# Patient Record
Sex: MALE | URBAN - METROPOLITAN AREA
[De-identification: names, ages, dates, MRNs, and addresses within clinical notes are randomized per-mention and may not be internally consistent; named-entity substitution may affect disease eponyms.]

---

## 2024-10-14 ENCOUNTER — TELEPHONE (OUTPATIENT)
Dept: CARDIOTHORACIC SURGERY | Facility: CLINIC | Age: 79
End: 2024-10-14
Payer: OTHER GOVERNMENT

## 2024-10-14 NOTE — TELEPHONE ENCOUNTER
Contact pt's cardiologist's office to request office notes and diagnostic testing reports. Will schedule consultation appointment upon receipt of records.       ----- Message from Kasey sent at 10/14/2024  2:27 PM CDT -----  Regarding: referral  Good Evening    Kaylen FAUSTIN would like to refer the following patient  in the  Cardiothoracic Surgery department. The patients diagnosis is  Nonrheumatic mitral (valve) insufficiency. I have scanned the patients referral and records into .       Thank you,   Kasey Kerns

## 2024-10-21 ENCOUNTER — TELEPHONE (OUTPATIENT)
Dept: CARDIOTHORACIC SURGERY | Facility: CLINIC | Age: 79
End: 2024-10-21
Payer: OTHER GOVERNMENT

## 2024-10-21 NOTE — TELEPHONE ENCOUNTER
Spoke with pt and confirmed 10/22 appt with Dr. Gates. Pt verbalized understanding of appt time and location. Pt denies any further questions at this time

## 2024-10-22 ENCOUNTER — OFFICE VISIT (OUTPATIENT)
Dept: CARDIOTHORACIC SURGERY | Facility: CLINIC | Age: 79
End: 2024-10-22
Payer: OTHER GOVERNMENT

## 2024-10-22 VITALS
OXYGEN SATURATION: 99 % | WEIGHT: 170.94 LBS | SYSTOLIC BLOOD PRESSURE: 142 MMHG | DIASTOLIC BLOOD PRESSURE: 77 MMHG | BODY MASS INDEX: 26.83 KG/M2 | HEART RATE: 62 BPM | HEIGHT: 67 IN

## 2024-10-22 DIAGNOSIS — E78.5 HYPERLIPIDEMIA, UNSPECIFIED HYPERLIPIDEMIA TYPE: Primary | ICD-10-CM

## 2024-10-22 DIAGNOSIS — I34.1 MITRAL REGURGITATION DUE TO CUSP PROLAPSE: ICD-10-CM

## 2024-10-22 DIAGNOSIS — I10 HYPERTENSION, UNSPECIFIED TYPE: ICD-10-CM

## 2024-10-22 DIAGNOSIS — Z86.711 HISTORY OF PULMONARY EMBOLISM: ICD-10-CM

## 2024-10-22 DIAGNOSIS — Z86.718 HISTORY OF DVT (DEEP VEIN THROMBOSIS): ICD-10-CM

## 2024-10-22 DIAGNOSIS — Z87.891 FORMER SMOKER: ICD-10-CM

## 2024-10-22 DIAGNOSIS — I34.1 MITRAL REGURGITATION DUE TO CUSP PROLAPSE: Primary | ICD-10-CM

## 2024-10-22 DIAGNOSIS — I34.0 MITRAL REGURGITATION DUE TO CUSP PROLAPSE: Primary | ICD-10-CM

## 2024-10-22 DIAGNOSIS — E03.9 HYPOTHYROIDISM, UNSPECIFIED TYPE: ICD-10-CM

## 2024-10-22 DIAGNOSIS — I34.0 MITRAL REGURGITATION DUE TO CUSP PROLAPSE: ICD-10-CM

## 2024-10-22 PROCEDURE — 99214 OFFICE O/P EST MOD 30 MIN: CPT | Mod: PBBFAC | Performed by: THORACIC SURGERY (CARDIOTHORACIC VASCULAR SURGERY)

## 2024-10-22 PROCEDURE — 99205 OFFICE O/P NEW HI 60 MIN: CPT | Mod: S$PBB,,, | Performed by: THORACIC SURGERY (CARDIOTHORACIC VASCULAR SURGERY)

## 2024-10-22 PROCEDURE — 99999 PR PBB SHADOW E&M-EST. PATIENT-LVL IV: CPT | Mod: PBBFAC,,, | Performed by: THORACIC SURGERY (CARDIOTHORACIC VASCULAR SURGERY)

## 2024-10-22 RX ORDER — NITROGLYCERIN 0.4 MG/1
0.4 TABLET SUBLINGUAL
COMMUNITY
Start: 2024-05-13

## 2024-10-22 RX ORDER — CARVEDILOL 6.25 MG/1
6.25 TABLET ORAL
COMMUNITY
Start: 2024-03-06

## 2024-10-22 RX ORDER — AMLODIPINE BESYLATE 2.5 MG/1
2.5 TABLET ORAL
COMMUNITY
Start: 2024-05-30

## 2024-10-22 RX ORDER — LOSARTAN POTASSIUM 50 MG/1
1 TABLET ORAL EVERY MORNING
COMMUNITY
Start: 2024-04-18

## 2024-10-22 RX ORDER — TRAMADOL HYDROCHLORIDE 25 MG/1
50 TABLET, COATED ORAL
COMMUNITY
Start: 2024-04-18

## 2024-10-22 RX ORDER — PANTOPRAZOLE SODIUM 40 MG/1
40 TABLET, DELAYED RELEASE ORAL
COMMUNITY
Start: 2024-04-18

## 2024-10-22 RX ORDER — FAMOTIDINE 40 MG/1
40 TABLET, FILM COATED ORAL
COMMUNITY
Start: 2024-04-18

## 2024-10-22 RX ORDER — CHOLECALCIFEROL (VITAMIN D3) 50 MCG
50 TABLET ORAL
COMMUNITY
Start: 2024-03-05

## 2024-10-22 RX ORDER — UBIDECARENONE 75 MG
500 CAPSULE ORAL
COMMUNITY
Start: 2024-03-05

## 2024-10-22 RX ORDER — MECLIZINE HCL 25MG 25 MG/1
25 TABLET, CHEWABLE ORAL
COMMUNITY
Start: 2024-09-03

## 2024-10-22 RX ORDER — LEVOTHYROXINE SODIUM 75 UG/1
75 TABLET ORAL
COMMUNITY
Start: 2024-03-05

## 2024-10-22 RX ORDER — ASPIRIN 81 MG/1
81 TABLET ORAL
COMMUNITY
Start: 2024-04-18

## 2024-10-22 NOTE — PROGRESS NOTES
Subjective:      Patient ID: Wiliam Arreola is a 79 y.o. male.    Chief Complaint: New Patient      HPI:  Wiliam Arreola is a 79 y.o. male who presents for surgical evaluation of MR. Medical conditions include HTN, HLD, hx DVT lower extremity & PE, hypothyroidism, systolic heart failure, pulmonary HTN. Reports knowing his mitral valve was leaking for probably the past year. Reports that he can probably walk 'a couple hundred yards' before needing a break. Does not use any assistive walking devices. Denies lower extremity swelling, palpitations. Does have some episodes of chest pain both with rest and exertion that resolve on their own. Underwent LHC which was clean. Had a stent placed to the right coronary several years ago. Regarding his blood clots, reports they occurred a long time ago and were unprovoked. He was in the hospital for several weeks to dissolve them.  No prior strokes, seizures, or sternotomies. Denies history of atrial fibrillation, COPD, liver disease.  Quit smoking many years ago. Reports drinking beer nightly. Was adopted so unsure of family history.     Family and social history reviewed    Current Outpatient Medications   Medication Instructions    amLODIPine (NORVASC) 2.5 mg    aspirin (ECOTRIN) 81 mg    carvediloL (COREG) 6.25 mg    cetirizine 10 mg    cholecalciferol (vitamin D3) (VITAMIN D3) 50 mcg    cyanocobalamin 500 mcg    famotidine (PEPCID) 40 mg    ferrous sulfate, dried (SLOW FE) 160 mg, Daily    levothyroxine (SYNTHROID) 75 mcg    losartan (COZAAR) 50 MG tablet 1 tablet, Every morning    meclizine (ANTIVERT) 25 mg    nitroGLYCERIN (NITROSTAT) 0.4 mg    pantoprazole (PROTONIX) 40 mg    traMADoL (ULTRAM) 50 mg         Review of patient's allergies indicates:   Allergen Reactions    Doxycycline Rash     No past medical history on file.  No past surgical history on file.  Family History    None       Social History     Socioeconomic History    Marital status:    Tobacco  Use    Smoking status: Former     Types: Cigarettes    Smokeless tobacco: Never    Tobacco comments:     Quit in 1978     Social Drivers of Health     Financial Resource Strain: Low Risk  (10/19/2024)    Overall Financial Resource Strain (CARDIA)     Difficulty of Paying Living Expenses: Not hard at all   Food Insecurity: No Food Insecurity (10/19/2024)    Hunger Vital Sign     Worried About Running Out of Food in the Last Year: Never true     Ran Out of Food in the Last Year: Never true   Physical Activity: Insufficiently Active (10/19/2024)    Exercise Vital Sign     Days of Exercise per Week: 1 day     Minutes of Exercise per Session: 10 min   Stress: No Stress Concern Present (10/19/2024)    [employer] - Occupational Stress Questionnaire     Feeling of Stress : Only a little   Housing Stability: Unknown (10/19/2024)    Housing Stability Vital Sign     Unable to Pay for Housing in the Last Year: No       Current medications Reviewed    Review of Systems   Constitutional:  Positive for activity change.   HENT:  Negative for nosebleeds.    Eyes:  Negative for visual disturbance.   Respiratory:  Positive for cough and shortness of breath.    Cardiovascular:  Positive for chest pain. Negative for palpitations and leg swelling.   Gastrointestinal:  Negative for nausea.   Musculoskeletal:  Negative for gait problem.   Skin:  Negative for color change.   Neurological:  Positive for dizziness. Negative for seizures.   Hematological:  Does not bruise/bleed easily.   Psychiatric/Behavioral:  Negative for sleep disturbance.      Objective:   Physical Exam  Vitals reviewed.   Constitutional:       General: He is not in acute distress.     Appearance: He is well-developed. He is not diaphoretic.   HENT:      Head: Normocephalic and atraumatic.   Eyes:      Pupils: Pupils are equal, round, and reactive to light.   Neck:      Vascular: No JVD.   Cardiovascular:      Rate and Rhythm: Normal rate.    Pulmonary:      Effort: Pulmonary effort is normal. No respiratory distress.   Musculoskeletal:         General: Normal range of motion.      Cervical back: Normal range of motion.   Skin:     Coloration: Skin is not pale.   Neurological:      General: No focal deficit present.      Mental Status: He is alert.   Psychiatric:         Speech: Speech normal.         Behavior: Behavior normal.         Thought Content: Thought content normal.         Judgment: Judgment normal.         Diagnostic Results:   OSH records reviewed     Assessment:   MR  Plan:     CTS Attending Note:    I have personally taken the history and examined this patient and agree with the DRE's note as stated above.  79-year-old gentleman with symptomatic mitral regurgitation.  He has lifestyle limiting dyspnea on exertion.  I recommended mitral valve repair, and he agreed with this.  We discussed the PRIMARY trial but he was not interested in enrollment. We discussed the risks and benefits of the proposed procedure, including but not limited to death, stroke, respiratory complications, renal complications, arrythmia, need for permanent pacemaker, and infection. We discussed the STS predicted risk.  His questions have been answered, and he wishes to proceed. After a discussion of the advantages and disadvantages of tissue and mechanical prostheses, he indicated that he desires a tissue valve should repair not be feasible.

## 2024-11-12 DIAGNOSIS — I34.1 MITRAL REGURGITATION DUE TO CUSP PROLAPSE: Primary | ICD-10-CM

## 2024-11-12 DIAGNOSIS — Z01.818 PRE-OP TESTING: ICD-10-CM

## 2024-11-12 DIAGNOSIS — I34.0 MITRAL REGURGITATION DUE TO CUSP PROLAPSE: Primary | ICD-10-CM

## 2024-11-22 ENCOUNTER — TELEPHONE (OUTPATIENT)
Dept: CARDIOTHORACIC SURGERY | Facility: CLINIC | Age: 79
End: 2024-11-22
Payer: OTHER GOVERNMENT

## 2024-11-22 NOTE — TELEPHONE ENCOUNTER
Called pt to review pre-op appointments which have been scheduled for December 12. Also reviewed pt's medications. Pt will need to take last dose of amlodipine and losartan on December 9. Pt verbalized understanding of all information.

## 2024-12-09 ENCOUNTER — PATIENT MESSAGE (OUTPATIENT)
Dept: CARDIOTHORACIC SURGERY | Facility: CLINIC | Age: 79
End: 2024-12-09
Payer: OTHER GOVERNMENT

## 2024-12-09 ENCOUNTER — TELEPHONE (OUTPATIENT)
Dept: CARDIOTHORACIC SURGERY | Facility: CLINIC | Age: 79
End: 2024-12-09
Payer: OTHER GOVERNMENT

## 2024-12-09 NOTE — TELEPHONE ENCOUNTER
Attempted call to pt to remind him to take last dose of amlodipine and losartan today in preparation for surgery. No answer, left detailed VM with request for call back should pt have any questions. Will also send portal message.

## 2024-12-11 ENCOUNTER — TELEPHONE (OUTPATIENT)
Dept: CARDIOTHORACIC SURGERY | Facility: CLINIC | Age: 79
End: 2024-12-11
Payer: OTHER GOVERNMENT

## 2024-12-11 NOTE — TELEPHONE ENCOUNTER
Spoke with pt and confirmed 12/21 pre-op appt with Dr. Gates. Pt verbalized understanding of all appt times and locations. Pt denies any questions at this time.

## 2024-12-12 ENCOUNTER — HOSPITAL ENCOUNTER (OUTPATIENT)
Dept: PULMONOLOGY | Facility: CLINIC | Age: 79
Discharge: HOME | End: 2024-12-12
Payer: OTHER GOVERNMENT

## 2024-12-12 ENCOUNTER — HOSPITAL ENCOUNTER (OUTPATIENT)
Dept: CARDIOLOGY | Facility: HOSPITAL | Age: 79
Discharge: HOME | End: 2024-12-12
Attending: THORACIC SURGERY (CARDIOTHORACIC VASCULAR SURGERY)
Payer: OTHER GOVERNMENT

## 2024-12-12 ENCOUNTER — ANESTHESIA EVENT (OUTPATIENT)
Dept: SURGERY | Facility: HOSPITAL | Age: 79
End: 2024-12-12
Payer: OTHER GOVERNMENT

## 2024-12-12 ENCOUNTER — HOSPITAL ENCOUNTER (OUTPATIENT)
Dept: RADIOLOGY | Facility: HOSPITAL | Age: 79
Discharge: HOME | End: 2024-12-12
Attending: THORACIC SURGERY (CARDIOTHORACIC VASCULAR SURGERY)
Payer: OTHER GOVERNMENT

## 2024-12-12 ENCOUNTER — HOSPITAL ENCOUNTER (OUTPATIENT)
Dept: CARDIOLOGY | Facility: CLINIC | Age: 79
Discharge: HOME | End: 2024-12-12
Payer: OTHER GOVERNMENT

## 2024-12-12 ENCOUNTER — OFFICE VISIT (OUTPATIENT)
Dept: CARDIOTHORACIC SURGERY | Facility: CLINIC | Age: 79
End: 2024-12-12
Payer: OTHER GOVERNMENT

## 2024-12-12 VITALS
DIASTOLIC BLOOD PRESSURE: 89 MMHG | HEIGHT: 67 IN | SYSTOLIC BLOOD PRESSURE: 133 MMHG | WEIGHT: 171.06 LBS | HEART RATE: 81 BPM | BODY MASS INDEX: 26.85 KG/M2

## 2024-12-12 VITALS
OXYGEN SATURATION: 98 % | HEIGHT: 67 IN | BODY MASS INDEX: 27.02 KG/M2 | DIASTOLIC BLOOD PRESSURE: 84 MMHG | SYSTOLIC BLOOD PRESSURE: 152 MMHG | HEART RATE: 80 BPM | WEIGHT: 172.19 LBS

## 2024-12-12 DIAGNOSIS — I34.1 MITRAL REGURGITATION DUE TO CUSP PROLAPSE: ICD-10-CM

## 2024-12-12 DIAGNOSIS — Z01.818 PRE-OP TESTING: ICD-10-CM

## 2024-12-12 DIAGNOSIS — I34.0 MITRAL REGURGITATION DUE TO CUSP PROLAPSE: Primary | ICD-10-CM

## 2024-12-12 DIAGNOSIS — I34.0 MITRAL REGURGITATION DUE TO CUSP PROLAPSE: ICD-10-CM

## 2024-12-12 DIAGNOSIS — I34.1 MITRAL REGURGITATION DUE TO CUSP PROLAPSE: Primary | ICD-10-CM

## 2024-12-12 LAB
ASCENDING AORTA: 3.15 CM
AV AREA BY CONTINUOUS VTI: 1.8 CM2
AV INDEX (PROSTH): 0.47
AV LVOT MEAN GRADIENT: 1 MMHG
AV LVOT PEAK GRADIENT: 3 MMHG
AV MEAN GRADIENT: 8 MMHG
AV PEAK GRADIENT: 14.4 MMHG
AV VALVE AREA BY VELOCITY RATIO: 1.6 CM²
AV VALVE AREA: 1.8 CM2
AV VELOCITY RATIO: 0.42
BSA FOR ECHO PROCEDURE: 1.92 M2
CV ECHO LV RWT: 0.39 CM
DOP CALC AO PEAK VEL: 1.9 M/S
DOP CALC AO VTI: 36.5 CM
DOP CALC LVOT AREA: 3.8 CM2
DOP CALC LVOT DIAMETER: 2.2 CM
DOP CALC LVOT PEAK VEL: 0.8 M/S
DOP CALC LVOT STROKE VOLUME: 65.7 CM3
DOP CALCLVOT PEAK VEL VTI: 17.3 CM
E WAVE DECELERATION TIME: 169.23 MS
E/A RATIO: 1.36
E/E' RATIO: 23.17 M/S
ECHO EF ESTIMATED: 61 %
ECHO LV POSTERIOR WALL: 0.9 CM (ref 0.6–1.1)
FRACTIONAL SHORTENING: 32.6 % (ref 28–44)
INTERVENTRICULAR SEPTUM: 0.9 CM (ref 0.6–1.1)
IVC DIAMETER: 0.97 CM
LA MAJOR: 6.02 CM
LA MINOR: 6.05 CM
LA WIDTH: 4.63 CM
LEFT ATRIUM SIZE: 5.27 CM
LEFT ATRIUM VOLUME INDEX MOD: 40.6 ML/M2
LEFT ATRIUM VOLUME INDEX: 66.2 ML/M2
LEFT ATRIUM VOLUME MOD: 76.77 ML
LEFT ATRIUM VOLUME: 125.17 CM3
LEFT INTERNAL DIMENSION IN SYSTOLE: 3.1 CM (ref 2.1–4)
LEFT VENTRICLE DIASTOLIC VOLUME INDEX: 50.28 ML/M2
LEFT VENTRICLE DIASTOLIC VOLUME: 95.03 ML
LEFT VENTRICLE MASS INDEX: 72.9 G/M2
LEFT VENTRICLE SYSTOLIC VOLUME INDEX: 19.8 ML/M2
LEFT VENTRICLE SYSTOLIC VOLUME: 37.5 ML
LEFT VENTRICULAR INTERNAL DIMENSION IN DIASTOLE: 4.6 CM (ref 3.5–6)
LEFT VENTRICULAR MASS: 137.7 G
LV LATERAL E/E' RATIO: 17.38
LV SEPTAL E/E' RATIO: 34.75
MV PEAK A VEL: 1.02 M/S
MV PEAK E VEL: 1.39 M/S
OHS CV RV/LV RATIO: 0.76 CM
OHS QRS DURATION: 140 MS
OHS QTC CALCULATION: 474 MS
PISA MRMAX VEL: 5.37 M/S
PISA TR MAX VEL: 2.6 M/S
RA MAJOR: 3.98 CM
RA PRESSURE ESTIMATED: 3 MMHG
RA WIDTH: 2.42 CM
RIGHT ATRIAL AREA: 8 CM2
RIGHT VENTRICLE DIASTOLIC BASEL DIMENSION: 3.5 CM
RV TB RVSP: 6 MMHG
RV TISSUE DOPPLER FREE WALL SYSTOLIC VELOCITY 1 (APICAL 4 CHAMBER VIEW): 9.73 CM/S
SINUS: 3.31 CM
STJ: 2.83 CM
TDI LATERAL: 0.08 M/S
TDI SEPTAL: 0.04 M/S
TDI: 0.06 M/S
TR MAX PG: 27 MMHG
TRICUSPID ANNULAR PLANE SYSTOLIC EXCURSION: 2.11 CM
TV PEAK GRADIENT: 22 MMHG
TV REST PULMONARY ARTERY PRESSURE: 30 MMHG
Z-SCORE OF LEFT VENTRICULAR DIMENSION IN END DIASTOLE: -1.4
Z-SCORE OF LEFT VENTRICULAR DIMENSION IN END SYSTOLE: -0.4

## 2024-12-12 PROCEDURE — 93010 ELECTROCARDIOGRAM REPORT: CPT | Mod: S$PBB,,, | Performed by: STUDENT IN AN ORGANIZED HEALTH CARE EDUCATION/TRAINING PROGRAM

## 2024-12-12 PROCEDURE — 99499 UNLISTED E&M SERVICE: CPT | Mod: S$PBB,,, | Performed by: THORACIC SURGERY (CARDIOTHORACIC VASCULAR SURGERY)

## 2024-12-12 PROCEDURE — 93306 TTE W/DOPPLER COMPLETE: CPT | Mod: 26,,, | Performed by: INTERNAL MEDICINE

## 2024-12-12 PROCEDURE — 71046 X-RAY EXAM CHEST 2 VIEWS: CPT | Mod: TC

## 2024-12-12 PROCEDURE — 93306 TTE W/DOPPLER COMPLETE: CPT

## 2024-12-12 PROCEDURE — 71046 X-RAY EXAM CHEST 2 VIEWS: CPT | Mod: 26,,, | Performed by: RADIOLOGY

## 2024-12-12 PROCEDURE — 99999 PR PBB SHADOW E&M-EST. PATIENT-LVL V: CPT | Mod: PBBFAC,,, | Performed by: THORACIC SURGERY (CARDIOTHORACIC VASCULAR SURGERY)

## 2024-12-12 PROCEDURE — 93005 ELECTROCARDIOGRAM TRACING: CPT | Mod: PBBFAC | Performed by: STUDENT IN AN ORGANIZED HEALTH CARE EDUCATION/TRAINING PROGRAM

## 2024-12-12 PROCEDURE — 94729 DIFFUSING CAPACITY: CPT | Mod: PBBFAC | Performed by: INTERNAL MEDICINE

## 2024-12-12 PROCEDURE — 99215 OFFICE O/P EST HI 40 MIN: CPT | Mod: PBBFAC,25 | Performed by: THORACIC SURGERY (CARDIOTHORACIC VASCULAR SURGERY)

## 2024-12-12 PROCEDURE — 94010 BREATHING CAPACITY TEST: CPT | Mod: PBBFAC | Performed by: INTERNAL MEDICINE

## 2024-12-12 RX ORDER — MAGNESIUM SULFATE/D5W 2 G/50 ML
4 INTRAVENOUS SOLUTION, PIGGYBACK (ML) INTRAVENOUS
Status: CANCELLED | OUTPATIENT
Start: 2024-12-12

## 2024-12-12 RX ORDER — DOCUSATE SODIUM 100 MG/1
100 CAPSULE, LIQUID FILLED ORAL 2 TIMES DAILY
Status: CANCELLED | OUTPATIENT
Start: 2024-12-12

## 2024-12-12 RX ORDER — METOPROLOL TARTRATE 25 MG/1
25 TABLET, FILM COATED ORAL
Status: CANCELLED | OUTPATIENT
Start: 2024-12-12

## 2024-12-12 RX ORDER — POTASSIUM CHLORIDE 20 MEQ/1
20 TABLET, EXTENDED RELEASE ORAL EVERY 12 HOURS
OUTPATIENT
Start: 2024-12-12

## 2024-12-12 RX ORDER — SODIUM CHLORIDE 9 MG/ML
INJECTION, SOLUTION INTRAVENOUS CONTINUOUS
Status: CANCELLED | OUTPATIENT
Start: 2024-12-12

## 2024-12-12 RX ORDER — IPRATROPIUM BROMIDE AND ALBUTEROL SULFATE 2.5; .5 MG/3ML; MG/3ML
3 SOLUTION RESPIRATORY (INHALATION) EVERY 4 HOURS PRN
Status: CANCELLED | OUTPATIENT
Start: 2024-12-12 | End: 2024-12-13

## 2024-12-12 RX ORDER — METOCLOPRAMIDE HYDROCHLORIDE 5 MG/ML
5 INJECTION INTRAMUSCULAR; INTRAVENOUS EVERY 6 HOURS PRN
Status: CANCELLED | OUTPATIENT
Start: 2024-12-12

## 2024-12-12 RX ORDER — FENTANYL CITRATE 50 UG/ML
25 INJECTION, SOLUTION INTRAMUSCULAR; INTRAVENOUS
Status: CANCELLED | OUTPATIENT
Start: 2024-12-12

## 2024-12-12 RX ORDER — POLYETHYLENE GLYCOL 3350 17 G/17G
17 POWDER, FOR SOLUTION ORAL DAILY
Status: CANCELLED | OUTPATIENT
Start: 2024-12-13

## 2024-12-12 RX ORDER — ASPIRIN 325 MG
325 TABLET, DELAYED RELEASE (ENTERIC COATED) ORAL DAILY
OUTPATIENT
Start: 2024-12-13

## 2024-12-12 RX ORDER — OXYCODONE HYDROCHLORIDE 5 MG/1
5 TABLET ORAL EVERY 4 HOURS PRN
Status: CANCELLED | OUTPATIENT
Start: 2024-12-12

## 2024-12-12 RX ORDER — NAPROXEN SODIUM 220 MG/1
81 TABLET, FILM COATED ORAL DAILY
Status: CANCELLED | OUTPATIENT
Start: 2024-12-13

## 2024-12-12 RX ORDER — NAPROXEN SODIUM 220 MG/1
81 TABLET, FILM COATED ORAL ONCE
Status: CANCELLED | OUTPATIENT
Start: 2024-12-12 | End: 2024-12-12

## 2024-12-12 RX ORDER — FENTANYL CITRATE 50 UG/ML
50 INJECTION, SOLUTION INTRAMUSCULAR; INTRAVENOUS
Status: CANCELLED | OUTPATIENT
Start: 2024-12-15

## 2024-12-12 RX ORDER — ACETAMINOPHEN 325 MG/1
650 TABLET ORAL EVERY 4 HOURS PRN
Status: CANCELLED | OUTPATIENT
Start: 2024-12-12

## 2024-12-12 RX ORDER — POTASSIUM CHLORIDE 29.8 MG/ML
40 INJECTION INTRAVENOUS
Status: CANCELLED | OUTPATIENT
Start: 2024-12-12

## 2024-12-12 RX ORDER — SODIUM CHLORIDE 0.9 % (FLUSH) 0.9 %
10 SYRINGE (ML) INJECTION
Status: CANCELLED | OUTPATIENT
Start: 2024-12-12

## 2024-12-12 RX ORDER — LIDOCAINE HYDROCHLORIDE 10 MG/ML
1 INJECTION, SOLUTION EPIDURAL; INFILTRATION; INTRACAUDAL; PERINEURAL
Status: CANCELLED | OUTPATIENT
Start: 2024-12-12

## 2024-12-12 RX ORDER — OXYCODONE HYDROCHLORIDE 10 MG/1
10 TABLET ORAL EVERY 4 HOURS PRN
Status: CANCELLED | OUTPATIENT
Start: 2024-12-12

## 2024-12-12 RX ORDER — POTASSIUM CHLORIDE 14.9 MG/ML
60 INJECTION INTRAVENOUS
Status: CANCELLED | OUTPATIENT
Start: 2024-12-12

## 2024-12-12 RX ORDER — ATORVASTATIN CALCIUM 40 MG/1
40 TABLET, FILM COATED ORAL
Status: ON HOLD | COMMUNITY
Start: 2024-03-06

## 2024-12-12 RX ORDER — MUPIROCIN 20 MG/G
1 OINTMENT TOPICAL 2 TIMES DAILY
Status: CANCELLED | OUTPATIENT
Start: 2024-12-12 | End: 2024-12-17

## 2024-12-12 RX ORDER — ALBUMIN HUMAN 50 G/1000ML
25 SOLUTION INTRAVENOUS ONCE AS NEEDED
Status: CANCELLED | OUTPATIENT
Start: 2024-12-12 | End: 2036-05-10

## 2024-12-12 RX ORDER — PROPOFOL 10 MG/ML
0-50 INJECTION, EMULSION INTRAVENOUS CONTINUOUS
Status: CANCELLED | OUTPATIENT
Start: 2024-12-12

## 2024-12-12 RX ORDER — FENTANYL CITRATE 50 UG/ML
25 INJECTION, SOLUTION INTRAMUSCULAR; INTRAVENOUS
Status: CANCELLED | OUTPATIENT
Start: 2024-12-13 | End: 2024-12-14

## 2024-12-12 RX ORDER — ATORVASTATIN CALCIUM 40 MG/1
40 TABLET, FILM COATED ORAL NIGHTLY
OUTPATIENT
Start: 2024-12-12

## 2024-12-12 RX ORDER — IPRATROPIUM BROMIDE AND ALBUTEROL SULFATE 2.5; .5 MG/3ML; MG/3ML
3 SOLUTION RESPIRATORY (INHALATION) EVERY 4 HOURS
Status: CANCELLED | OUTPATIENT
Start: 2024-12-12 | End: 2024-12-13

## 2024-12-12 RX ORDER — FAMOTIDINE 20 MG/1
20 TABLET, FILM COATED ORAL 2 TIMES DAILY
OUTPATIENT
Start: 2024-12-12

## 2024-12-12 RX ORDER — MELATONIN 10 MG
CAPSULE ORAL
Status: ON HOLD | COMMUNITY

## 2024-12-12 RX ORDER — POTASSIUM CHLORIDE 14.9 MG/ML
20 INJECTION INTRAVENOUS
Status: CANCELLED | OUTPATIENT
Start: 2024-12-12

## 2024-12-12 RX ORDER — MUPIROCIN 20 MG/G
1 OINTMENT TOPICAL
Status: CANCELLED | OUTPATIENT
Start: 2024-12-12

## 2024-12-12 RX ORDER — BISACODYL 10 MG/1
10 SUPPOSITORY RECTAL DAILY PRN
Status: CANCELLED | OUTPATIENT
Start: 2024-12-12

## 2024-12-12 RX ORDER — ASPIRIN 300 MG/1
300 SUPPOSITORY RECTAL ONCE AS NEEDED
Status: CANCELLED | OUTPATIENT
Start: 2024-12-12 | End: 2036-05-10

## 2024-12-12 RX ORDER — ONDANSETRON HYDROCHLORIDE 2 MG/ML
4 INJECTION, SOLUTION INTRAVENOUS EVERY 12 HOURS PRN
Status: CANCELLED | OUTPATIENT
Start: 2024-12-12

## 2024-12-12 RX ORDER — FAMOTIDINE 10 MG/ML
20 INJECTION INTRAVENOUS 2 TIMES DAILY
Status: CANCELLED | OUTPATIENT
Start: 2024-12-12

## 2024-12-12 RX ORDER — MAGNESIUM SULFATE HEPTAHYDRATE 40 MG/ML
2 INJECTION, SOLUTION INTRAVENOUS
Status: CANCELLED | OUTPATIENT
Start: 2024-12-12

## 2024-12-12 RX ORDER — DEXTROSE MONOHYDRATE, SODIUM CHLORIDE, AND POTASSIUM CHLORIDE 50; 1.49; 4.5 G/1000ML; G/1000ML; G/1000ML
INJECTION, SOLUTION INTRAVENOUS CONTINUOUS
Status: CANCELLED | OUTPATIENT
Start: 2024-12-12

## 2024-12-12 NOTE — H&P (VIEW-ONLY)
Subjective:      Patient ID: Wiliam Arreola is a 79 y.o. male.    Chief Complaint: No chief complaint on file.      HPI:  Wiliam Arreola is a 79 y.o. male who presents to preop for mitral valve repair surgery. Medical conditions include HTN, HLD, hx DVT lower extremity & PE, hypothyroidism, systolic heart failure, pulmonary HTN. Reports knowing his mitral valve was leaking for probably the past year. Reports that he can probably walk 'a couple hundred yards' before needing a break. Does not use any assistive walking devices. Denies lower extremity swelling, palpitations. Does have some episodes of chest pain both with rest and exertion that resolve on their own. Underwent LHC which was clean. Had a stent placed to the right coronary several years ago. Regarding his blood clots, reports they occurred a long time ago and were unprovoked. He was in the hospital for several weeks to dissolve them.  No prior strokes, seizures, or sternotomies. Denies history of atrial fibrillation, COPD, liver disease.  Quit smoking many years ago. Reports drinking beer nightly. Was adopted so unsure of family history.        Current medications Reviewed  Current Outpatient Medications on File Prior to Visit   Medication Sig Dispense Refill    amLODIPine (NORVASC) 2.5 MG tablet Take 2.5 mg by mouth.      aspirin (ECOTRIN) 81 MG EC tablet 81 mg.      carvediloL (COREG) 6.25 MG tablet Take 6.25 mg by mouth.      cetirizine 10 mg Cap 10 mg.      cholecalciferol, vitamin D3, (VITAMIN D3) 50 mcg (2,000 unit) Tab Take 50 mcg by mouth.      cyanocobalamin 500 MCG tablet Take 500 mcg by mouth.      famotidine (PEPCID) 40 MG tablet Take 40 mg by mouth.      ferrous sulfate, dried (SLOW FE) 160 mg (50 mg iron) TbSR Take 160 mg by mouth once daily.      levothyroxine (SYNTHROID) 75 MCG tablet Take 75 mcg by mouth.      losartan (COZAAR) 50 MG tablet Take 1 tablet by mouth every morning.      meclizine (ANTIVERT) 25 MG tablet Take 25 mg by  mouth.      nitroGLYCERIN (NITROSTAT) 0.4 MG SL tablet Place 0.4 mg under the tongue.      pantoprazole (PROTONIX) 40 MG tablet Take 40 mg by mouth.      traMADoL (ULTRAM) 25 mg tablet 50 mg.       No current facility-administered medications on file prior to visit.       Review of Systems   Constitutional:  Positive for activity change.   HENT:  Negative for nosebleeds.    Eyes:  Negative for visual disturbance.   Respiratory:  Positive for cough and shortness of breath.    Cardiovascular:  Positive for chest pain. Negative for palpitations and leg swelling.   Gastrointestinal:  Negative for nausea.   Musculoskeletal:  Negative for gait problem.   Skin:  Negative for color change.   Neurological:  Positive for dizziness. Negative for seizures.   Hematological:  Does not bruise/bleed easily.   Psychiatric/Behavioral:  Negative for sleep disturbance.      Objective:   Physical Exam  Vitals reviewed.   Constitutional:       General: He is not in acute distress.     Appearance: He is well-developed. He is not diaphoretic.   HENT:      Head: Normocephalic and atraumatic.   Eyes:      Pupils: Pupils are equal, round, and reactive to light.   Neck:      Vascular: No JVD.   Cardiovascular:      Rate and Rhythm: Normal rate.   Pulmonary:      Effort: Pulmonary effort is normal. No respiratory distress.   Musculoskeletal:         General: Normal range of motion.      Cervical back: Normal range of motion.   Skin:     Coloration: Skin is not pale.   Neurological:      General: No focal deficit present.      Mental Status: He is alert.   Psychiatric:         Speech: Speech normal.         Behavior: Behavior normal.         Thought Content: Thought content normal.         Judgment: Judgment normal.         Diagnotic Results: reviewed   ECHO 12/12/24    OSH records reviewed      Assessment:   MR  Plan:     CTS Attending Note:    I have personally taken the history and examined this patient and agree with the DRE's note as stated  above.  79-year-old gentleman with severe symptomatic mitral regurgitation.  We plan mitral valve repair.  He desires a tissue valve if repair is not feasible.  His questions have been answered, and he wishes to proceed.

## 2024-12-12 NOTE — ANESTHESIA PREPROCEDURE EVALUATION
[facility]  Anesthesia Pre-Operative Evaluation         Patient Name: Wiliam Arreola  YOB: 1945  MRN: [id number]    SUBJECTIVE:     Pre-operative evaluation for Procedure(s) (LRB):  REPAIR, MITRAL VALVE, OPEN (N/A)  REPLACEMENT, MITRAL VALVE (N/A)     12/12/2024    Wiliam Arreola is a 79 y.o. male w/ a significant PMHx of HTN, CHF, former smoker, pHTN, hx DVT & PE, CAD (s/p stent), now with shortness of breath with exertion and mitral regurgitation.    Patient now presents for the above procedure(s).    TTE 11/12/24    Left Ventricle: The left ventricle is normal in size. Normal wall thickness. There is normal systolic function with a visually estimated ejection fraction of 55 - 60%. Diastolic function cannot be reliably determined in the presence of mitral valve disease.    Right Ventricle: Normal right ventricular cavity size. Wall thickness is normal. Systolic function is normal.    The left atrium is severely dilated.    Aortic Valve: The aortic valve is a trileaflet valve. There is mild aortic valve sclerosis. There is annular calcification present.    Mitral Valve: There is at least moderate to severe regurgitation with a highly eccentric jet directed anteriorly.  Visualization of the full extent of the regurgitant jet was challenging. There is prolapse of the posterior mitral leaflet.    Pulmonary Artery: The estimated pulmonary artery systolic pressure is 30 mmHg.    IVC/SVC: Normal venous pressure at 3 mmHg.         LDA: None documented.    Prev airway: None documented.    Drips: None documented.    Patient Active Problem List   Diagnosis    HLD (hyperlipidemia)    HTN (hypertension)    Mitral regurgitation due to cusp prolapse    Hypothyroidism    History of DVT (deep vein thrombosis)    History of pulmonary embolism    Former smoker       Review of patient's allergies indicates:   Allergen Reactions    Doxycycline Rash    Lotion base no.188 Rash       Current  Outpatient Medications:  No current facility-administered medications for this encounter.    Current Outpatient Medications:     amLODIPine (NORVASC) 2.5 MG tablet, Take 2.5 mg by mouth., Disp: , Rfl:     aspirin (ECOTRIN) 81 MG EC tablet, 81 mg., Disp: , Rfl:     atorvastatin (LIPITOR) 40 MG tablet, Take 40 mg by mouth., Disp: , Rfl:     carvediloL (COREG) 6.25 MG tablet, Take 6.25 mg by mouth., Disp: , Rfl:     cetirizine 10 mg Cap, 10 mg., Disp: , Rfl:     cholecalciferol, vitamin D3, (VITAMIN D3) 50 mcg (2,000 unit) Tab, Take 50 mcg by mouth., Disp: , Rfl:     cyanocobalamin 500 MCG tablet, Take 500 mcg by mouth., Disp: , Rfl:     famotidine (PEPCID) 40 MG tablet, Take 40 mg by mouth., Disp: , Rfl:     ferrous sulfate, dried (SLOW FE) 160 mg (50 mg iron) TbSR, Take 160 mg by mouth once daily., Disp: , Rfl:     levothyroxine (SYNTHROID) 75 MCG tablet, Take 75 mcg by mouth., Disp: , Rfl:     losartan (COZAAR) 50 MG tablet, Take 1 tablet by mouth every morning., Disp: , Rfl:     meclizine (ANTIVERT) 25 MG tablet, Take 25 mg by mouth., Disp: , Rfl:     melatonin 10 mg Cap, Take 1 capsule as needed by oral route at bedtime., Disp: , Rfl:     nitroGLYCERIN (NITROSTAT) 0.4 MG SL tablet, Place 0.4 mg under the tongue., Disp: , Rfl:     pantoprazole (PROTONIX) 40 MG tablet, Take 40 mg by mouth., Disp: , Rfl:     traMADoL (ULTRAM) 25 mg tablet, 50 mg., Disp: , Rfl:     No past surgical history on file.    Social History     Socioeconomic History    Marital status:    Tobacco Use    Smoking status: Former     Types: Cigarettes    Smokeless tobacco: Never    Tobacco comments:     Quit in 1978     Social Drivers of Health     Financial Resource Strain: Low Risk  (10/19/2024)    Overall Financial Resource Strain (CARDIA)     Difficulty of Paying Living Expenses: Not hard at all   Food Insecurity: No Food Insecurity (10/19/2024)    Hunger Vital Sign     Worried About Running Out of Food in the Last Year: Never true      Ran Out of Food in the Last Year: Never true   Physical Activity: Insufficiently Active (10/19/2024)    Exercise Vital Sign     Days of Exercise per Week: 1 day     Minutes of Exercise per Session: 10 min   Stress: No Stress Concern Present (10/19/2024)    [employer] - Occupational Stress Questionnaire     Feeling of Stress : Only a little   Housing Stability: Unknown (10/19/2024)    Housing Stability Vital Sign     Unable to Pay for Housing in the Last Year: No       OBJECTIVE:     Vital Signs Range (Last 24H):  Pulse:  [80-81]   BP: (133-152)/(84-89)   SpO2:  [98 %]       Significant Labs:  Lab Results   Component Value Date    WBC 7.92 12/12/2024    HGB 16.7 12/12/2024    HCT 51.4 12/12/2024     12/12/2024    ALT 13 12/12/2024    AST 17 12/12/2024     12/12/2024    K 4.3 12/12/2024     12/12/2024    CREATININE 1.5 (H) 12/12/2024    BUN 20 12/12/2024    CO2 26 12/12/2024    INR 1.0 12/12/2024    HGBA1C 6.1 (H) 12/12/2024       Diagnostic Studies: No relevant studies.    EKG:   Results for orders placed or performed during the hospital encounter of 12/12/24   EKG 12-lead    Collection Time: 12/12/24 12:39 PM   Result Value Ref Range    QRS Duration 140 ms    [facility] QTC Calculation 474 ms    Narrative    Test Reason : I34.0,I34.1,Z01.818,    Vent. Rate :  78 BPM     Atrial Rate :  78 BPM     P-R Int : 196 ms          QRS Dur : 140 ms      QT Int : 416 ms       P-R-T Axes :  30 -32  47 degrees    QTcB Int : 474 ms    Normal sinus rhythm  Left axis deviation  Left bundle branch block  Abnormal ECG  No previous ECGs available  Confirmed by Leobardo Fontaine ([id number]) on 12/12/2024 1:02:00 PM    Referred By: EDITH BLOCK           Confirmed By: Leobardo Fontaine       2D ECHO:  TTE:  No results found for this or any previous visit.    RAZA:  No results found for this or any previous visit.    ASSESSMENT/PLAN:           Pre-op Assessment    I have reviewed the Patient  Summary Reports.     I have reviewed the Nursing Notes. I have reviewed the NPO Status.   I have reviewed the Medications.     Review of Systems  Anesthesia Hx:  No problems with previous Anesthesia   History of prior surgery of interest to airway management or planning:          Denies Family Hx of Anesthesia complications.    Denies Personal Hx of Anesthesia complications.                    Social:  Non-Smoker       Hematology/Oncology:  Hematology Normal   Oncology Normal                                   Cardiovascular:  Cardiovascular Normal                                              Pulmonary:  Pulmonary Normal                       Renal/:  Renal/ Normal                 Hepatic/GI:  Hepatic/GI Normal                    Musculoskeletal:  Musculoskeletal Normal                Neurological:  Neurology Normal                                      Endocrine:  Endocrine Normal            Psych:  Psychiatric Normal                    Physical Exam  General: Well nourished, Cooperative, Alert and Oriented    Airway:  Mallampati: II   Mouth Opening: Normal  TM Distance: Normal  Neck ROM: Normal ROM    Dental:  Intact    Chest/Lungs:  Normal Respiratory Rate, Clear to auscultation    Heart:  Rate: Normal  Rhythm: Regular Rhythm        Anesthesia Plan  Type of Anesthesia, risks & benefits discussed:    Anesthesia Type: Gen ETT  Intra-op Monitoring Plan: Standard ASA Monitors, Art Line, Central Line, RAZA and PA  Post Op Pain Control Plan: multimodal analgesia and IV/PO Opioids PRN  Induction:  IV  Airway Plan: Direct, Post-Induction  Informed Consent: Informed consent signed with the Patient and all parties understand the risks and agree with anesthesia plan.  All questions answered.   ASA Score: 3  Day of Surgery Review of History & Physical: H&P Update referred to the surgeon/provider.    Ready For Surgery From Anesthesia Perspective.     .

## 2024-12-13 ENCOUNTER — ANESTHESIA (OUTPATIENT)
Dept: SURGERY | Facility: HOSPITAL | Age: 79
End: 2024-12-13
Payer: OTHER GOVERNMENT

## 2024-12-13 ENCOUNTER — HOSPITAL ENCOUNTER (INPATIENT)
Facility: HOSPITAL | Age: 79
LOS: 8 days | Discharge: HOME HEALTH | DRG: 219 | End: 2024-12-21
Attending: THORACIC SURGERY (CARDIOTHORACIC VASCULAR SURGERY) | Admitting: THORACIC SURGERY (CARDIOTHORACIC VASCULAR SURGERY)
Payer: OTHER GOVERNMENT

## 2024-12-13 DIAGNOSIS — Z98.890 S/P MITRAL VALVE REPAIR: Primary | ICD-10-CM

## 2024-12-13 DIAGNOSIS — R73.03 PREDIABETES: ICD-10-CM

## 2024-12-13 DIAGNOSIS — I49.9 ARRHYTHMIA: ICD-10-CM

## 2024-12-13 DIAGNOSIS — I51.1 RUPTURE OF CHORDAE TENDINEAE: ICD-10-CM

## 2024-12-13 DIAGNOSIS — I34.1 MITRAL REGURGITATION DUE TO CUSP PROLAPSE: ICD-10-CM

## 2024-12-13 DIAGNOSIS — Z98.890 S/P MVR (MITRAL VALVE REPAIR): Primary | ICD-10-CM

## 2024-12-13 DIAGNOSIS — I49.9 ABNORMAL HEART RHYTHM: ICD-10-CM

## 2024-12-13 DIAGNOSIS — I34.0 MITRAL REGURGITATION DUE TO CUSP PROLAPSE: ICD-10-CM

## 2024-12-13 DIAGNOSIS — Z98.890 HISTORY OF HEART SURGERY: ICD-10-CM

## 2024-12-13 LAB
ALLENS TEST: ABNORMAL
APTT PPP: 29.3 SEC (ref 21–32)
BASOPHILS # BLD AUTO: 0.06 K/UL (ref 0–0.2)
BASOPHILS NFR BLD: 0.4 % (ref 0–1.9)
DELSYS: ABNORMAL
DIFFERENTIAL METHOD BLD: ABNORMAL
DLCO ADJ PRE: 12.08 ML/(MIN*MMHG) (ref 14.91–28.77)
DLCO SINGLE BREATH LLN: 14.91
DLCO SINGLE BREATH PRE REF: 58.3 %
DLCO SINGLE BREATH REF: 21.84
DLCOC SBVA LLN: 2.19
DLCOC SBVA PRE REF: 106.4 %
DLCOC SBVA REF: 3.46
DLCOC SINGLE BREATH LLN: 14.91
DLCOC SINGLE BREATH PRE REF: 55.3 %
DLCOC SINGLE BREATH REF: 21.84
DLCOCSBVAULN: 4.73
DLCOCSINGLEBREATHULN: 28.77
DLCOCSINGLEBREATHZSCORE: -2.32
DLCOSINGLEBREATHULN: 28.77
DLCOSINGLEBREATHZSCORE: -2.16
DLCOVA LLN: 2.19
DLCOVA PRE REF: 112.2 %
DLCOVA PRE: 3.88 ML/(MIN*MMHG*L) (ref 2.19–4.73)
DLCOVA REF: 3.46
DLCOVAULN: 4.73
DLVAADJ PRE: 3.68 ML/(MIN*MMHG*L) (ref 2.19–4.73)
EOSINOPHIL # BLD AUTO: 0.1 K/UL (ref 0–0.5)
EOSINOPHIL NFR BLD: 0.5 % (ref 0–8)
ERYTHROCYTE [DISTWIDTH] IN BLOOD BY AUTOMATED COUNT: 12.5 % (ref 11.5–14.5)
ERYTHROCYTE [SEDIMENTATION RATE] IN BLOOD BY WESTERGREN METHOD: 20 MM/H
ERYTHROCYTE [SEDIMENTATION RATE] IN BLOOD BY WESTERGREN METHOD: 20 MM/H
FEF 25 75 LLN: 0.8
FEF 25 75 PRE REF: 46 %
FEF 25 75 REF: 2.51
FEV05 LLN: 0.96
FEV05 REF: 2.1
FEV1 FVC LLN: 60
FEV1 FVC PRE REF: 98.4 %
FEV1 FVC REF: 75
FEV1 LLN: 1.73
FEV1 PRE REF: 63.6 %
FEV1 REF: 2.51
FEV1FVCZSCORE: -0.14
FEV1ZSCORE: -1.92
FIO2: 100
FIO2: 80
FVC LLN: 2.42
FVC PRE REF: 64.1 %
FVC REF: 3.36
FVCZSCORE: -2.11
GLUCOSE SERPL-MCNC: 149 MG/DL (ref 70–110)
GLUCOSE SERPL-MCNC: 150 MG/DL (ref 70–110)
GLUCOSE SERPL-MCNC: 151 MG/DL (ref 70–110)
GLUCOSE SERPL-MCNC: 151 MG/DL (ref 70–110)
GLUCOSE SERPL-MCNC: 152 MG/DL (ref 70–110)
GLUCOSE SERPL-MCNC: 156 MG/DL (ref 70–110)
GLUCOSE SERPL-MCNC: 216 MG/DL (ref 70–110)
HCO3 UR-SCNC: 20.9 MMOL/L (ref 24–28)
HCO3 UR-SCNC: 20.9 MMOL/L (ref 24–28)
HCO3 UR-SCNC: 21.2 MMOL/L (ref 24–28)
HCO3 UR-SCNC: 21.3 MMOL/L (ref 24–28)
HCO3 UR-SCNC: 22.7 MMOL/L (ref 24–28)
HCO3 UR-SCNC: 23.2 MMOL/L (ref 24–28)
HCO3 UR-SCNC: 23.6 MMOL/L (ref 24–28)
HCO3 UR-SCNC: 23.9 MMOL/L (ref 24–28)
HCO3 UR-SCNC: 24.2 MMOL/L (ref 24–28)
HCO3 UR-SCNC: 24.3 MMOL/L (ref 24–28)
HCO3 UR-SCNC: 25.6 MMOL/L (ref 24–28)
HCT VFR BLD AUTO: 34.8 % (ref 40–54)
HCT VFR BLD CALC: 23 %PCV (ref 36–54)
HCT VFR BLD CALC: 24 %PCV (ref 36–54)
HCT VFR BLD CALC: 25 %PCV (ref 36–54)
HCT VFR BLD CALC: 28 %PCV (ref 36–54)
HCT VFR BLD CALC: 33 %PCV (ref 36–54)
HCT VFR BLD CALC: 35 %PCV (ref 36–54)
HCT VFR BLD CALC: 38 %PCV (ref 36–54)
HCT VFR BLD CALC: 38 %PCV (ref 36–54)
HCT VFR BLD CALC: 42 %PCV (ref 36–54)
HGB BLD-MCNC: 12.2 G/DL (ref 14–18)
IMM GRANULOCYTES # BLD AUTO: 0.25 K/UL (ref 0–0.04)
IMM GRANULOCYTES NFR BLD AUTO: 1.5 % (ref 0–0.5)
IVC PRE: 2.07 L (ref 2.42–4.31)
IVC SINGLE BREATH LLN: 2.42
IVC SINGLE BREATH PRE REF: 61.7 %
IVC SINGLE BREATH REF: 3.36
IVCSINGLEBREATHULN: 4.31
LDH SERPL L TO P-CCNC: 0.72 MMOL/L (ref 0.36–1.25)
LDH SERPL L TO P-CCNC: 0.8 MMOL/L (ref 0.36–1.25)
LDH SERPL L TO P-CCNC: 1.76 MMOL/L (ref 0.36–1.25)
LDH SERPL L TO P-CCNC: 1.85 MMOL/L (ref 0.36–1.25)
LDH SERPL L TO P-CCNC: 2.02 MMOL/L (ref 0.36–1.25)
LDH SERPL L TO P-CCNC: 2.24 MMOL/L (ref 0.36–1.25)
LYMPHOCYTES # BLD AUTO: 1.2 K/UL (ref 1–4.8)
LYMPHOCYTES NFR BLD: 7.2 % (ref 18–48)
MCH RBC QN AUTO: 33.4 PG (ref 27–31)
MCHC RBC AUTO-ENTMCNC: 35.1 G/DL (ref 32–36)
MCV RBC AUTO: 95 FL (ref 82–98)
MODE: ABNORMAL
MODE: ABNORMAL
MONOCYTES # BLD AUTO: 1.2 K/UL (ref 0.3–1)
MONOCYTES NFR BLD: 7 % (ref 4–15)
MVV LLN: 82
MVV PRE REF: 50.4 %
MVV REF: 96
NEUTROPHILS # BLD AUTO: 14.2 K/UL (ref 1.8–7.7)
NEUTROPHILS NFR BLD: 83.4 % (ref 38–73)
NRBC BLD-RTO: 0 /100 WBC
OHS QRS DURATION: 150 MS
OHS QTC CALCULATION: 513 MS
PCO2 BLDA: 30.8 MMHG (ref 35–45)
PCO2 BLDA: 32 MMHG (ref 35–45)
PCO2 BLDA: 33 MMHG (ref 35–45)
PCO2 BLDA: 34.5 MMHG (ref 35–45)
PCO2 BLDA: 35.3 MMHG (ref 35–45)
PCO2 BLDA: 36.3 MMHG (ref 35–45)
PCO2 BLDA: 38.1 MMHG (ref 35–45)
PCO2 BLDA: 38.3 MMHG (ref 35–45)
PCO2 BLDA: 39.1 MMHG (ref 35–45)
PCO2 BLDA: 39.4 MMHG (ref 35–45)
PCO2 BLDA: 43.3 MMHG (ref 35–45)
PEEP: 5
PEEP: 5
PEF LLN: 4.3
PEF PRE REF: 69.7 %
PEF REF: 6.36
PH SMN: 7.35 [PH] (ref 7.35–7.45)
PH SMN: 7.38 [PH] (ref 7.35–7.45)
PH SMN: 7.38 [PH] (ref 7.35–7.45)
PH SMN: 7.4 [PH] (ref 7.35–7.45)
PH SMN: 7.4 [PH] (ref 7.35–7.45)
PH SMN: 7.41 [PH] (ref 7.35–7.45)
PH SMN: 7.42 [PH] (ref 7.35–7.45)
PH SMN: 7.43 [PH] (ref 7.35–7.45)
PH SMN: 7.43 [PH] (ref 7.35–7.45)
PH SMN: 7.44 [PH] (ref 7.35–7.45)
PH SMN: 7.45 [PH] (ref 7.35–7.45)
PHYSICIAN COMMENT: ABNORMAL
PLATELET # BLD AUTO: 145 K/UL (ref 150–450)
PMV BLD AUTO: 10.3 FL (ref 9.2–12.9)
PO2 BLDA: 139 MMHG (ref 80–100)
PO2 BLDA: 139 MMHG (ref 80–100)
PO2 BLDA: 226 MMHG (ref 80–100)
PO2 BLDA: 253 MMHG (ref 80–100)
PO2 BLDA: 282 MMHG (ref 80–100)
PO2 BLDA: 409 MMHG (ref 80–100)
PO2 BLDA: 418 MMHG (ref 80–100)
PO2 BLDA: 436 MMHG (ref 80–100)
PO2 BLDA: 48 MMHG (ref 40–60)
PO2 BLDA: 55 MMHG (ref 80–100)
PO2 BLDA: 66 MMHG (ref 80–100)
POC BE: -1 MMOL/L
POC BE: -2 MMOL/L
POC BE: -3 MMOL/L
POC BE: -3 MMOL/L
POC BE: -4 MMOL/L
POC BE: -4 MMOL/L
POC BE: 0 MMOL/L
POC BE: 1 MMOL/L
POC IONIZED CALCIUM: 0.83 MMOL/L (ref 1.06–1.42)
POC IONIZED CALCIUM: 0.86 MMOL/L (ref 1.06–1.42)
POC IONIZED CALCIUM: 0.87 MMOL/L (ref 1.06–1.42)
POC IONIZED CALCIUM: 0.87 MMOL/L (ref 1.06–1.42)
POC IONIZED CALCIUM: 0.88 MMOL/L (ref 1.06–1.42)
POC IONIZED CALCIUM: 1.02 MMOL/L (ref 1.06–1.42)
POC IONIZED CALCIUM: 1.26 MMOL/L (ref 1.06–1.42)
POC IONIZED CALCIUM: 1.27 MMOL/L (ref 1.06–1.42)
POC IONIZED CALCIUM: 1.29 MMOL/L (ref 1.06–1.42)
POC IONIZED CALCIUM: 1.3 MMOL/L (ref 1.06–1.42)
POC IONIZED CALCIUM: 1.31 MMOL/L (ref 1.06–1.42)
POC SATURATED O2: 100 % (ref 95–100)
POC SATURATED O2: 84 % (ref 95–100)
POC SATURATED O2: 87 % (ref 95–100)
POC SATURATED O2: 92 % (ref 95–100)
POC SATURATED O2: 99 % (ref 95–100)
POC SATURATED O2: 99 % (ref 95–100)
POC TCO2: 22 MMOL/L (ref 23–27)
POC TCO2: 24 MMOL/L (ref 23–27)
POC TCO2: 24 MMOL/L (ref 23–27)
POC TCO2: 25 MMOL/L (ref 23–27)
POC TCO2: 27 MMOL/L (ref 24–29)
POCT GLUCOSE: 102 MG/DL (ref 70–110)
POCT GLUCOSE: 106 MG/DL (ref 70–110)
POCT GLUCOSE: 114 MG/DL (ref 70–110)
POCT GLUCOSE: 119 MG/DL (ref 70–110)
POCT GLUCOSE: 135 MG/DL (ref 70–110)
POCT GLUCOSE: 156 MG/DL (ref 70–110)
POCT GLUCOSE: 176 MG/DL (ref 70–110)
POCT GLUCOSE: 187 MG/DL (ref 70–110)
POTASSIUM BLD-SCNC: 3.5 MMOL/L (ref 3.5–5.1)
POTASSIUM BLD-SCNC: 3.6 MMOL/L (ref 3.5–5.1)
POTASSIUM BLD-SCNC: 4 MMOL/L (ref 3.5–5.1)
POTASSIUM BLD-SCNC: 4.1 MMOL/L (ref 3.5–5.1)
POTASSIUM BLD-SCNC: 4.9 MMOL/L (ref 3.5–5.1)
POTASSIUM BLD-SCNC: 5.1 MMOL/L (ref 3.5–5.1)
POTASSIUM BLD-SCNC: 5.1 MMOL/L (ref 3.5–5.1)
POTASSIUM BLD-SCNC: 5.3 MMOL/L (ref 3.5–5.1)
POTASSIUM BLD-SCNC: 5.3 MMOL/L (ref 3.5–5.1)
POTASSIUM SERPL-SCNC: 3.7 MMOL/L (ref 3.5–5.1)
PRE DLCO: 12.73 ML/(MIN*MMHG) (ref 14.91–28.77)
PRE FEF 25 75: 1.15 L/S (ref 0.8–4.22)
PRE FET 100: 7.61 SEC
PRE FEV05 REF: 62.1 %
PRE FEV1 FVC: 74.08 % (ref 60.26–88.87)
PRE FEV1: 1.59 L (ref 1.73–3.22)
PRE FEV5: 1.3 L (ref 0.96–3.23)
PRE FVC: 2.15 L (ref 2.42–4.31)
PRE MVV: 48.39 L/MIN (ref 81.55–110.33)
PRE PEF: 4.43 L/S (ref 4.3–8.42)
RBC # BLD AUTO: 3.65 M/UL (ref 4.6–6.2)
SAMPLE: ABNORMAL
SAMPLE: NORMAL
SAMPLE: NORMAL
SITE: ABNORMAL
SODIUM BLD-SCNC: 136 MMOL/L (ref 136–145)
SODIUM BLD-SCNC: 136 MMOL/L (ref 136–145)
SODIUM BLD-SCNC: 137 MMOL/L (ref 136–145)
SODIUM BLD-SCNC: 138 MMOL/L (ref 136–145)
SODIUM BLD-SCNC: 139 MMOL/L (ref 136–145)
SODIUM BLD-SCNC: 139 MMOL/L (ref 136–145)
SODIUM BLD-SCNC: 141 MMOL/L (ref 136–145)
VA PRE: 3.28 L (ref 6.16–6.16)
VA SINGLE BREATH LLN: 6.16
VA SINGLE BREATH PRE REF: 53.2 %
VA SINGLE BREATH REF: 6.16
VASINGLEBREATHULN: 6.16
VT: 430
VT: 430
WBC # BLD AUTO: 16.98 K/UL (ref 3.9–12.7)

## 2024-12-13 PROCEDURE — 82803 BLOOD GASES ANY COMBINATION: CPT

## 2024-12-13 PROCEDURE — 02BG0ZZ EXCISION OF MITRAL VALVE, OPEN APPROACH: ICD-10-PCS | Performed by: THORACIC SURGERY (CARDIOTHORACIC VASCULAR SURGERY)

## 2024-12-13 PROCEDURE — 27100088 HC CELL SAVER

## 2024-12-13 PROCEDURE — 93005 ELECTROCARDIOGRAM TRACING: CPT

## 2024-12-13 PROCEDURE — 27100026 HC SHUNT SENSOR, TERUMO

## 2024-12-13 PROCEDURE — 37000008 HC ANESTHESIA 1ST 15 MINUTES: Performed by: THORACIC SURGERY (CARDIOTHORACIC VASCULAR SURGERY)

## 2024-12-13 PROCEDURE — 20000000 HC ICU ROOM

## 2024-12-13 PROCEDURE — 85014 HEMATOCRIT: CPT

## 2024-12-13 PROCEDURE — 63600175 PHARM REV CODE 636 W HCPCS: Performed by: STUDENT IN AN ORGANIZED HEALTH CARE EDUCATION/TRAINING PROGRAM

## 2024-12-13 PROCEDURE — 25000003 PHARM REV CODE 250: Performed by: STUDENT IN AN ORGANIZED HEALTH CARE EDUCATION/TRAINING PROGRAM

## 2024-12-13 PROCEDURE — 86920 COMPATIBILITY TEST SPIN: CPT | Performed by: THORACIC SURGERY (CARDIOTHORACIC VASCULAR SURGERY)

## 2024-12-13 PROCEDURE — 63600175 PHARM REV CODE 636 W HCPCS: Mod: JZ,JG | Performed by: ANESTHESIOLOGY

## 2024-12-13 PROCEDURE — 37000009 HC ANESTHESIA EA ADD 15 MINS: Performed by: THORACIC SURGERY (CARDIOTHORACIC VASCULAR SURGERY)

## 2024-12-13 PROCEDURE — 99291 CRITICAL CARE FIRST HOUR: CPT | Mod: ,,, | Performed by: STUDENT IN AN ORGANIZED HEALTH CARE EDUCATION/TRAINING PROGRAM

## 2024-12-13 PROCEDURE — 27000191 HC C-V MONITORING

## 2024-12-13 PROCEDURE — 36000712 HC OR TIME LEV V 1ST 15 MIN: Performed by: THORACIC SURGERY (CARDIOTHORACIC VASCULAR SURGERY)

## 2024-12-13 PROCEDURE — 82330 ASSAY OF CALCIUM: CPT

## 2024-12-13 PROCEDURE — 85025 COMPLETE CBC W/AUTO DIFF WBC: CPT | Performed by: STUDENT IN AN ORGANIZED HEALTH CARE EDUCATION/TRAINING PROGRAM

## 2024-12-13 PROCEDURE — 5A1221Z PERFORMANCE OF CARDIAC OUTPUT, CONTINUOUS: ICD-10-PCS | Performed by: THORACIC SURGERY (CARDIOTHORACIC VASCULAR SURGERY)

## 2024-12-13 PROCEDURE — 27201015 HC HEMO-CONCENTRATOR

## 2024-12-13 PROCEDURE — 84132 ASSAY OF SERUM POTASSIUM: CPT

## 2024-12-13 PROCEDURE — 93320 DOPPLER ECHO COMPLETE: CPT | Mod: 26,,, | Performed by: ANESTHESIOLOGY

## 2024-12-13 PROCEDURE — 88305 TISSUE EXAM BY PATHOLOGIST: CPT | Mod: 26,,, | Performed by: STUDENT IN AN ORGANIZED HEALTH CARE EDUCATION/TRAINING PROGRAM

## 2024-12-13 PROCEDURE — 36000713 HC OR TIME LEV V EA ADD 15 MIN: Performed by: THORACIC SURGERY (CARDIOTHORACIC VASCULAR SURGERY)

## 2024-12-13 PROCEDURE — 27200953 HC CARDIOPLEGIA SYSTEM

## 2024-12-13 PROCEDURE — 88305 TISSUE EXAM BY PATHOLOGIST: CPT | Performed by: STUDENT IN AN ORGANIZED HEALTH CARE EDUCATION/TRAINING PROGRAM

## 2024-12-13 PROCEDURE — 85730 THROMBOPLASTIN TIME PARTIAL: CPT | Performed by: STUDENT IN AN ORGANIZED HEALTH CARE EDUCATION/TRAINING PROGRAM

## 2024-12-13 PROCEDURE — 93010 ELECTROCARDIOGRAM REPORT: CPT | Mod: ,,, | Performed by: INTERNAL MEDICINE

## 2024-12-13 PROCEDURE — 82800 BLOOD PH: CPT

## 2024-12-13 PROCEDURE — 63600175 PHARM REV CODE 636 W HCPCS

## 2024-12-13 PROCEDURE — 94761 N-INVAS EAR/PLS OXIMETRY MLT: CPT | Mod: XB

## 2024-12-13 PROCEDURE — 99900035 HC TECH TIME PER 15 MIN (STAT)

## 2024-12-13 PROCEDURE — 36592 COLLECT BLOOD FROM PICC: CPT

## 2024-12-13 PROCEDURE — 25000003 PHARM REV CODE 250

## 2024-12-13 PROCEDURE — 94010 BREATHING CAPACITY TEST: CPT

## 2024-12-13 PROCEDURE — 37799 UNLISTED PX VASCULAR SURGERY: CPT

## 2024-12-13 PROCEDURE — 83605 ASSAY OF LACTIC ACID: CPT

## 2024-12-13 PROCEDURE — 94150 VITAL CAPACITY TEST: CPT

## 2024-12-13 PROCEDURE — 36556 INSERT NON-TUNNEL CV CATH: CPT | Mod: 59,,, | Performed by: ANESTHESIOLOGY

## 2024-12-13 PROCEDURE — 02UG0JZ SUPPLEMENT MITRAL VALVE WITH SYNTHETIC SUBSTITUTE, OPEN APPROACH: ICD-10-PCS | Performed by: THORACIC SURGERY (CARDIOTHORACIC VASCULAR SURGERY)

## 2024-12-13 PROCEDURE — 76937 US GUIDE VASCULAR ACCESS: CPT | Mod: 26,,, | Performed by: ANESTHESIOLOGY

## 2024-12-13 PROCEDURE — 99223 1ST HOSP IP/OBS HIGH 75: CPT | Mod: ,,, | Performed by: NURSE PRACTITIONER

## 2024-12-13 PROCEDURE — C1729 CATH, DRAINAGE: HCPCS | Performed by: THORACIC SURGERY (CARDIOTHORACIC VASCULAR SURGERY)

## 2024-12-13 PROCEDURE — 84295 ASSAY OF SERUM SODIUM: CPT

## 2024-12-13 PROCEDURE — 94799 UNLISTED PULMONARY SVC/PX: CPT

## 2024-12-13 PROCEDURE — D9220A PRA ANESTHESIA: Mod: ,,, | Performed by: ANESTHESIOLOGY

## 2024-12-13 PROCEDURE — C9248 INJ, CLEVIDIPINE BUTYRATE: HCPCS | Performed by: STUDENT IN AN ORGANIZED HEALTH CARE EDUCATION/TRAINING PROGRAM

## 2024-12-13 PROCEDURE — 27100171 HC OXYGEN HIGH FLOW UP TO 24 HOURS

## 2024-12-13 PROCEDURE — 27201423 OPTIME MED/SURG SUP & DEVICES STERILE SUPPLY: Performed by: THORACIC SURGERY (CARDIOTHORACIC VASCULAR SURGERY)

## 2024-12-13 PROCEDURE — 36620 INSERTION CATHETER ARTERY: CPT | Mod: 59,,, | Performed by: ANESTHESIOLOGY

## 2024-12-13 PROCEDURE — 63600175 PHARM REV CODE 636 W HCPCS: Mod: JG

## 2024-12-13 PROCEDURE — 93312 ECHO TRANSESOPHAGEAL: CPT | Mod: 26,59,, | Performed by: ANESTHESIOLOGY

## 2024-12-13 PROCEDURE — 94002 VENT MGMT INPAT INIT DAY: CPT

## 2024-12-13 PROCEDURE — 94640 AIRWAY INHALATION TREATMENT: CPT

## 2024-12-13 PROCEDURE — 99900026 HC AIRWAY MAINTENANCE (STAT)

## 2024-12-13 PROCEDURE — 27800595 HC HEART VALVES

## 2024-12-13 PROCEDURE — 27000175 HC ADULT BYPASS PUMP

## 2024-12-13 PROCEDURE — 99900017 HC EXTUBATION W/PARAMETERS (STAT)

## 2024-12-13 PROCEDURE — 25000242 PHARM REV CODE 250 ALT 637 W/ HCPCS

## 2024-12-13 PROCEDURE — 93325 DOPPLER ECHO COLOR FLOW MAPG: CPT | Mod: 26,,, | Performed by: ANESTHESIOLOGY

## 2024-12-13 PROCEDURE — 27202608 HC CANNULA, MISC

## 2024-12-13 PROCEDURE — 27201037 HC PRESSURE MONITORING SET UP

## 2024-12-13 PROCEDURE — 27000445 HC TEMPORARY PACEMAKER LEADS

## 2024-12-13 PROCEDURE — 85520 HEPARIN ASSAY: CPT

## 2024-12-13 DEVICE — BAND SIMUPLUS FLEX 32MM: Type: IMPLANTABLE DEVICE | Site: HEART | Status: FUNCTIONAL

## 2024-12-13 RX ORDER — DEXTROSE 40 %
24 GEL (GRAM) ORAL
Status: DISCONTINUED | OUTPATIENT
Start: 2024-12-13 | End: 2024-12-13

## 2024-12-13 RX ORDER — DEXTROSE 40 %
16 GEL (GRAM) ORAL
Status: DISCONTINUED | OUTPATIENT
Start: 2024-12-13 | End: 2024-12-21 | Stop reason: HOSPADM

## 2024-12-13 RX ORDER — LEVOTHYROXINE SODIUM 75 UG/1
75 TABLET ORAL
Status: DISCONTINUED | OUTPATIENT
Start: 2024-12-14 | End: 2024-12-21 | Stop reason: HOSPADM

## 2024-12-13 RX ORDER — ALBUMIN HUMAN 50 G/1000ML
25 SOLUTION INTRAVENOUS ONCE AS NEEDED
Status: DISCONTINUED | OUTPATIENT
Start: 2024-12-13 | End: 2024-12-21 | Stop reason: HOSPADM

## 2024-12-13 RX ORDER — ASPIRIN 300 MG/1
300 SUPPOSITORY RECTAL ONCE
Status: DISCONTINUED | OUTPATIENT
Start: 2024-12-13 | End: 2024-12-13

## 2024-12-13 RX ORDER — PROPOFOL 10 MG/ML
0-50 INJECTION, EMULSION INTRAVENOUS CONTINUOUS
Status: DISCONTINUED | OUTPATIENT
Start: 2024-12-13 | End: 2024-12-14

## 2024-12-13 RX ORDER — MUPIROCIN 20 MG/G
1 OINTMENT TOPICAL 2 TIMES DAILY
Status: DISPENSED | OUTPATIENT
Start: 2024-12-13 | End: 2024-12-18

## 2024-12-13 RX ORDER — ACETAMINOPHEN 500 MG
1000 TABLET ORAL ONCE
Status: DISCONTINUED | OUTPATIENT
Start: 2024-12-13 | End: 2024-12-13

## 2024-12-13 RX ORDER — FENTANYL CITRATE 50 UG/ML
50 INJECTION, SOLUTION INTRAMUSCULAR; INTRAVENOUS
Status: DISCONTINUED | OUTPATIENT
Start: 2024-12-15 | End: 2024-12-13

## 2024-12-13 RX ORDER — CEFAZOLIN 2 G/1
2 INJECTION, POWDER, FOR SOLUTION INTRAMUSCULAR; INTRAVENOUS
Status: COMPLETED | OUTPATIENT
Start: 2024-12-13 | End: 2024-12-13

## 2024-12-13 RX ORDER — LIDOCAINE HYDROCHLORIDE 40 MG/ML
SOLUTION TOPICAL
Status: DISCONTINUED | OUTPATIENT
Start: 2024-12-13 | End: 2024-12-13

## 2024-12-13 RX ORDER — POTASSIUM CHLORIDE 14.9 MG/ML
60 INJECTION INTRAVENOUS
Status: DISCONTINUED | OUTPATIENT
Start: 2024-12-13 | End: 2024-12-15

## 2024-12-13 RX ORDER — NAPROXEN SODIUM 220 MG/1
81 TABLET, FILM COATED ORAL ONCE
Status: DISCONTINUED | OUTPATIENT
Start: 2024-12-13 | End: 2024-12-13

## 2024-12-13 RX ORDER — LIDOCAINE HYDROCHLORIDE 10 MG/ML
1 INJECTION, SOLUTION EPIDURAL; INFILTRATION; INTRACAUDAL; PERINEURAL
Status: DISCONTINUED | OUTPATIENT
Start: 2024-12-13 | End: 2024-12-13

## 2024-12-13 RX ORDER — PROTAMINE SULFATE 10 MG/ML
INJECTION, SOLUTION INTRAVENOUS
Status: DISCONTINUED | OUTPATIENT
Start: 2024-12-13 | End: 2024-12-13

## 2024-12-13 RX ORDER — BUPIVACAINE HYDROCHLORIDE 5 MG/ML
INJECTION, SOLUTION EPIDURAL; INTRACAUDAL
Status: COMPLETED | OUTPATIENT
Start: 2024-12-13 | End: 2024-12-13

## 2024-12-13 RX ORDER — NITROGLYCERIN 5 MG/ML
INJECTION, SOLUTION INTRAVENOUS
Status: DISCONTINUED | OUTPATIENT
Start: 2024-12-13 | End: 2024-12-13

## 2024-12-13 RX ORDER — KETAMINE HCL IN 0.9 % NACL 50 MG/5 ML
SYRINGE (ML) INTRAVENOUS
Status: DISCONTINUED | OUTPATIENT
Start: 2024-12-13 | End: 2024-12-13

## 2024-12-13 RX ORDER — TRANEXAMIC ACID 100 MG/ML
INJECTION, SOLUTION INTRAVENOUS
Status: DISCONTINUED | OUTPATIENT
Start: 2024-12-13 | End: 2024-12-13

## 2024-12-13 RX ORDER — PROPOFOL 10 MG/ML
VIAL (ML) INTRAVENOUS
Status: DISCONTINUED | OUTPATIENT
Start: 2024-12-13 | End: 2024-12-13

## 2024-12-13 RX ORDER — METOPROLOL TARTRATE 25 MG/1
25 TABLET, FILM COATED ORAL
Status: COMPLETED | OUTPATIENT
Start: 2024-12-13 | End: 2024-12-13

## 2024-12-13 RX ORDER — POLYETHYLENE GLYCOL 3350 17 G/17G
17 POWDER, FOR SOLUTION ORAL DAILY
Status: DISCONTINUED | OUTPATIENT
Start: 2024-12-13 | End: 2024-12-15

## 2024-12-13 RX ORDER — BISACODYL 10 MG/1
10 SUPPOSITORY RECTAL DAILY PRN
Status: DISCONTINUED | OUTPATIENT
Start: 2024-12-13 | End: 2024-12-21 | Stop reason: HOSPADM

## 2024-12-13 RX ORDER — HYDROCODONE BITARTRATE AND ACETAMINOPHEN 500; 5 MG/1; MG/1
TABLET ORAL
Status: DISCONTINUED | OUTPATIENT
Start: 2024-12-13 | End: 2024-12-13

## 2024-12-13 RX ORDER — OXYCODONE HYDROCHLORIDE 10 MG/1
10 TABLET ORAL EVERY 4 HOURS PRN
Status: DISCONTINUED | OUTPATIENT
Start: 2024-12-13 | End: 2024-12-14

## 2024-12-13 RX ORDER — FENTANYL CITRATE 50 UG/ML
25 INJECTION, SOLUTION INTRAMUSCULAR; INTRAVENOUS
Status: DISCONTINUED | OUTPATIENT
Start: 2024-12-13 | End: 2024-12-13

## 2024-12-13 RX ORDER — ACETAMINOPHEN 325 MG/1
650 TABLET ORAL EVERY 4 HOURS PRN
Status: DISCONTINUED | OUTPATIENT
Start: 2024-12-13 | End: 2024-12-13

## 2024-12-13 RX ORDER — POTASSIUM CHLORIDE 14.9 MG/ML
20 INJECTION INTRAVENOUS
Status: DISCONTINUED | OUTPATIENT
Start: 2024-12-13 | End: 2024-12-15

## 2024-12-13 RX ORDER — DEXTROSE MONOHYDRATE, SODIUM CHLORIDE, AND POTASSIUM CHLORIDE 50; 1.49; 4.5 G/1000ML; G/1000ML; G/1000ML
INJECTION, SOLUTION INTRAVENOUS CONTINUOUS
Status: DISCONTINUED | OUTPATIENT
Start: 2024-12-13 | End: 2024-12-14

## 2024-12-13 RX ORDER — POTASSIUM CHLORIDE 29.8 MG/ML
40 INJECTION INTRAVENOUS
Status: DISCONTINUED | OUTPATIENT
Start: 2024-12-13 | End: 2024-12-15

## 2024-12-13 RX ORDER — FENTANYL CITRATE 50 UG/ML
INJECTION, SOLUTION INTRAMUSCULAR; INTRAVENOUS
Status: DISCONTINUED | OUTPATIENT
Start: 2024-12-13 | End: 2024-12-13

## 2024-12-13 RX ORDER — NAPROXEN SODIUM 220 MG/1
81 TABLET, FILM COATED ORAL DAILY
Status: DISCONTINUED | OUTPATIENT
Start: 2024-12-13 | End: 2024-12-13

## 2024-12-13 RX ORDER — ALBUMIN HUMAN 50 G/1000ML
25 SOLUTION INTRAVENOUS ONCE
Status: DISCONTINUED | OUTPATIENT
Start: 2024-12-13 | End: 2024-12-17

## 2024-12-13 RX ORDER — CEFAZOLIN 2 G/1
2 INJECTION, POWDER, FOR SOLUTION INTRAMUSCULAR; INTRAVENOUS
Status: DISPENSED | OUTPATIENT
Start: 2024-12-13 | End: 2024-12-15

## 2024-12-13 RX ORDER — IPRATROPIUM BROMIDE AND ALBUTEROL SULFATE 2.5; .5 MG/3ML; MG/3ML
3 SOLUTION RESPIRATORY (INHALATION) EVERY 4 HOURS PRN
Status: DISPENSED | OUTPATIENT
Start: 2024-12-13 | End: 2024-12-14

## 2024-12-13 RX ORDER — MAGNESIUM SULFATE HEPTAHYDRATE 40 MG/ML
2 INJECTION, SOLUTION INTRAVENOUS
Status: DISCONTINUED | OUTPATIENT
Start: 2024-12-13 | End: 2024-12-15

## 2024-12-13 RX ORDER — ASPIRIN 325 MG
325 TABLET ORAL DAILY
Status: DISCONTINUED | OUTPATIENT
Start: 2024-12-13 | End: 2024-12-19

## 2024-12-13 RX ORDER — FAMOTIDINE 10 MG/ML
20 INJECTION INTRAVENOUS 2 TIMES DAILY
Status: DISCONTINUED | OUTPATIENT
Start: 2024-12-13 | End: 2024-12-13

## 2024-12-13 RX ORDER — MUPIROCIN 20 MG/G
1 OINTMENT TOPICAL
Status: COMPLETED | OUTPATIENT
Start: 2024-12-13 | End: 2024-12-13

## 2024-12-13 RX ORDER — ROCURONIUM BROMIDE 10 MG/ML
INJECTION, SOLUTION INTRAVENOUS
Status: DISCONTINUED | OUTPATIENT
Start: 2024-12-13 | End: 2024-12-13

## 2024-12-13 RX ORDER — LIDOCAINE HYDROCHLORIDE 20 MG/ML
INJECTION, SOLUTION EPIDURAL; INFILTRATION; INTRACAUDAL; PERINEURAL
Status: DISCONTINUED | OUTPATIENT
Start: 2024-12-13 | End: 2024-12-13

## 2024-12-13 RX ORDER — METOCLOPRAMIDE HYDROCHLORIDE 5 MG/ML
5 INJECTION INTRAMUSCULAR; INTRAVENOUS EVERY 6 HOURS PRN
Status: DISCONTINUED | OUTPATIENT
Start: 2024-12-13 | End: 2024-12-17

## 2024-12-13 RX ORDER — FAMOTIDINE 10 MG/ML
20 INJECTION INTRAVENOUS DAILY
Status: DISCONTINUED | OUTPATIENT
Start: 2024-12-13 | End: 2024-12-17

## 2024-12-13 RX ORDER — HYDROMORPHONE HYDROCHLORIDE 1 MG/ML
0.5 INJECTION, SOLUTION INTRAMUSCULAR; INTRAVENOUS; SUBCUTANEOUS EVERY 6 HOURS PRN
Status: DISCONTINUED | OUTPATIENT
Start: 2024-12-13 | End: 2024-12-14

## 2024-12-13 RX ORDER — MAGNESIUM SULFATE HEPTAHYDRATE 40 MG/ML
4 INJECTION, SOLUTION INTRAVENOUS
Status: DISCONTINUED | OUTPATIENT
Start: 2024-12-13 | End: 2024-12-15

## 2024-12-13 RX ORDER — DEXMEDETOMIDINE HYDROCHLORIDE 4 UG/ML
0-1.4 INJECTION, SOLUTION INTRAVENOUS CONTINUOUS
Status: DISCONTINUED | OUTPATIENT
Start: 2024-12-13 | End: 2024-12-14

## 2024-12-13 RX ORDER — CALCIUM CHLORIDE 100 MG/ML
INJECTION, SOLUTION INTRAVENOUS
Status: DISCONTINUED | OUTPATIENT
Start: 2024-12-13 | End: 2024-12-13

## 2024-12-13 RX ORDER — IPRATROPIUM BROMIDE AND ALBUTEROL SULFATE 2.5; .5 MG/3ML; MG/3ML
3 SOLUTION RESPIRATORY (INHALATION) EVERY 4 HOURS
Status: DISPENSED | OUTPATIENT
Start: 2024-12-13 | End: 2024-12-14

## 2024-12-13 RX ORDER — DEXTROSE 40 %
24 GEL (GRAM) ORAL
Status: DISCONTINUED | OUTPATIENT
Start: 2024-12-13 | End: 2024-12-21 | Stop reason: HOSPADM

## 2024-12-13 RX ORDER — SODIUM CHLORIDE 9 MG/ML
INJECTION, SOLUTION INTRAVENOUS CONTINUOUS
Status: DISCONTINUED | OUTPATIENT
Start: 2024-12-13 | End: 2024-12-13

## 2024-12-13 RX ORDER — SODIUM CHLORIDE 0.9 % (FLUSH) 0.9 %
10 SYRINGE (ML) INJECTION
Status: DISCONTINUED | OUTPATIENT
Start: 2024-12-13 | End: 2024-12-21 | Stop reason: HOSPADM

## 2024-12-13 RX ORDER — ATORVASTATIN CALCIUM 40 MG/1
40 TABLET, FILM COATED ORAL DAILY
Status: DISCONTINUED | OUTPATIENT
Start: 2024-12-13 | End: 2024-12-21 | Stop reason: HOSPADM

## 2024-12-13 RX ORDER — HEPARIN SODIUM 1000 [USP'U]/ML
INJECTION, SOLUTION INTRAVENOUS; SUBCUTANEOUS
Status: DISCONTINUED | OUTPATIENT
Start: 2024-12-13 | End: 2024-12-13

## 2024-12-13 RX ORDER — DOCUSATE SODIUM 100 MG/1
100 CAPSULE, LIQUID FILLED ORAL 2 TIMES DAILY
Status: DISCONTINUED | OUTPATIENT
Start: 2024-12-13 | End: 2024-12-21 | Stop reason: HOSPADM

## 2024-12-13 RX ORDER — OXYCODONE HYDROCHLORIDE 5 MG/1
5 TABLET ORAL EVERY 4 HOURS PRN
Status: DISCONTINUED | OUTPATIENT
Start: 2024-12-13 | End: 2024-12-16

## 2024-12-13 RX ORDER — ONDANSETRON HYDROCHLORIDE 2 MG/ML
4 INJECTION, SOLUTION INTRAVENOUS EVERY 12 HOURS PRN
Status: DISCONTINUED | OUTPATIENT
Start: 2024-12-13 | End: 2024-12-17

## 2024-12-13 RX ORDER — ASPIRIN 300 MG/1
300 SUPPOSITORY RECTAL ONCE AS NEEDED
Status: DISCONTINUED | OUTPATIENT
Start: 2024-12-13 | End: 2024-12-21 | Stop reason: HOSPADM

## 2024-12-13 RX ADMIN — CEFAZOLIN 2 G: 2 INJECTION, POWDER, FOR SOLUTION INTRAMUSCULAR; INTRAVENOUS at 07:12

## 2024-12-13 RX ADMIN — MUPIROCIN 1 G: 20 OINTMENT TOPICAL at 08:12

## 2024-12-13 RX ADMIN — Medication 50 MG: at 07:12

## 2024-12-13 RX ADMIN — METOPROLOL TARTRATE 25 MG: 25 TABLET, FILM COATED ORAL at 06:12

## 2024-12-13 RX ADMIN — MUPIROCIN 1 G: 20 OINTMENT TOPICAL at 06:12

## 2024-12-13 RX ADMIN — DEXMEDETOMIDINE HYDROCHLORIDE 0.2 MCG/KG/HR: 4 INJECTION INTRAVENOUS at 01:12

## 2024-12-13 RX ADMIN — BUPIVACAINE HYDROCHLORIDE 40 ML: 5 INJECTION, SOLUTION EPIDURAL; INTRACAUDAL; PERINEURAL at 07:12

## 2024-12-13 RX ADMIN — SODIUM CHLORIDE 3 UNITS/HR: 9 INJECTION, SOLUTION INTRAVENOUS at 12:12

## 2024-12-13 RX ADMIN — VASOPRESSIN 0.04 UNITS/KG/HR: 20 INJECTION INTRAVENOUS at 08:12

## 2024-12-13 RX ADMIN — FENTANYL CITRATE 100 MCG: 50 INJECTION, SOLUTION INTRAMUSCULAR; INTRAVENOUS at 12:12

## 2024-12-13 RX ADMIN — LIDOCAINE HYDROCHLORIDE 100 MG: 20 INJECTION, SOLUTION EPIDURAL; INFILTRATION; INTRACAUDAL at 07:12

## 2024-12-13 RX ADMIN — CEFAZOLIN 2 G: 330 INJECTION, POWDER, FOR SOLUTION INTRAMUSCULAR; INTRAVENOUS at 08:12

## 2024-12-13 RX ADMIN — OXYCODONE HYDROCHLORIDE 10 MG: 10 TABLET ORAL at 07:12

## 2024-12-13 RX ADMIN — HEPARIN SODIUM 23000 UNITS: 1000 INJECTION, SOLUTION INTRAVENOUS; SUBCUTANEOUS at 08:12

## 2024-12-13 RX ADMIN — LIDOCAINE HYDROCHLORIDE 100 MG: 20 INJECTION, SOLUTION EPIDURAL; INFILTRATION; INTRACAUDAL at 11:12

## 2024-12-13 RX ADMIN — HEPARIN SODIUM 5000 UNITS: 1000 INJECTION, SOLUTION INTRAVENOUS; SUBCUTANEOUS at 09:12

## 2024-12-13 RX ADMIN — IPRATROPIUM BROMIDE AND ALBUTEROL SULFATE 3 ML: 2.5; .5 SOLUTION RESPIRATORY (INHALATION) at 03:12

## 2024-12-13 RX ADMIN — PROPOFOL 60 MCG/KG/MIN: 10 INJECTION, EMULSION INTRAVENOUS at 12:12

## 2024-12-13 RX ADMIN — CLEVIPIDINE 10 MG/HR: 0.5 EMULSION INTRAVENOUS at 07:12

## 2024-12-13 RX ADMIN — ONDANSETRON 1 G: 2 INJECTION INTRAMUSCULAR; INTRAVENOUS at 11:12

## 2024-12-13 RX ADMIN — POTASSIUM CHLORIDE 20 MEQ: 200 INJECTION, SOLUTION INTRAVENOUS at 10:12

## 2024-12-13 RX ADMIN — ROCURONIUM BROMIDE 100 MG: 10 INJECTION, SOLUTION INTRAVENOUS at 07:12

## 2024-12-13 RX ADMIN — FENTANYL CITRATE 100 MCG: 50 INJECTION, SOLUTION INTRAMUSCULAR; INTRAVENOUS at 07:12

## 2024-12-13 RX ADMIN — SODIUM CHLORIDE, SODIUM GLUCONATE, SODIUM ACETATE, POTASSIUM CHLORIDE, MAGNESIUM CHLORIDE, SODIUM PHOSPHATE, DIBASIC, AND POTASSIUM PHOSPHATE: .53; .5; .37; .037; .03; .012; .00082 INJECTION, SOLUTION INTRAVENOUS at 07:12

## 2024-12-13 RX ADMIN — NITROGLYCERIN 25 MCG: 5 INJECTION, SOLUTION INTRAVENOUS at 11:12

## 2024-12-13 RX ADMIN — ROCURONIUM BROMIDE 50 MG: 10 INJECTION, SOLUTION INTRAVENOUS at 08:12

## 2024-12-13 RX ADMIN — PROPOFOL 200 MG: 10 INJECTION, EMULSION INTRAVENOUS at 07:12

## 2024-12-13 RX ADMIN — CLEVIPIDINE 2 MG/HR: 0.5 EMULSION INTRAVENOUS at 12:12

## 2024-12-13 RX ADMIN — EPINEPHRINE 0.1 MCG/KG/MIN: 1 INJECTION INTRAMUSCULAR; INTRAVENOUS; SUBCUTANEOUS at 11:12

## 2024-12-13 RX ADMIN — NOREPINEPHRINE BITARTRATE 0.04 MCG/KG/MIN: 1 INJECTION, SOLUTION, CONCENTRATE INTRAVENOUS at 08:12

## 2024-12-13 RX ADMIN — ASPIRIN 325 MG ORAL TABLET 325 MG: 325 PILL ORAL at 07:12

## 2024-12-13 RX ADMIN — FENTANYL CITRATE 50 MCG: 50 INJECTION, SOLUTION INTRAMUSCULAR; INTRAVENOUS at 08:12

## 2024-12-13 RX ADMIN — HYDROMORPHONE HYDROCHLORIDE 0.5 MG: 1 INJECTION, SOLUTION INTRAMUSCULAR; INTRAVENOUS; SUBCUTANEOUS at 07:12

## 2024-12-13 RX ADMIN — OXYCODONE HYDROCHLORIDE 10 MG: 10 TABLET ORAL at 11:12

## 2024-12-13 RX ADMIN — INSULIN HUMAN 3.5 UNITS/HR: 1 INJECTION, SOLUTION INTRAVENOUS at 03:12

## 2024-12-13 RX ADMIN — LIDOCAINE HYDROCHLORIDE 160 MG: 40 SOLUTION TOPICAL at 07:12

## 2024-12-13 RX ADMIN — FENTANYL CITRATE 50 MCG: 50 INJECTION, SOLUTION INTRAMUSCULAR; INTRAVENOUS at 11:12

## 2024-12-13 RX ADMIN — ONDANSETRON 2 G: 2 INJECTION INTRAMUSCULAR; INTRAVENOUS at 12:12

## 2024-12-13 RX ADMIN — FENTANYL CITRATE 100 MCG: 50 INJECTION, SOLUTION INTRAMUSCULAR; INTRAVENOUS at 08:12

## 2024-12-13 RX ADMIN — IPRATROPIUM BROMIDE AND ALBUTEROL SULFATE 3 ML: 2.5; .5 SOLUTION RESPIRATORY (INHALATION) at 08:12

## 2024-12-13 RX ADMIN — DOCUSATE SODIUM 100 MG: 100 CAPSULE, LIQUID FILLED ORAL at 08:12

## 2024-12-13 RX ADMIN — PROTAMINE SULFATE 230 MG: 10 INJECTION, SOLUTION INTRAVENOUS at 11:12

## 2024-12-13 RX ADMIN — TRANEXAMIC ACID 1000 MG: 100 INJECTION, SOLUTION INTRAVENOUS at 08:12

## 2024-12-13 NOTE — SUBJECTIVE & OBJECTIVE
Follow-up For: Procedure(s) (LRB):  REPAIR, MITRAL VALVE, OPEN (N/A)    Post-Operative Day: Day of Surgery     History reviewed. No pertinent past medical history.    History reviewed. No pertinent surgical history.    Review of patient's allergies indicates:   Allergen Reactions    Doxycycline Rash    Lotion base no.188 Rash       Family History    None       Tobacco Use    Smoking status: Former     Types: Cigarettes    Smokeless tobacco: Never    Tobacco comments:     Quit in 1978   Substance and Sexual Activity    Alcohol use: Not on file    Drug use: Not on file    Sexual activity: Not on file      Review of Systems   Unable to perform ROS: Intubated     Objective:     Vital Signs (Most Recent):  Temp: 98.1 °F (36.7 °C) (12/13/24 0613)  Pulse: 69 (12/13/24 1310)  Resp: 20 (12/13/24 1310)  BP: (!) 194/94 (12/13/24 0613)  SpO2: 100 % (12/13/24 1310) Vital Signs (24h Range):  Temp:  [98.1 °F (36.7 °C)] 98.1 °F (36.7 °C)  Pulse:  [69-81] 69  Resp:  [18-20] 20  SpO2:  [97 %-100 %] 100 %  BP: (133-194)/(84-94) 194/94     Weight: 78.1 kg (172 lb 2.9 oz)  Body mass index is 26.97 kg/m².      Intake/Output Summary (Last 24 hours) at 12/13/2024 1314  Last data filed at 12/13/2024 1156  Gross per 24 hour   Intake 1500 ml   Output 675 ml   Net 825 ml          Physical Exam  Vitals and nursing note reviewed.   Constitutional:       Comments: sedated   HENT:      Head: Normocephalic.      Nose: Nose normal.      Mouth/Throat:      Mouth: Mucous membranes are dry.   Eyes:      Conjunctiva/sclera: Conjunctivae normal.      Pupils: Pupils are equal, round, and reactive to light.   Cardiovascular:      Rate and Rhythm: Normal rate and regular rhythm.      Pulses: Normal pulses.   Pulmonary:      Effort: No respiratory distress.      Comments: Intubated. Bilateral mechanical breath sounds  Abdominal:      Palpations: Abdomen is soft.   Musculoskeletal:      Comments: Left radial arterial line, R IJ central line, mediastinal chest  tubes x2   Skin:     General: Skin is warm and dry.      Comments: Chest incision dressing clean and intact            Vents:  Vent Mode: A/C (12/13/24 1310)  Ventilator Initiated: Yes (12/13/24 1310)  Set Rate: 20 BPM (12/13/24 1310)  Vt Set: 430 mL (12/13/24 1310)  PEEP/CPAP: 5 cmH20 (12/13/24 1310)  Oxygen Concentration (%): 100 (12/13/24 1310)  Peak Airway Pressure: 21 cmH20 (12/13/24 1310)  Plateau Pressure: 0 cmH20 (12/13/24 1310)  Total Ve: 9.03 L/m (12/13/24 1310)  Negative Inspiratory Force (cm H2O): 0 (12/13/24 1310)  F/VT Ratio<105 (RSBI): (!) 44.25 (12/13/24 1310)    Lines/Drains/Airways       Central Venous Catheter Line  Duration             Percutaneous Central Line - Triple Lumen  12/13/24 0954 Internal Jugular Right <1 day              Drain  Duration                  Y Chest Tube 1 and 2 12/13/24 1214 1 Anterior Mediastinal 19 Fr. 2 Mediastinal 19 Fr. <1 day              Airway  Duration                  Airway - Non-Surgical 12/13/24 0726 <1 day              Arterial Line  Duration             Arterial Line 12/13/24 0953 Left Radial <1 day              Line  Duration                  Pacer Wires 12/13/24 1214 <1 day              Peripheral Intravenous Line  Duration                  Peripheral IV - Single Lumen 12/13/24 0615 22 G Distal;Posterior;Right Forearm <1 day         Peripheral IV - Single Lumen 12/13/24 0735 14 G  Left Forearm <1 day                    Significant Labs:    CBC/Anemia Profile:  Recent Labs   Lab 12/12/24  1352 12/13/24  0954 12/13/24  1054 12/13/24  1107 12/13/24  1130   WBC 7.92  --   --   --   --    HGB 16.7  --   --   --   --    HCT 51.4   < > 24* 24* 24*     --   --   --   --    MCV 99*  --   --   --   --    RDW 12.6  --   --   --   --     < > = values in this interval not displayed.        Chemistries:  Recent Labs   Lab 12/12/24  1352      K 4.3      CO2 26   BUN 20   CREATININE 1.5*   CALCIUM 9.5   ALBUMIN 3.9   PROT 7.4   BILITOT 0.5   ALKPHOS 71    ALT 13   AST 17       All pertinent labs within the past 24 hours have been reviewed.    Significant Imaging: I have reviewed all pertinent imaging results/findings within the past 24 hours.

## 2024-12-13 NOTE — PROGRESS NOTES
Pharmacist Renal Dose Adjustment Note    Wiliam Arreola is a 79 y.o. male being treated with the medication famotidine    Patient Data:    Vital Signs (Most Recent):  Temp: 97.6 °F (36.4 °C) (12/13/24 1345)  Pulse: 62 (12/13/24 1400)  Resp: 20 (12/13/24 1400)  BP: (!) 194/94 (12/13/24 0613)  SpO2: 100 % (12/13/24 1400) Vital Signs (72h Range):  Temp:  [97.6 °F (36.4 °C)-98.1 °F (36.7 °C)]   Pulse:  [59-81]   Resp:  [18-20]   BP: (133-194)/(84-94)   SpO2:  [97 %-100 %]   Arterial Line BP: (116-141)/(59-61)      Recent Labs   Lab 12/12/24  1352   CREATININE 1.5*     Serum creatinine: 1.5 mg/dL (H) 12/12/24 1352  Estimated creatinine clearance: 36.7 mL/min (A)    Famotidine 20 mg IV every 12 hrs will be changed to famotidine e 20 mg IV daily. Per Pharmacy Renal Adjustment Protocol.       Pharmacist's Name: Radha Mansfield  Pharmacist's Extension: [phone]

## 2024-12-13 NOTE — CONSULTS
[facility]  Endocrinology  Diabetes Consult Note    Consult Requested by: Babatunde Gates MD   Reason for admit: Rupture of chordae tendineae    HISTORY OF PRESENT ILLNESS:  Reason for Consult: Management of prediabetic, Hyperglycemia     Surgical Procedure and Date: s/p MVR on 12/13/2024    Patient is not diabetic and does not currently take any oral/injectable antidiabetic/hypoglycemic medications.         HPI: Wiliam Arreola is a 79 y.o. male with a PMHx of PE, DVT, HTN, severe MR, HLD, who presents to the SICU s/p mitral valve repair with Dr. Gates. On admission, he is intubated, sedated with propofol, and in stable condition. Endocrine consulted to manage hyperglycemia and pre-diabetes in the context of CTS.     Lab Results   Component Value Date    HGBA1C 6.1 (H) 12/12/2024           Interval HPI:   Overnight events: Patient in room 61457/69170 A. Blood glucose stable. BG at goal on current insulin regimen (IIP). Steroid use- None. Day of Surgery  Renal function- Abnormal - Creatinine 1.5   Vasopressors-  Epinephrine       Endocrine will continue to follow and manage insulin orders inpatient.         Diet NPO Except for: Sips with Medication      Eating:   NPO  Nausea: No  Hypoglycemia and intervention: No  Fever: No  TPN and/or TF: No    PMH, PSH, FH, SH updated and reviewed     ROS:  Review of Systems  ISAURA due to intubation and sedation.     Current Medications and/or Treatments Impacting Glycemic Control  Immunotherapy:    Immunosuppressants       None          Steroids:   Hormones (From admission, onward)      None          Pressors:    Autonomic Drugs (From admission, onward)      Start     Stop Route Frequency Ordered    12/13/24 1245  EPINEPHrine 5 mg in dextrose 5% 250 mL infusion (premix)        Question Answer Comment   Begin at (in mcg/kg/min): 0.02    Titrate by: (in mcg/kg/min) 0.02    Titrate interval: (in minutes) 5    Titrate to maintain: (SBP or MAP or Cardiac  "Index) MAP    Greater than: (in mmHg) 65    Cardiac index greater than: (in L/min) 2.2    Maximum dose: (in mcg/kg/min) 2        -- IV Continuous 12/13/24 1244          Hyperglycemia/Diabetes Medications:   Antihyperglycemics (From admission, onward)      Start     Stop Route Frequency Ordered    12/13/24 1245  insulin regular in 0.9 % NaCl 100 unit/100 mL (1 unit/mL) infusion        Question Answer Comment   Insulin rate changes (DO NOT MODIFY ANSWER) \\[email or web address]\epic\Images\Pharmacy\InsulinInfusions\CTS INSULIN [id number].pdf    Enter initial dose (Units/hr): 1        -- IV Continuous 12/13/24 1244             PHYSICAL EXAMINATION:  Vitals:    12/13/24 1400   BP:    Pulse: 62   Resp: 20   Temp:      Body mass index is 26.97 kg/m².     Physical Exam  Constitutional:       Appearance: He is well-developed.      Interventions: He is sedated and intubated.   HENT:      Head: Normocephalic.   Eyes:      Conjunctiva/sclera: Conjunctivae normal.   Pulmonary:      Effort: Pulmonary effort is normal. He is intubated.   Skin:     General: Skin is warm.      Findings: No rash.            Labs Reviewed and Include   No results for input(s): "GLU", "CALCIUM", "ALBUMIN", "PROT", "NA", "K", "CO2", "CL", "BUN", "CREATININE", "ALKPHOS", "ALT", "AST", "BILITOT" in the last 24 hours.  Lab Results   Component Value Date    WBC 16.98 (H) 12/13/2024    HGB 12.2 (L) 12/13/2024    HCT 34.8 (L) 12/13/2024    MCV 95 12/13/2024     (L) 12/13/2024     No results for input(s): "TSH", "FREET4" in the last 168 hours.  Lab Results   Component Value Date    HGBA1C 6.1 (H) 12/12/2024       Nutritional status:   Body mass index is 26.97 kg/m².  Lab Results   Component Value Date    ALBUMIN 3.9 12/12/2024     No results found for: "PREALBUMIN"    Estimated Creatinine Clearance: 36.7 mL/min (A) (based on SCr of 1.5 mg/dL (H)).    Accu-Checks  No results for input(s): "POCTGLUCOSE" in the last 72 hours.     ASSESSMENT and " PLAN    Cardiac/Vascular  * Rupture of chordae tendineae  S/p MVR  Optimize BG control to improve wound healing        Mitral regurgitation due to cusp prolapse  Managed per primary team  Avoid hypoglycemia        Endocrine  Prediabetes  Endocrinology consulted for BG management.   BG goal 110-140 CTS      - IIP  - Requires intensive BG monitoring.   - BG checks q1hr  - Hypoglycemia protocol in place    - Notify endocrine if patient becomes hypokalemic (K < 3.3) and/or is not responding to replacement.   - Notify endocrine if patient requiring > 20 units/hr.      ** Please notify Endocrine for any change and/or advance in diet**  ** Please call Endocrine for any BG related issues **    Discharge Planning:   TBD. Please notify endocrinology prior to discharge.            Plan discussed with patient, family, and RN at bedside.        Arie Villalobos, DNP, FNP  Endocrinology  [facility]

## 2024-12-13 NOTE — HPI
Wiliam Arreola is a 79 y.o. male with a PMHx of PE, DVT, HTN, severe MR, HLD,  who presents to the SICU s/p mitral valve repair with Dr. Gates. On admission, he is intubated, sedated with propofol, and in stable condition. Inotropic and vasopressor requirements upon admission are 0.04 mcg/kg/min of epinephrine and cleviprex 2mg/hr to maintain blood pressure at a MAP 60-80. Central access includes a right IJ MAC and arterial access includes a left radial arterial line. Patient also has 2 mediastinal chest tubes with serosanguinous output.    Intraoperatively, patient received 1500cc of crystalloid, 2 units of autologous, and 300cc of cell saver. Urine output intraoperatively was recorded as 700cc. Patient's pre-operative echocardiogram was notable for moderately reduced LV function (EF 55%), severe MR, and normal RV function. Post-operative echo significant moderate MR.    Immediate post-operative plans include hemodynamic stabilization and weaning of cardiac and respiratory support. Plan to wean vasopressors and inotropes as tolerated, wean ventilator support with goal of early extubation, and closely monitor fluid status with strict Is/Os and continued fluid resuscitation as needed.

## 2024-12-13 NOTE — SUBJECTIVE & OBJECTIVE
Interval HPI:   Overnight events: Patient in room 18661/47023 A. Blood glucose stable. BG at goal on current insulin regimen (IIP). Steroid use- None. Day of Surgery  Renal function- Abnormal - Creatinine 1.5   Vasopressors-  Epinephrine       Endocrine will continue to follow and manage insulin orders inpatient.         Diet NPO Except for: Sips with Medication      Eating:   NPO  Nausea: No  Hypoglycemia and intervention: No  Fever: No  TPN and/or TF: No    PMH, PSH, FH, SH updated and reviewed     ROS:  Review of Systems  ISAURA due to intubation and sedation.     Current Medications and/or Treatments Impacting Glycemic Control  Immunotherapy:    Immunosuppressants       None          Steroids:   Hormones (From admission, onward)      None          Pressors:    Autonomic Drugs (From admission, onward)      Start     Stop Route Frequency Ordered    12/13/24 1245  EPINEPHrine 5 mg in dextrose 5% 250 mL infusion (premix)        Question Answer Comment   Begin at (in mcg/kg/min): 0.02    Titrate by: (in mcg/kg/min) 0.02    Titrate interval: (in minutes) 5    Titrate to maintain: (SBP or MAP or Cardiac Index) MAP    Greater than: (in mmHg) 65    Cardiac index greater than: (in L/min) 2.2    Maximum dose: (in mcg/kg/min) 2        -- IV Continuous 12/13/24 1244          Hyperglycemia/Diabetes Medications:   Antihyperglycemics (From admission, onward)      Start     Stop Route Frequency Ordered    12/13/24 1245  insulin regular in 0.9 % NaCl 100 unit/100 mL (1 unit/mL) infusion        Question Answer Comment   Insulin rate changes (DO NOT MODIFY ANSWER) \\[email or web address]\epic\Images\Pharmacy\InsulinInfusions\CTS INSULIN [id number].pdf    Enter initial dose (Units/hr): 1        -- IV Continuous 12/13/24 1244             PHYSICAL EXAMINATION:  Vitals:    12/13/24 1400   BP:    Pulse: 62   Resp: 20   Temp:      Body mass index is 26.97 kg/m².     Physical Exam  Constitutional:       Appearance: He is well-developed.       Interventions: He is sedated and intubated.   HENT:      Head: Normocephalic.   Eyes:      Conjunctiva/sclera: Conjunctivae normal.   Pulmonary:      Effort: Pulmonary effort is normal. He is intubated.   Skin:     General: Skin is warm.      Findings: No rash.

## 2024-12-13 NOTE — ANESTHESIA PROCEDURE NOTES
Arterial    Diagnosis: mitral regurgitation    Patient location during procedure: done in OR    Staffing  Authorizing Provider: CHIRAG Guerrero MD  Performing Provider: Kiera Bright MD    Staffing  Performed by: Kiera Bright MD  Authorized by: CHIRAG Guerrero MD    Anesthesiologist was present at the time of the procedure.    Preanesthetic Checklist  Completed: patient identified, IV checked, site marked, risks and benefits discussed, surgical consent, monitors and equipment checked, pre-op evaluation, timeout performed and anesthesia consent givenArterial  Skin Prep: chlorhexidine gluconate  Local Infiltration: none  Orientation: left  Location: radial    Catheter Size: 20 G  Catheter placement by Ultrasound guidance. Heme positive aspiration all ports.   Vessel Caliber: medium, patent, compressibility normal  Needle advanced into vessel with real time Ultrasound guidance.Insertion Attempts: 1  Assessment  Dressing: secured with tape and tegaderm  Patient: Tolerated well

## 2024-12-13 NOTE — ANESTHESIA PROCEDURE NOTES
Pectointercostal BL SS    Patient location during procedure: OR   Block not for primary anesthetic.  Reason for block: at surgeon's request and post-op pain management   Post-op Pain Location: Sternal Pain      Staffing  Authorizing Provider: CHIRAG Guerrero MD  Performing Provider: Kiera Bright MD    Staffing  Performed by: Kiera Bright MD  Authorized by: CHIRAG Guerrero MD    Preanesthetic Checklist  Completed: patient identified, IV checked, site marked, risks and benefits discussed, surgical consent, monitors and equipment checked, pre-op evaluation and timeout performed  Peripheral Block  Patient position: supine  Prep: ChloraPrep  Patient monitoring: heart rate, cardiac monitor, continuous pulse ox, continuous capnometry and frequent blood pressure checks  Block type: Other (petointercostal block)  Laterality: bilateral  Injection technique: single shot  Needle  Needle type: Echogenic   Needle gauge: 22 G  Needle length: 1.5 in  Needle localization: ultrasound guidance     Assessment  Injection assessment: negative aspiration  Heart rate change: no  Slow fractionated injection: yes  Pain Tolerance: comfortable throughout block and no complaints  Medications:    Medications: bupivacaine (pf) (MARCAINE) injection 0.5% - Perineural   40 mL - 12/13/2024 7:48:00 AM

## 2024-12-13 NOTE — ANESTHESIA PROCEDURE NOTES
RAZA    Diagnosis: MR  Patient location during procedure: OR  Exam type: Baseline    Staffing  Performed: anesthesiologist and fellow     Anesthesiologist: CHIRAG Guerrero MD        Anesthesiologist Present  Yes      Setup & Induction  Patient preparation: bite block inserted  Probe Insertion: easy  Exam: completeDoppler Echo: 2D, 3D, color flow mapping, pulse wave Doppler and continuous wave Doppler.  Exam     Left Heart        Modified Simpsons: 50 %    LVAD  Estimated Ejection Fraction: 45-54% mild  Regional Wall Abnormalities: no RWMA            Right Heart  Right Ventricle: normal  Right Ventricle Function: normal    Intra Atrial Septum  PFO: no shunt by color flow doppler  no IAS aneurysm  no lipomatous hypertrophy  no Atrial Septal Defect (Asd)    Right Ventricle  Size: normal    Aortic Valve:  Morphology: trileaflet    Regurgitation: mild (2+) aortic regurgitation      Mitral Valve:   Morphology:normal  Prolapse: P2  Jet Description: severe    Tricuspid Valve:  Morphology: normal  Regurgitation: mild    Pulmonic Valve:  Morphology:normal  Regurgitation(color flow): none          Effusions    SummaryFindings discussed with surgeon.    Other Findings   Baseline  -LV EF 50%, no regional wall motion abnormalities  -Normal RV function  -Mild TR  -Mild PI  -Severe MR with an anterirly directed jet. P2 severe prolapse with ruptured cord.   -Mild central AI. No AS  -No dissection  -No pfo  -No pericardial or pleural effusion    CPB separation- upon first attempt to wean from bypass, air embolus down RCA with ST elevations, RV dysfunction and dilation, followed by Vfib. Resumed CPB, treated with inotropy, and as rate and rhythm normalized, able to separate from CPB without issue      Post: Triangular resection and posterior band annuloplasty  -LV function unchanged  -Mild RV dysfunction, when coming off pump air embolus to RCA, RV dilated and with decreased function although improving.  -Mild TR  -No  PI/PS  -Mitral valve no longer with regurgitation. Posterior band in place. No PIERRE. MG 2  -Aortic valve: AI unchanged, mild central  -No PFO/ASD  -No effusion  -No dissection    Pt stable s/p chest closure  Probe removed atraumatically

## 2024-12-13 NOTE — HPI
Reason for Consult: Management of prediabetic, Hyperglycemia     Surgical Procedure and Date: s/p MVR on 12/13/2024    Patient is not diabetic and does not currently take any oral/injectable antidiabetic/hypoglycemic medications.         HPI: Wiliam Arreola is a 79 y.o. male with a PMHx of PE, DVT, HTN, severe MR, HLD, who presents to the SICU s/p mitral valve repair with Dr. Gates. On admission, he is intubated, sedated with propofol, and in stable condition. Endocrine consulted to manage hyperglycemia and pre-diabetes in the context of CTS.     Lab Results   Component Value Date    HGBA1C 6.1 (H) 12/12/2024

## 2024-12-13 NOTE — ASSESSMENT & PLAN NOTE
Neuro/Psych:     - Sedation: Propofol. Wean to precedex when ready for SBT    - Pain:     - Scheduled Tylenol 1000mg Q8H   - PRN Oxycodone 5mg/10mg, Fentanyl             Cardiac:     - S/p mitral valve repair with Dr. Gates    - BP Goal: MAP 60-80    - Inotropes/Pressors: Epinephrine 0.04. Wean as tolerated    - Anti-HTNs: Clevidipine 2 mg/hr. Wean as tolerated    - Rhythm: NSR    - Beta Blocker: will start when appropriate    - Statin: high intensity statin, atorvastatin 40mg      Pulmonary:     - Intubated: FiO2 80%, PEEP 5    - Goal SpO2 >92%    - Will wean ventilator support as tolerated to extubate    - Chest Tubes x2 (Meds)    - ABGs Q1H x 6H, Q3H x 3    - ABGS PRN      Renal:    - Trend BUN/Cr     - Maintain Bautista, record strict Is/Os    Recent Labs   Lab 12/12/24  1352   BUN 20   CREATININE 1.5*         FEN / GI:     - Daily CMP, PRN K/Mag/Phos per protocol     - Replace electrolytes as needed    - Nutrition: NPO pending extubation    - Bowel Regimen: Miralax, docusate      ID:     - Aebrile    - Abx: Complete perioperative cefazolin 2g Q8H x 5 doses    Recent Labs   Lab 12/12/24  1352   WBC 7.92         Heme/Onc:     - Hgb 16.7 pre-operatively    - CBC daily    - ASA 325mg daily, will start pending chest tube output    Recent Labs   Lab 12/12/24  1352   HGB 16.7      APTT 27.8   INR 1.0         Endocrine:     - CTS Goal -140    - HgbA1c: 6.1    - Endocrinology consulted for insulin management      PPx:   Feeding: NPO, will advance as tolerated following extubation ...  Analgesia/Sedation: Propofol, prn oxy/fentanyl  Thromboembolic Prevention: None  HOB >30: Yes  Stress Ulcer: Yes - famotidine 20mg IV BID  Glucose Control: Yes, insulin management per Endocrinology     Lines/Drains/Airway:   ETT    Art Line:L radial   Central Line: R IJ   Bautista    Chest Tubes: x2 (Mediastinal)    Pacing Wires: Temporary V pacing wires      Dispo/Code Status/Palliative:     - Continue SICU Care    - Full  Code

## 2024-12-13 NOTE — OP NOTE
DATE OF PROCEDURE:  12/13/2024     ATTENDING SURGEON:  Babatunde Gates M.D.    ASSISTANT:  Vamsi Levy DO (Cardiothoracic Resident)     PREOPERATIVE DIAGNOSES:  1.  Severe mitral regurgitation.  2.  Hypertension  3.  Hyperlipidemia  4.  History of DVT  5.  Hypothyroidism  6.  Chronic diastolic heart failure  7.  Pulmonary hypertension  8.  Coronary artery disease  9.  Previous percutaneous coronary intervention    POSTOPERATIVE DIAGNOSES:  1.  Severe mitral regurgitation.  2.  Hypertension  3.  Hyperlipidemia  4.  History of DVT  5.  Hypothyroidism  6.  Chronic diastolic heart failure  7.  Pulmonary hypertension  8.  Coronary artery disease  9.  Previous percutaneous coronary intervention     OPERATION PERFORMED:  Mitral valve repair with triangular resection of the P2   scallop of the posterior leaflet of the mitral valve and 32 mm Medtronic Simuplus band annuloplasty.     ANESTHESIA:  General endotracheal.     ESTIMATED BLOOD LOSS:  100 mL.     BRIEF HISTORY:  Mr. Arreola is an 79-year-old gentleman who has known of his mitral regurgitation for roughly 1 year.  He is symptomatic, with dyspnea on exertion.  He has severe mitral regurgitation.  He now presents for mitral valve repair.     PROCEDURE IN DETAIL:  After obtaining informed and written consent, the patient   was brought to the Operating Room and placed on the operating table in supine   position.  After induction of adequate general endotracheal anesthesia, the   patient's chest, abdomen, pelvis and bilateral lower extremities were prepped   and draped in the usual sterile fashion.  An upper midline skin incision was   made, and a median sternotomy was performed.  A pericardial well was created.    The patient was systemically heparinized.  Cannulation sutures were placed in   the aorta and in the superior vena cava.  The aortic cannula was inserted,   followed by the venous.  An IVC cannula was also placed.  Antegrade and   retrograde  cardioplegia catheters were placed.  The patient was then put on   cardiopulmonary bypass.  Once on bypass, circumferential control was obtained   over the superior vena cava.  The aortic crossclamp was applied, and the heart   was arrested using cold blood enhanced antegrade cardioplegia.  A prompt   electromechanical arrest was achieved.  Once the cardioplegia was all in, the   cannulas were repositioned.  A left atriotomy was made near the right-sided   pulmonary veins.  The Lau retractor was placed for exposure.  The mitral   valve was evaluated.  The P2 scallop of the posterior leaflet demonstrated   myxomatous change, and was quite redundant.  There were multiple broken chords.  Multiple nonpledgeted 2-0 Ethibond sutures were placed along   the posterior annulus from the medial trigone to the lateral trigone.  Silk   sutures were placed around the nearest normal cords on either side of the flail   segment.  The triangular resection was then performed.  The leaflet was then   reconstructed using a running 5-0 Ethibond in two layers.  The valve was then   tested, and no significant mitral regurgitation was seen.  The annulus was   sized, and a 32 mm band was selected.  The band was brought up, the annuloplasty   sutures were passed through it, and it was slid into position.  It seated   nicely.  The sutures were tied and then cut.  The valve was again tested, and   again no mitral regurgitation was seen.  The left atriotomy was then closed in a   single layer using running 4-0 Prolene suture.  Prior to closing the left   atrium, de-airing maneuvers were performed.  Once the left atrium was closed,   the hot shot was given retrograde.  Additional de-airing maneuvers were   performed, and the aortic cross-clamp was removed.  The patient was subsequently   weaned from cardiopulmonary bypass.  The patient did separate easily from   bypass.  Once off bypass, all surgical sites were inspected.  There was good    hemostasis.  The valve was evaluated using RAZA.  There was no residual mitral   regurgitation seen. The test   dose of protamine was administered, and this was well tolerated.  The total dose   was then given.  [place] through the total dose, all cannulas were removed.    All surgical sites were again inspected, again there was good hemostasis.    Ventricular pacing wires were placed.  Drains were placed.  After again   confirming adequate hemostasis, the patient's chest was closed using eight #6   stainless steel wires to reapproximate the sternum.  The overlying soft tissues   were reapproximated using absorbable suture material.  The patient's chest was   washed and dried, and a dry dressing was applied.  The patient tolerated the   procedure well, there were no complications.  At the conclusion of the case,   sponge and instrument counts were correct.

## 2024-12-13 NOTE — ANESTHESIA PROCEDURE NOTES
Intubation    Date/Time: 12/13/2024 7:26 AM    Performed by: Arley Marquez DO  Authorized by: CHIRAG Guerrero MD    Intubation:     Induction:  Intravenous    Intubated:  Postinduction    Mask Ventilation:  Easy mask    Attempts:  1    Attempted By:  Resident anesthesiologist    Method of Intubation:  Video laryngoscopy    Blade:  Iraheta 3    Laryngeal View Grade: Grade I - full view of cords      Difficult Airway Encountered?: No      Complications:  None    Airway Device:  Oral endotracheal tube    Airway Device Size:  7.5    Style/Cuff Inflation:  Cuffed    Inflation Amount (mL):  8    Tube secured:  25    Secured at:  The lips    Placement Verified By:  Capnometry    Complicating Factors:  None    Findings Post-Intubation:  BS equal bilateral and atraumatic/condition of teeth unchanged

## 2024-12-13 NOTE — PROGRESS NOTES
Autotransfusion/Rapid Infusion Record:      12/13/2024  Autotransfusionist:  Arie Regalado    Surgeons and Role:     * Babatunde Gates MD - Primary     * Vamsi Levy DO - Fellow  Anesthesiologist:  CHIRAG Guerrero MD    History reviewed. No pertinent past medical history.    Procedure(s) (LRB):  REPAIR, MITRAL VALVE, OPEN (N/A)     1:04 PM    Equipment:    Cell Saver     R.I.S.  : Quietly Model: CATSmart or CATSplus : [place]   Model: [id number]     Serial number: [id number]   Serial number: N/A   Disposable lot #:    Disposable lot #: N/A     Were extra cardiotomies used for cell saver?  no   if yes, #:  n/a    Solutions:  Anticoagulant: ACD-A   Expiration date: n/a Volume used: n/a   Wash solution: 0.9% NaCl   Expiration date: 08/26 Volume used: 2940     Cell saver checklist  Time completed:           [x]   Circuit assembled correctly     [x]   Cell saver powered and operational     [x]   Vacuum connected, functional, adjust to max -150mmHg     [x]   Anticoagulant drip rate adjusted     [x]   Transfer bag properly labeled with patient name, expiration time, volume,       anticoagulant, OR number, and initials     [x]   Cell saver disinfected after use (completed at end of case)       Cell Saver volumes:    Total volume processed:     4237 mL     Total volume pRBCs recovered     608 mL     Volume pRBCs infused     608 mL         RIS checklist   Time completed:  []   RIS circuit assembled correctly     []   RIS power and operational     []   RIS disinfected after use (completed at end of case)       RIS volumes:    Total volume infused:    (see anesthesia record for blood       product information)   N/a mL       Additional comments:    HEP-SALINE ANTICOAGULANT: EXP 12/14/24, 700 mL used

## 2024-12-13 NOTE — BRIEF OP NOTE
[facility] - Surgery ([facility])  Cardiothoracic Surgery  Operative Note    SUMMARY     Date of Procedure: 12/13/2024     Procedure: Procedure(s) (LRB):  REPAIR, MITRAL VALVE, OPEN with triangular resection of P2 and 32 mm Simuplus band annuloplasty  17725    Surgeons and Role:     * Babatunde Gates MD - Primary     * Vamsi Levy DO - Fellow    Assisting Surgeon: None    Pre-Operative Diagnosis: Mitral regurgitation due to cusp prolapse [I34.0, I34.1]    Post-Operative Diagnosis: Post-Op Diagnosis Codes:     * Mitral regurgitation due to cusp prolapse [I34.0, I34.1]    Anesthesia: General    Operative Findings (including complications, if any): Flail P2 with multiple broken chords.    Estimated Blood Loss (EBL): 100 mL           Implants:   Implant Name Type Inv. Item Serial No.  Lot No. LRB No. Used Action   BAND SIMUPLUS FLEX 32MM - [id number]  BAND SIMUPLUS FLEX 32MM [id number] [employer] [facility] [id number] N/A 1 Implanted       Specimens:   Specimen (24h ago, onward)      None           Attestation:  I was present and scrubbed for the procedure.

## 2024-12-13 NOTE — H&P
[facility] - Surgical Intensive Care  Critical Care - Surgery  History & Physical    Patient Name: Wiliam Arreola  MRN: [id number]  Admission Date: 12/13/2024  Code Status: Prior  Attending Physician: Babatunde Gates MD   Primary Care Provider: Silvio Aranda, NP-C   Principal Problem: Rupture of chordae tendineae    Subjective:     HPI:  Wiliam Arreola is a 79 y.o. male with a PMHx of PE, DVT, HTN, severe MR, HLD,  who presents to the SICU s/p mitral valve repair with Dr. Gates. On admission, he is intubated, sedated with propofol, and in stable condition. Inotropic and vasopressor requirements upon admission are 0.04 mcg/kg/min of epinephrine and cleviprex 2mg/hr to maintain blood pressure at a MAP 60-80. Central access includes a right IJ MAC and arterial access includes a left radial arterial line. Patient also has 2 mediastinal chest tubes with serosanguinous output.    Intraoperatively, patient received 1500cc of crystalloid, 2 units of autologous, and 300cc of cell saver. Urine output intraoperatively was recorded as 700cc. He also received bilateral TTP blocks for pain. Patient's pre-operative echocardiogram was notable for moderately reduced LV function (EF 55%), severe MR, and normal RV function. Post-operative echo significant moderate MR.    Immediate post-operative plans include hemodynamic stabilization and weaning of cardiac and respiratory support. Plan to wean vasopressors and inotropes as tolerated, wean ventilator support with goal of early extubation, and closely monitor fluid status with strict Is/Os and continued fluid resuscitation as needed.      Hospital/ICU Course:  No notes on file    Follow-up For: Procedure(s) (LRB):  REPAIR, MITRAL VALVE, OPEN (N/A)    Post-Operative Day: Day of Surgery     History reviewed. No pertinent past medical history.    History reviewed. No pertinent surgical history.    Review of patient's allergies indicates:   Allergen Reactions    Doxycycline  Rash    Lotion base no.188 Rash       Family History    None       Tobacco Use    Smoking status: Former     Types: Cigarettes    Smokeless tobacco: Never    Tobacco comments:     Quit in 1978   Substance and Sexual Activity    Alcohol use: Not on file    Drug use: Not on file    Sexual activity: Not on file      Review of Systems   Unable to perform ROS: Intubated     Objective:     Vital Signs (Most Recent):  Temp: 98.1 °F (36.7 °C) (12/13/24 0613)  Pulse: 69 (12/13/24 1310)  Resp: 20 (12/13/24 1310)  BP: (!) 194/94 (12/13/24 0613)  SpO2: 100 % (12/13/24 1310) Vital Signs (24h Range):  Temp:  [98.1 °F (36.7 °C)] 98.1 °F (36.7 °C)  Pulse:  [69-81] 69  Resp:  [18-20] 20  SpO2:  [97 %-100 %] 100 %  BP: (133-194)/(84-94) 194/94     Weight: 78.1 kg (172 lb 2.9 oz)  Body mass index is 26.97 kg/m².      Intake/Output Summary (Last 24 hours) at 12/13/2024 1314  Last data filed at 12/13/2024 1156  Gross per 24 hour   Intake 1500 ml   Output 675 ml   Net 825 ml          Physical Exam  Vitals and nursing note reviewed.   Constitutional:       Comments: sedated   HENT:      Head: Normocephalic.      Nose: Nose normal.      Mouth/Throat:      Mouth: Mucous membranes are dry.   Eyes:      Conjunctiva/sclera: Conjunctivae normal.      Pupils: Pupils are equal, round, and reactive to light.   Cardiovascular:      Rate and Rhythm: Normal rate and regular rhythm.      Pulses: Normal pulses.   Pulmonary:      Effort: No respiratory distress.      Comments: Intubated. Bilateral mechanical breath sounds  Abdominal:      Palpations: Abdomen is soft.   Musculoskeletal:      Comments: Left radial arterial line, R IJ central line, mediastinal chest tubes x2   Skin:     General: Skin is warm and dry.      Comments: Chest incision dressing clean and intact            Vents:  Vent Mode: A/C (12/13/24 1310)  Ventilator Initiated: Yes (12/13/24 1310)  Set Rate: 20 BPM (12/13/24 1310)  Vt Set: 430 mL (12/13/24 1310)  PEEP/CPAP: 5 cmH20  (12/13/24 1310)  Oxygen Concentration (%): 100 (12/13/24 1310)  Peak Airway Pressure: 21 cmH20 (12/13/24 1310)  Plateau Pressure: 0 cmH20 (12/13/24 1310)  Total Ve: 9.03 L/m (12/13/24 1310)  Negative Inspiratory Force (cm H2O): 0 (12/13/24 1310)  F/VT Ratio<105 (RSBI): (!) 44.25 (12/13/24 1310)    Lines/Drains/Airways       Central Venous Catheter Line  Duration             Percutaneous Central Line - Triple Lumen  12/13/24 0954 Internal Jugular Right <1 day              Drain  Duration                  Y Chest Tube 1 and 2 12/13/24 1214 1 Anterior Mediastinal 19 Fr. 2 Mediastinal 19 Fr. <1 day              Airway  Duration                  Airway - Non-Surgical 12/13/24 0726 <1 day              Arterial Line  Duration             Arterial Line 12/13/24 0953 Left Radial <1 day              Line  Duration                  Pacer Wires 12/13/24 1214 <1 day              Peripheral Intravenous Line  Duration                  Peripheral IV - Single Lumen 12/13/24 0615 22 G Distal;Posterior;Right Forearm <1 day         Peripheral IV - Single Lumen 12/13/24 0735 14 G  Left Forearm <1 day                    Significant Labs:    CBC/Anemia Profile:  Recent Labs   Lab 12/12/24  1352 12/13/24  0954 12/13/24  1054 12/13/24  1107 12/13/24  1130   WBC 7.92  --   --   --   --    HGB 16.7  --   --   --   --    HCT 51.4   < > 24* 24* 24*     --   --   --   --    MCV 99*  --   --   --   --    RDW 12.6  --   --   --   --     < > = values in this interval not displayed.        Chemistries:  Recent Labs   Lab 12/12/24  1352      K 4.3      CO2 26   BUN 20   CREATININE 1.5*   CALCIUM 9.5   ALBUMIN 3.9   PROT 7.4   BILITOT 0.5   ALKPHOS 71   ALT 13   AST 17       All pertinent labs within the past 24 hours have been reviewed.    Significant Imaging: I have reviewed all pertinent imaging results/findings within the past 24 hours.  Assessment/Plan:     Cardiac/Vascular  Mitral regurgitation due to cusp prolapse     Neuro/Psych:     - Sedation: Propofol. Wean to precedex when ready for SBT    - Pain:     - Scheduled Tylenol 1000mg Q8H   - PRN Oxycodone 5mg/10mg, Fentanyl             Cardiac:     - S/p mitral valve repair with Dr. Gates    - BP Goal: MAP 60-80    - Inotropes/Pressors: Epinephrine 0.04. Wean as tolerated    - Anti-HTNs: Clevidipine 2 mg/hr. Wean as tolerated    - Rhythm: NSR    - Beta Blocker: will start when appropriate    - Statin: high intensity statin, atorvastatin 40mg      Pulmonary:     - Intubated: FiO2 80%, PEEP 5    - Goal SpO2 >92%    - Will wean ventilator support as tolerated to extubate    - Chest Tubes x2 (Meds)    - ABGs Q1H x 6H, Q3H x 3    - ABGS PRN      Renal:    - Trend BUN/Cr     - Maintain Bautista, record strict Is/Os    Recent Labs   Lab 12/12/24  1352   BUN 20   CREATININE 1.5*         FEN / GI:     - Daily CMP, PRN K/Mag/Phos per protocol     - Replace electrolytes as needed    - Nutrition: NPO pending extubation    - Bowel Regimen: Miralax, docusate      ID:     - Aebrile    - Abx: Complete perioperative cefazolin 2g Q8H x 5 doses    Recent Labs   Lab 12/12/24  1352   WBC 7.92         Heme/Onc:     - Hgb 16.7 pre-operatively    - CBC daily    - ASA 325mg daily, will start pending chest tube output    Recent Labs   Lab 12/12/24  1352   HGB 16.7      APTT 27.8   INR 1.0         Endocrine:     - CTS Goal -140    - HgbA1c: 6.1    - Endocrinology consulted for insulin management      PPx:   Feeding: NPO, will advance as tolerated following extubation ...  Analgesia/Sedation: Propofol, prn oxy/fentanyl  Thromboembolic Prevention: None  HOB >30: Yes  Stress Ulcer: Yes - famotidine 20mg IV BID  Glucose Control: Yes, insulin management per Endocrinology     Lines/Drains/Airway:   ETT    Art Line:L radial   Central Line: R IJ   Bautista    Chest Tubes: x2 (Mediastinal)    Pacing Wires: Temporary V pacing wires      Dispo/Code Status/Palliative:     - Continue SICU Care    - Full  Code         Critical care was time spent personally by me on the following activities: development of treatment plan with patient or surrogate and bedside caregivers, discussions with consultants, evaluation of patient's response to treatment, examination of patient, ordering and performing treatments and interventions, ordering and review of laboratory studies, ordering and review of radiographic studies, pulse oximetry, re-evaluation of patient's condition.  This critical care time did not overlap with that of any other provider or involve time for any procedures.     Zoraida Nicole DO  Critical Care - Surgery  [facility] - [facility]

## 2024-12-13 NOTE — ANESTHESIA PROCEDURE NOTES
Central Line    Diagnosis: mitral insufficiency  Patient location during procedure: done in OR  Procedure Urgency: Routine      Staffing  Authorizing Provider: CHIRAG Guerrero MD  Performing Provider: Kiera Bright MD    Staffing  Performed by: Kiera Bright MD  Authorized by: CHIRAG Guerrero MD    Anesthesiologist was present at the time of the procedure.  Preanesthetic Checklist  Completed: patient identified, IV checked, site marked, risks and benefits discussed, surgical consent, monitors and equipment checked, pre-op evaluation, timeout performed and anesthesia consent given  Indication   Indication: vascular access, hemodynamic monitoring, med administration     Anesthesia   general anesthesia    Central Line   Skin Prep: skin prepped with ChloraPrep, skin prep agent completely dried prior to procedure  Sterile Barriers Followed: Yes    All five maximal barriers used- gloves, gown, cap, mask, and large sterile sheet    hand hygiene performed prior to central venous catheter insertion  Location: right internal jugular.   Catheter type: triple lumen  Catheter Size: 14 Fr  Ultrasound: vascular probe with ultrasound   Vessel Caliber: medium, patent, compressibility normal  Needle advanced into vessel with real time Ultrasound guidance.  Image recorded and saved.  sterile gel and probe cover used in ultrasound-guided central venous catheter insertion   Manometry: Venous cannualation confirmed by visual estimation of blood vessel pressure using manometry.  Insertion Attempts: 1   Securement:line sutured, chlorhexidine patch, sterile dressing applied and blood return through all ports    Post-Procedure    Adverse Events:none      Guidewire Guidewire removed intact.

## 2024-12-13 NOTE — TRANSFER OF CARE
"Anesthesia Transfer of Care Note    Patient: Wiliam Arreola    Procedure(s) Performed: Procedure(s) (LRB):  REPAIR, MITRAL VALVE, OPEN (N/A)    Patient location: ICU    Anesthesia Type: general    Transport from OR: Transported from OR intubated on 100% O2  with adequate ventilation controlled by transport ventilator. Continuous ECG monitoring in transport. Continuous SpO2 monitoring in transport. Continuos invasive BP monitoring in transport    Post pain: adequate analgesia    Post assessment: no apparent anesthetic complications    Post vital signs: stable    Level of consciousness: sedated    Nausea/Vomiting: no nausea/vomiting    Complications: none    Transfer of care protocol was followed      Last vitals: Visit Vitals  BP (!) 194/94 (BP Location: Right arm, Patient Position: Lying)   Pulse 69   Temp 36.7 °C (98.1 °F)   Resp 20   Ht 5' 7" (1.702 m)   Wt 78.1 kg (172 lb 2.9 oz)   SpO2 100%   BMI 26.97 kg/m²     "

## 2024-12-14 LAB
ALBUMIN SERPL BCP-MCNC: 3.4 G/DL (ref 3.5–5.2)
ALLENS TEST: ABNORMAL
ALLENS TEST: ABNORMAL
ALP SERPL-CCNC: 38 U/L (ref 40–150)
ALT SERPL W/O P-5'-P-CCNC: 11 U/L (ref 10–44)
ANION GAP SERPL CALC-SCNC: 9 MMOL/L (ref 8–16)
APTT PPP: 29 SEC (ref 21–32)
AST SERPL-CCNC: 52 U/L (ref 10–40)
BASOPHILS # BLD AUTO: 0.04 K/UL (ref 0–0.2)
BASOPHILS NFR BLD: 0.2 % (ref 0–1.9)
BILIRUB SERPL-MCNC: 0.6 MG/DL (ref 0.1–1)
BUN SERPL-MCNC: 16 MG/DL (ref 8–23)
CALCIUM SERPL-MCNC: 8.5 MG/DL (ref 8.7–10.5)
CHLORIDE SERPL-SCNC: 109 MMOL/L (ref 95–110)
CO2 SERPL-SCNC: 20 MMOL/L (ref 23–29)
CREAT SERPL-MCNC: 1.1 MG/DL (ref 0.5–1.4)
DELSYS: ABNORMAL
DIFFERENTIAL METHOD BLD: ABNORMAL
EOSINOPHIL # BLD AUTO: 0.6 K/UL (ref 0–0.5)
EOSINOPHIL NFR BLD: 2.9 % (ref 0–8)
ERYTHROCYTE [DISTWIDTH] IN BLOOD BY AUTOMATED COUNT: 12.9 % (ref 11.5–14.5)
EST. GFR  (NO RACE VARIABLE): >60 ML/MIN/1.73 M^2
GLUCOSE SERPL-MCNC: 151 MG/DL (ref 70–110)
HCO3 UR-SCNC: 21.6 MMOL/L (ref 24–28)
HCT VFR BLD AUTO: 36.2 % (ref 40–54)
HCT VFR BLD CALC: 37 %PCV (ref 36–54)
HGB BLD-MCNC: 13 G/DL (ref 14–18)
IMM GRANULOCYTES # BLD AUTO: 0.16 K/UL (ref 0–0.04)
IMM GRANULOCYTES NFR BLD AUTO: 0.8 % (ref 0–0.5)
INR PPP: 1 (ref 0.8–1.2)
LDH SERPL L TO P-CCNC: 1.43 MMOL/L (ref 0.36–1.25)
LYMPHOCYTES # BLD AUTO: 0.7 K/UL (ref 1–4.8)
LYMPHOCYTES NFR BLD: 3.8 % (ref 18–48)
MAGNESIUM SERPL-MCNC: 2.1 MG/DL (ref 1.6–2.6)
MCH RBC QN AUTO: 34.2 PG (ref 27–31)
MCHC RBC AUTO-ENTMCNC: 35.9 G/DL (ref 32–36)
MCV RBC AUTO: 95 FL (ref 82–98)
MONOCYTES # BLD AUTO: 2.1 K/UL (ref 0.3–1)
MONOCYTES NFR BLD: 10.8 % (ref 4–15)
NEUTROPHILS # BLD AUTO: 16 K/UL (ref 1.8–7.7)
NEUTROPHILS NFR BLD: 81.5 % (ref 38–73)
NRBC BLD-RTO: 0 /100 WBC
PCO2 BLDA: 34.8 MMHG (ref 35–45)
PH SMN: 7.4 [PH] (ref 7.35–7.45)
PHOSPHATE SERPL-MCNC: 3.1 MG/DL (ref 2.7–4.5)
PLATELET # BLD AUTO: 142 K/UL (ref 150–450)
PLATELET BLD QL SMEAR: ABNORMAL
PMV BLD AUTO: 10.7 FL (ref 9.2–12.9)
PO2 BLDA: 55 MMHG (ref 80–100)
POC BE: -3 MMOL/L
POC IONIZED CALCIUM: 1.23 MMOL/L (ref 1.06–1.42)
POC SATURATED O2: 88 % (ref 95–100)
POC TCO2: 23 MMOL/L (ref 23–27)
POCT GLUCOSE: 104 MG/DL (ref 70–110)
POCT GLUCOSE: 111 MG/DL (ref 70–110)
POCT GLUCOSE: 114 MG/DL (ref 70–110)
POCT GLUCOSE: 120 MG/DL (ref 70–110)
POCT GLUCOSE: 127 MG/DL (ref 70–110)
POCT GLUCOSE: 128 MG/DL (ref 70–110)
POCT GLUCOSE: 131 MG/DL (ref 70–110)
POCT GLUCOSE: 133 MG/DL (ref 70–110)
POCT GLUCOSE: 137 MG/DL (ref 70–110)
POCT GLUCOSE: 143 MG/DL (ref 70–110)
POCT GLUCOSE: 144 MG/DL (ref 70–110)
POCT GLUCOSE: 144 MG/DL (ref 70–110)
POCT GLUCOSE: 148 MG/DL (ref 70–110)
POCT GLUCOSE: 151 MG/DL (ref 70–110)
POCT GLUCOSE: 155 MG/DL (ref 70–110)
POCT GLUCOSE: 155 MG/DL (ref 70–110)
POCT GLUCOSE: 158 MG/DL (ref 70–110)
POCT GLUCOSE: 161 MG/DL (ref 70–110)
POCT GLUCOSE: 166 MG/DL (ref 70–110)
POCT GLUCOSE: 167 MG/DL (ref 70–110)
POTASSIUM BLD-SCNC: 4 MMOL/L (ref 3.5–5.1)
POTASSIUM SERPL-SCNC: 3.8 MMOL/L (ref 3.5–5.1)
POTASSIUM SERPL-SCNC: 4 MMOL/L (ref 3.5–5.1)
POTASSIUM SERPL-SCNC: 4.6 MMOL/L (ref 3.5–5.1)
PROT SERPL-MCNC: 5.4 G/DL (ref 6–8.4)
PROTHROMBIN TIME: 11.3 SEC (ref 9–12.5)
RBC # BLD AUTO: 3.8 M/UL (ref 4.6–6.2)
SAMPLE: ABNORMAL
SAMPLE: ABNORMAL
SITE: ABNORMAL
SITE: ABNORMAL
SODIUM BLD-SCNC: 139 MMOL/L (ref 136–145)
SODIUM SERPL-SCNC: 138 MMOL/L (ref 136–145)
WBC # BLD AUTO: 19.6 K/UL (ref 3.9–12.7)

## 2024-12-14 PROCEDURE — 99232 SBSQ HOSP IP/OBS MODERATE 35: CPT | Mod: ,,, | Performed by: NURSE PRACTITIONER

## 2024-12-14 PROCEDURE — 63600175 PHARM REV CODE 636 W HCPCS

## 2024-12-14 PROCEDURE — 84132 ASSAY OF SERUM POTASSIUM: CPT

## 2024-12-14 PROCEDURE — 27000221 HC OXYGEN, UP TO 24 HOURS

## 2024-12-14 PROCEDURE — 25000003 PHARM REV CODE 250: Performed by: THORACIC SURGERY (CARDIOTHORACIC VASCULAR SURGERY)

## 2024-12-14 PROCEDURE — 25000003 PHARM REV CODE 250: Performed by: NURSE PRACTITIONER

## 2024-12-14 PROCEDURE — 80053 COMPREHEN METABOLIC PANEL: CPT | Performed by: STUDENT IN AN ORGANIZED HEALTH CARE EDUCATION/TRAINING PROGRAM

## 2024-12-14 PROCEDURE — 85014 HEMATOCRIT: CPT

## 2024-12-14 PROCEDURE — 97165 OT EVAL LOW COMPLEX 30 MIN: CPT

## 2024-12-14 PROCEDURE — 99900035 HC TECH TIME PER 15 MIN (STAT)

## 2024-12-14 PROCEDURE — 37799 UNLISTED PX VASCULAR SURGERY: CPT

## 2024-12-14 PROCEDURE — 85730 THROMBOPLASTIN TIME PARTIAL: CPT

## 2024-12-14 PROCEDURE — C9248 INJ, CLEVIDIPINE BUTYRATE: HCPCS | Performed by: STUDENT IN AN ORGANIZED HEALTH CARE EDUCATION/TRAINING PROGRAM

## 2024-12-14 PROCEDURE — C9248 INJ, CLEVIDIPINE BUTYRATE: HCPCS

## 2024-12-14 PROCEDURE — 25000003 PHARM REV CODE 250

## 2024-12-14 PROCEDURE — 94640 AIRWAY INHALATION TREATMENT: CPT

## 2024-12-14 PROCEDURE — 94761 N-INVAS EAR/PLS OXIMETRY MLT: CPT | Mod: XB

## 2024-12-14 PROCEDURE — 20000000 HC ICU ROOM

## 2024-12-14 PROCEDURE — 97116 GAIT TRAINING THERAPY: CPT

## 2024-12-14 PROCEDURE — 63600175 PHARM REV CODE 636 W HCPCS: Performed by: NURSE PRACTITIONER

## 2024-12-14 PROCEDURE — 51798 US URINE CAPACITY MEASURE: CPT

## 2024-12-14 PROCEDURE — 25000242 PHARM REV CODE 250 ALT 637 W/ HCPCS

## 2024-12-14 PROCEDURE — 63600175 PHARM REV CODE 636 W HCPCS: Performed by: STUDENT IN AN ORGANIZED HEALTH CARE EDUCATION/TRAINING PROGRAM

## 2024-12-14 PROCEDURE — 82803 BLOOD GASES ANY COMBINATION: CPT

## 2024-12-14 PROCEDURE — 85025 COMPLETE CBC W/AUTO DIFF WBC: CPT

## 2024-12-14 PROCEDURE — 97163 PT EVAL HIGH COMPLEX 45 MIN: CPT

## 2024-12-14 PROCEDURE — 83605 ASSAY OF LACTIC ACID: CPT

## 2024-12-14 PROCEDURE — 84295 ASSAY OF SERUM SODIUM: CPT

## 2024-12-14 PROCEDURE — 82330 ASSAY OF CALCIUM: CPT

## 2024-12-14 PROCEDURE — 99291 CRITICAL CARE FIRST HOUR: CPT | Mod: ,,, | Performed by: STUDENT IN AN ORGANIZED HEALTH CARE EDUCATION/TRAINING PROGRAM

## 2024-12-14 PROCEDURE — 84100 ASSAY OF PHOSPHORUS: CPT

## 2024-12-14 PROCEDURE — 97530 THERAPEUTIC ACTIVITIES: CPT

## 2024-12-14 PROCEDURE — 85610 PROTHROMBIN TIME: CPT

## 2024-12-14 PROCEDURE — 25000003 PHARM REV CODE 250: Performed by: STUDENT IN AN ORGANIZED HEALTH CARE EDUCATION/TRAINING PROGRAM

## 2024-12-14 PROCEDURE — 83735 ASSAY OF MAGNESIUM: CPT

## 2024-12-14 RX ORDER — IBUPROFEN 200 MG
16 TABLET ORAL
Status: DISCONTINUED | OUTPATIENT
Start: 2024-12-14 | End: 2024-12-17

## 2024-12-14 RX ORDER — NIFEDIPINE 30 MG/1
30 TABLET, EXTENDED RELEASE ORAL DAILY
Status: DISCONTINUED | OUTPATIENT
Start: 2024-12-14 | End: 2024-12-14

## 2024-12-14 RX ORDER — GLUCAGON 1 MG
1 KIT INJECTION
Status: DISCONTINUED | OUTPATIENT
Start: 2024-12-14 | End: 2024-12-17

## 2024-12-14 RX ORDER — HEPARIN SODIUM 5000 [USP'U]/ML
7500 INJECTION, SOLUTION INTRAVENOUS; SUBCUTANEOUS EVERY 8 HOURS
Status: DISCONTINUED | OUTPATIENT
Start: 2024-12-14 | End: 2024-12-19

## 2024-12-14 RX ORDER — IBUPROFEN 200 MG
24 TABLET ORAL
Status: DISCONTINUED | OUTPATIENT
Start: 2024-12-14 | End: 2024-12-17

## 2024-12-14 RX ORDER — NIFEDIPINE 30 MG/1
30 TABLET, EXTENDED RELEASE ORAL DAILY
Status: DISCONTINUED | OUTPATIENT
Start: 2024-12-14 | End: 2024-12-15

## 2024-12-14 RX ORDER — ACETAMINOPHEN 325 MG/1
650 TABLET ORAL EVERY 8 HOURS
Status: DISCONTINUED | OUTPATIENT
Start: 2024-12-14 | End: 2024-12-21 | Stop reason: HOSPADM

## 2024-12-14 RX ORDER — MAGNESIUM SULFATE HEPTAHYDRATE 40 MG/ML
2 INJECTION, SOLUTION INTRAVENOUS ONCE
Status: COMPLETED | OUTPATIENT
Start: 2024-12-14 | End: 2024-12-14

## 2024-12-14 RX ORDER — INSULIN ASPART 100 [IU]/ML
0-5 INJECTION, SOLUTION INTRAVENOUS; SUBCUTANEOUS EVERY 4 HOURS PRN
Status: DISCONTINUED | OUTPATIENT
Start: 2024-12-14 | End: 2024-12-17

## 2024-12-14 RX ADMIN — INSULIN ASPART 1 UNITS: 100 INJECTION, SOLUTION INTRAVENOUS; SUBCUTANEOUS at 05:12

## 2024-12-14 RX ADMIN — NIFEDIPINE 30 MG: 30 TABLET, FILM COATED, EXTENDED RELEASE ORAL at 10:12

## 2024-12-14 RX ADMIN — FAMOTIDINE 20 MG: 10 INJECTION, SOLUTION INTRAVENOUS at 09:12

## 2024-12-14 RX ADMIN — HEPARIN SODIUM 7500 UNITS: 5000 INJECTION INTRAVENOUS; SUBCUTANEOUS at 09:12

## 2024-12-14 RX ADMIN — CLEVIPIDINE 1 MG/HR: 0.5 EMULSION INTRAVENOUS at 08:12

## 2024-12-14 RX ADMIN — CEFAZOLIN 2 G: 2 INJECTION, POWDER, FOR SOLUTION INTRAMUSCULAR; INTRAVENOUS at 01:12

## 2024-12-14 RX ADMIN — MAGNESIUM SULFATE HEPTAHYDRATE 2 G: 40 INJECTION, SOLUTION INTRAVENOUS at 12:12

## 2024-12-14 RX ADMIN — IPRATROPIUM BROMIDE AND ALBUTEROL SULFATE 3 ML: 2.5; .5 SOLUTION RESPIRATORY (INHALATION) at 03:12

## 2024-12-14 RX ADMIN — CEFAZOLIN 2 G: 2 INJECTION, POWDER, FOR SOLUTION INTRAMUSCULAR; INTRAVENOUS at 04:12

## 2024-12-14 RX ADMIN — INSULIN HUMAN 1.2 UNITS/HR: 1 INJECTION, SOLUTION INTRAVENOUS at 12:12

## 2024-12-14 RX ADMIN — INSULIN ASPART 1 UNITS: 100 INJECTION, SOLUTION INTRAVENOUS; SUBCUTANEOUS at 08:12

## 2024-12-14 RX ADMIN — INSULIN ASPART 1 UNITS: 100 INJECTION, SOLUTION INTRAVENOUS; SUBCUTANEOUS at 01:12

## 2024-12-14 RX ADMIN — INSULIN ASPART 1 UNITS: 100 INJECTION, SOLUTION INTRAVENOUS; SUBCUTANEOUS at 02:12

## 2024-12-14 RX ADMIN — OXYCODONE HYDROCHLORIDE 10 MG: 10 TABLET ORAL at 03:12

## 2024-12-14 RX ADMIN — ASPIRIN 325 MG ORAL TABLET 325 MG: 325 PILL ORAL at 09:12

## 2024-12-14 RX ADMIN — DEXTROSE, SODIUM CHLORIDE, AND POTASSIUM CHLORIDE: 5; .45; .15 INJECTION INTRAVENOUS at 12:12

## 2024-12-14 RX ADMIN — IPRATROPIUM BROMIDE AND ALBUTEROL SULFATE 3 ML: 2.5; .5 SOLUTION RESPIRATORY (INHALATION) at 12:12

## 2024-12-14 RX ADMIN — ATORVASTATIN CALCIUM 40 MG: 40 TABLET, FILM COATED ORAL at 09:12

## 2024-12-14 RX ADMIN — OXYCODONE 5 MG: 5 TABLET ORAL at 02:12

## 2024-12-14 RX ADMIN — INSULIN ASPART 1 UNITS: 100 INJECTION, SOLUTION INTRAVENOUS; SUBCUTANEOUS at 12:12

## 2024-12-14 RX ADMIN — INSULIN ASPART 1 UNITS: 100 INJECTION, SOLUTION INTRAVENOUS; SUBCUTANEOUS at 03:12

## 2024-12-14 RX ADMIN — OXYCODONE 5 MG: 5 TABLET ORAL at 08:12

## 2024-12-14 RX ADMIN — IPRATROPIUM BROMIDE AND ALBUTEROL SULFATE 3 ML: 2.5; .5 SOLUTION RESPIRATORY (INHALATION) at 08:12

## 2024-12-14 RX ADMIN — IPRATROPIUM BROMIDE AND ALBUTEROL SULFATE 3 ML: 2.5; .5 SOLUTION RESPIRATORY (INHALATION) at 09:12

## 2024-12-14 RX ADMIN — DOCUSATE SODIUM 100 MG: 100 CAPSULE, LIQUID FILLED ORAL at 08:12

## 2024-12-14 RX ADMIN — LEVOTHYROXINE SODIUM 75 MCG: 25 TABLET ORAL at 05:12

## 2024-12-14 RX ADMIN — CLEVIPIDINE 8 MG/HR: 0.5 EMULSION INTRAVENOUS at 04:12

## 2024-12-14 RX ADMIN — MUPIROCIN 1 G: 20 OINTMENT TOPICAL at 08:12

## 2024-12-14 RX ADMIN — ACETAMINOPHEN 650 MG: 325 TABLET ORAL at 09:12

## 2024-12-14 RX ADMIN — CEFAZOLIN 2 G: 2 INJECTION, POWDER, FOR SOLUTION INTRAMUSCULAR; INTRAVENOUS at 09:12

## 2024-12-14 RX ADMIN — POTASSIUM CHLORIDE 20 MEQ: 200 INJECTION, SOLUTION INTRAVENOUS at 10:12

## 2024-12-14 RX ADMIN — HYDROMORPHONE HYDROCHLORIDE 0.5 MG: 1 INJECTION, SOLUTION INTRAMUSCULAR; INTRAVENOUS; SUBCUTANEOUS at 05:12

## 2024-12-14 NOTE — NURSING
"Pt and RN talking about transition from one titratable insulin drip to step-down drip. Sugars checked, explained correction dose was going to be administered. Gives insulin and pt states "how did you know what my sugars were?". Reminds pt of checking sugars. Then asks where girlfriend went, despite no visitors on shift. Few minutes later pt starts talking with English sounds but not full words. Returns to asking and replying to appropriate questions afterwards. Stated "now that it's midnight, when do I get to sleep until?". Reoriented.  Pt appropriately answered questions regarding person, place, situation; disoriented to time. No focal deficits noted. CTS resident at bedside to evaluate.   "

## 2024-12-14 NOTE — PROGRESS NOTES
[facility] - Surgical Intensive Care  Critical Care - Surgery  Progress Note    Patient Name: Wiliam Arreola  MRN: [id number]  Admission Date: 12/13/2024  Hospital Length of Stay: 1 days  Code Status: Prior  Attending Provider: Babatunde Gates MD  Primary Care Provider: Silvio Aranda NP-C   Principal Problem: Rupture of chordae tendineae    Subjective:     Hospital/ICU Course:  No notes on file    Interval History/Significant Events: Extubated yesterday with no complications. Oxygenating well on 2L O2 NC. Started him on aspirin today. Patient progressing well.     Follow-up For: Procedure(s) (LRB):  REPAIR, MITRAL VALVE, OPEN (N/A)    Post-Operative Day: 1 Day Post-Op    Objective:     Vital Signs (Most Recent):  Temp: 97.9 °F (36.6 °C) (12/14/24 1101)  Pulse: 80 (12/14/24 1448)  Resp: (!) 28 (12/14/24 1445)  BP: (!) 153/82 (12/14/24 1445)  SpO2: 95 % (12/14/24 1445) Vital Signs (24h Range):  Temp:  [97.9 °F (36.6 °C)-98.3 °F (36.8 °C)] 97.9 °F (36.6 °C)  Pulse:  [55-84] 80  Resp:  [11-43] 28  SpO2:  [86 %-100 %] 95 %  BP: ()/(57-82) 153/82  Arterial Line BP: ()/(36-73) 136/66     Weight: 85.6 kg (188 lb 11.4 oz)  Body mass index is 29.56 kg/m².      Intake/Output Summary (Last 24 hours) at 12/14/2024 1451  Last data filed at 12/14/2024 1400  Gross per 24 hour   Intake 399.54 ml   Output 2880 ml   Net -2480.46 ml          Physical Exam  Vitals and nursing note reviewed.   Constitutional:       General: He is not in acute distress.     Appearance: He is normal weight.   HENT:      Head: Normocephalic.      Nose: Nose normal.      Mouth/Throat:      Mouth: Mucous membranes are dry.   Eyes:      Conjunctiva/sclera: Conjunctivae normal.      Pupils: Pupils are equal, round, and reactive to light.   Cardiovascular:      Rate and Rhythm: Normal rate and regular rhythm.      Pulses: Normal pulses.   Pulmonary:      Effort: Pulmonary effort is normal. No respiratory distress.      Breath sounds:  Normal breath sounds.   Abdominal:      Palpations: Abdomen is soft.   Musculoskeletal:      Right lower leg: No edema.      Left lower leg: No edema.      Comments: R IJ central line, mediastinal chest tubes x2   Skin:     General: Skin is warm and dry.      Comments: Chest incision dressing clean and intact   Neurological:      General: No focal deficit present.      Mental Status: He is alert and oriented to person, place, and time. Mental status is at baseline.            Vents:  Vent Mode: A/C (12/13/24 1528)  Ventilator Initiated: Yes (12/13/24 1310)  Set Rate: 20 BPM (12/13/24 1528)  Vt Set: 430 mL (12/13/24 1528)  PEEP/CPAP: 5 cmH20 (12/13/24 1528)  Oxygen Concentration (%): 30 (12/13/24 2011)  Peak Airway Pressure: 43 cmH20 (12/13/24 1528)  Plateau Pressure: 0 cmH20 (12/13/24 1528)  Total Ve: 8.48 L/m (12/13/24 1528)  Negative Inspiratory Force (cm H2O): -22 (12/13/24 1740)  F/VT Ratio<105 (RSBI): (!) 42.17 (12/13/24 1528)    Lines/Drains/Airways       Central Venous Catheter Line  Duration             Percutaneous Central Line - Triple Lumen  12/13/24 0954 Internal Jugular Right 1 day              Drain  Duration                  Urethral Catheter 12/13/24 0800 1 day         Y Chest Tube 1 and 2 12/13/24 1214 1 Anterior Mediastinal 19 Fr. 2 Mediastinal 19 Fr. 1 day              Line  Duration                  Pacer Wires 12/13/24 1214 1 day              Peripheral Intravenous Line  Duration                  Peripheral IV - Single Lumen 12/13/24 0615 22 G Distal;Posterior;Right Forearm 1 day         Peripheral IV - Single Lumen 12/13/24 0735 14 G  Left Forearm 1 day                    Significant Labs:    CBC/Anemia Profile:  Recent Labs   Lab 12/13/24  1319 12/13/24  1534 12/13/24 2012 12/14/24  0009 12/14/24  0403   WBC 16.98*  --   --   --  19.60*   HGB 12.2*  --   --   --  13.0*   HCT 34.8*   < > 38 37 36.2*   *  --   --   --  142*   MCV 95  --   --   --  95   RDW 12.5  --   --   --  12.9    < >  = values in this interval not displayed.        Chemistries:  Recent Labs   Lab 12/13/24 2000 12/14/24  0403 12/14/24  0805   NA  --  138  --    K 3.7 4.0 3.8   CL  --  109  --    CO2  --  20*  --    BUN  --  16  --    CREATININE  --  1.1  --    CALCIUM  --  8.5*  --    ALBUMIN  --  3.4*  --    PROT  --  5.4*  --    BILITOT  --  0.6  --    ALKPHOS  --  38*  --    ALT  --  11  --    AST  --  52*  --    MG  --  2.1  --    PHOS  --  3.1  --        All pertinent labs within the past 24 hours have been reviewed.    Significant Imaging:  I have reviewed all pertinent imaging results/findings within the past 24 hours.  Assessment/Plan:     Cardiac/Vascular  Mitral regurgitation due to cusp prolapse    Neuro/Psych:     - Sedation: None    - Pain:     - Scheduled Tylenol 1000mg Q8H   - PRN Oxycodone 5mg             Cardiac:     - S/p mitral valve repair with Dr. Gates    - BP Goal: MAP 60-80    - Inotropes/Pressors: Epinephrine weaned off    - Anti-HTNs: Clevidipine 3 mg/hr. Wean as tolerated    - Consider adding nifedipine 30mg qd if bp uncontrolled    - Rhythm: NSR. Currently being paced at 80 bpm    - Beta Blocker: will start when appropriate    - Statin: high intensity statin, atorvastatin 40mg      Pulmonary:     - Oxygenating well on 2L NC    - Goal SpO2 >92%    - Chest Tubes x2 (Meds)    - ABGS PRN      Renal:    - Trend BUN/Cr     - Maintain Bautista, record strict Is/Os    Recent Labs   Lab 12/12/24  1352 12/14/24  0403   BUN 20 16   CREATININE 1.5* 1.1           FEN / GI:     - Daily CMP, PRN K/Mag/Phos per protocol     - Replace electrolytes as needed    - Nutrition: Advance diet as tolerated    - Bowel Regimen: Miralax, docusate      ID:     - Aebrile    - Abx: Complete perioperative cefazolin 2g Q8H x 5 doses    Recent Labs   Lab 12/12/24  1352 12/13/24  1319 12/14/24  0403   WBC 7.92 16.98* 19.60*           Heme/Onc:     - Hgb 16.7 pre-operatively    - CBC daily    - ASA 325mg    Recent Labs   Lab  12/12/24  1352 12/13/24  1319 12/14/24  0403   HGB 16.7 12.2* 13.0*    145* 142*   APTT 27.8 29.3 29.0   INR 1.0  --  1.0           Endocrine:     - CTS Goal -140    - HgbA1c: 6.1    - Endocrinology consulted for insulin management      PPx:   Feeding: Advance diet as tolerated  Analgesia/Sedation: Scheduled tylenol, prn oxy  Thromboembolic Prevention: ASA  HOB >30: Yes  Stress Ulcer: Yes - famotidine 20mg IV BID  Glucose Control: Yes, insulin management per Endocrinology     Lines/Drains/Airway:   Art Line:L radial   Central Line: R IJ   Bautista    Chest Tubes: x2 (Mediastinal)    Pacing Wires: Temporary V pacing wires      Dispo/Code Status/Palliative:     - Continue SICU Care    - Full Code           Critical care was time spent personally by me on the following activities: development of treatment plan with patient or surrogate and bedside caregivers, discussions with consultants, evaluation of patient's response to treatment, examination of patient, ordering and performing treatments and interventions, ordering and review of laboratory studies, ordering and review of radiographic studies, pulse oximetry, re-evaluation of patient's condition.  This critical care time did not overlap with that of any other provider or involve time for any procedures.     Zoraida Nicole,   Critical Care - Surgery  [facility] - [facility]

## 2024-12-14 NOTE — PT/OT/SLP EVAL
"Physical Therapy Evaluation  Co-evaluation with OT due to acuity of condition, level of skilled assist needed for assessment of safety with mobility.  Patient Name:  Wiliam Arreola   MRN:  [id number]    Recommendations:     Discharge Recommendations: Low Intensity Therapy   Discharge Equipment Recommendations:  (TBD pending progress)   Barriers to discharge: None    Assessment:     Wiliam Arreola is a 79 y.o. male admitted with a medical diagnosis of Rupture of chordae tendineae.  He presents with the following impairments/functional limitations: weakness, impaired endurance, impaired self care skills, impaired functional mobility, gait instability, impaired skin, decreased safety awareness, pain, impaired cardiopulmonary response to activity. The patient's mobility was mainly limited by post-op pain, difficulty following cues to adhere to sternal precautions with mobility. Patient consistently using UE to scoot to edge of bed in spite of constant verbal and tactile cues. He ambulated 14' with EBONI HHA and minimum assistance x2, patient with impaired lateral weight shift, very short shuffling steps, slow deliberate pace. Patient currently demonstrates a need for low intensity therapy on a scheduled basis secondary to a decline in functional status due to surgical procedure     Rehab Prognosis: Good; patient would benefit from acute skilled PT services to address these deficits and reach maximum level of function.    Recent Surgery: Procedure(s) (LRB):  REPAIR, MITRAL VALVE, OPEN (N/A) 1 Day Post-Op    Plan:     During this hospitalization, patient to be seen 4 x/week to address the identified rehab impairments via gait training, therapeutic activities, therapeutic exercises, neuromuscular re-education and progress toward the following goals:    Plan of Care Expires:  01/13/25    Subjective     Chief Complaint: "They keep telling me I'm doing good, I don't think I am"  Patient/Family Comments/goals: return to " PLOF  Pain/Comfort:  Pain Rating 1: 8/10  Location - Orientation 1: generalized  Location 1: chest (and back)  Pain Addressed 1: Reposition, Distraction  Pain Rating Post-Intervention 1: 8/10    Patients cultural, spiritual, Congregational conflicts given the current situation: no    Living Environment:  The patient lives with his girlfriend in a trailer with 3 LUIS [address] EBONI HR. Drives. Retired salesman.   Prior to admission, patients level of function was independent.  Equipment used at home: none.  DME owned (not currently used): none.  Upon discharge, patient will have assistance from girlfriend.    Objective:     Communicated with RN prior to session.  Patient found HOB elevated with pulse ox (continuous), telemetry, blood pressure cuff, peripheral IV, chest tube, vega catheter (pacer)  upon PT entry to room.    General Precautions: Standard, fall, sternal  Orthopedic Precautions:N/A   Braces: N/A  Respiratory Status: Room air    Exams:    Cognitive Exam  Patient is A&O x4 and follows 100% of one -step commands    Fine Motor Coordination   -       WFL     Postural Exam Patient presented with the following abnormalities:    -       Rounded shoulders  -       Forward head  -       Kyphosis  -       Posterior pelvic tilt  -         Sensation    -       Light touch intact EBONI LE   Skin Integrity/Edema     -       Skin integrity: incision clean and dry  -       Edema: mild swelling EBONI LE   R LE ROM WFL   R LE Strength WFL   L LE ROM WFL   L LE Strength  WFL       Balance   Static Sitting stand by assistance    Dynamic Sitting contact guard assist    Static Standing contact guard assist    Dynamic Standing       minimum assistance x2 HHA        Functional Mobility:    Bed Mobility  Rolling to R: moderate assistance   Supine to Sit on the R side:  moderate assistance   Sit to supine: minimum assistance x2 at trunk and LE  Scoot to HOB in supine: total assistance x2 drawsheet  Scoot to EOB in sitting: minimum assistance-  patient using UE to scoot to edge of bed in spite of constant verbal/tactile cues not to WB through UE   Transfers Sit to Stand:  contact guard assist, able to stand without use of UE   Gait  Gait Distance: 14 ft with EBONI HHA  Assistance Level: minimum assistance x2  Description: impaired lateral weight shift, wide JAN, short shuffling steps- dragging LE to progress, slow deliberate katharina          AM-PAC 6 CLICK MOBILITY  Total Score:15     Treatment & Education:  Patient educated on:  -role of therapy  -goals of session  -PT POC  -benefits of out of bed mobility and consequences of immobility  -calling for staff assist to mobilize safely  Patient agreeable to mobilize with therapy.      Patient educated on sternal precautions (no push/pull/pickup), patient verbalized understanding. He required cuing 75% of the time to adhere to precautions with mobility (required increased cuing to avoid use of UE with supine to sit and scooting to EOB; pt able to stand without use of UE).     Gait training: cued for upright posture, reciprocal strides, facilitation for lateral weight shift, cued for increased step length, pacing for energy conservation     Patient encouraged to ambulate, sit up in chair 3x/day to prevent deconditioning during hospitalization. Patient verbalized understanding and agreement to mobilize only with RN assist for safety.     Patient left HOB elevated with all lines intact, call button in reach, and RN notified.    GOALS:   Multidisciplinary Problems       Physical Therapy Goals          Problem: Physical Therapy    Goal Priority Disciplines Outcome Interventions   Physical Therapy Goal     PT, PT/OT Progressing    Description: Goals to be met by:      Patient will increase functional independence with mobility by performin. Supine to sit with Modified [place]  2. Sit to supine with Modified [place]  3. Sit to stand transfer with Modified [place]  4. Gait  x 400 feet with  Modified [place] using No Assistive Device.   5. Ascend/descend 3 stair with bilateral Handrails Contact Guard Assistance using LRAD.                          History:     History reviewed. No pertinent past medical history.    History reviewed. No pertinent surgical history.    Time Tracking:     PT Received On: 12/14/24  PT Start Time: 1430     PT Stop Time: 1500  PT Total Time (min): 30 min     Billable Minutes: Evaluation 10 and Gait Training 20      12/14/2024

## 2024-12-14 NOTE — PLAN OF CARE
Problem: Occupational Therapy  Goal: Occupational Therapy Goal  Description: Goals to be met by: 12/28/2024     Patient will increase functional independence with ADLs by performing:    UE Dressing with Set-up Assistance.  LE Dressing with Stand-by Assistance.  Grooming while standing at sink with Stand-by Assistance.  Toileting from toilet with Stand-by Assistance for hygiene and clothing management.   Supine to sit with Stand-by Assistance.  Step transfer with Stand-by Assistance.  Toilet transfer to toilet with Stand-by Assistance.    Outcome: Progressing     Pt evaluated and OT goals established.

## 2024-12-14 NOTE — SUBJECTIVE & OBJECTIVE
Interval History/Significant Events: Extubated yesterday with no complications. Oxygenating well on 2L O2 NC. Started him on aspirin today. Patient progressing well.     Follow-up For: Procedure(s) (LRB):  REPAIR, MITRAL VALVE, OPEN (N/A)    Post-Operative Day: 1 Day Post-Op    Objective:     Vital Signs (Most Recent):  Temp: 97.9 °F (36.6 °C) (12/14/24 1101)  Pulse: 80 (12/14/24 1448)  Resp: (!) 28 (12/14/24 1445)  BP: (!) 153/82 (12/14/24 1445)  SpO2: 95 % (12/14/24 1445) Vital Signs (24h Range):  Temp:  [97.9 °F (36.6 °C)-98.3 °F (36.8 °C)] 97.9 °F (36.6 °C)  Pulse:  [55-84] 80  Resp:  [11-43] 28  SpO2:  [86 %-100 %] 95 %  BP: ()/(57-82) 153/82  Arterial Line BP: ()/(36-73) 136/66     Weight: 85.6 kg (188 lb 11.4 oz)  Body mass index is 29.56 kg/m².      Intake/Output Summary (Last 24 hours) at 12/14/2024 1451  Last data filed at 12/14/2024 1400  Gross per 24 hour   Intake 399.54 ml   Output 2880 ml   Net -2480.46 ml          Physical Exam  Vitals and nursing note reviewed.   Constitutional:       General: He is not in acute distress.     Appearance: He is normal weight.   HENT:      Head: Normocephalic.      Nose: Nose normal.      Mouth/Throat:      Mouth: Mucous membranes are dry.   Eyes:      Conjunctiva/sclera: Conjunctivae normal.      Pupils: Pupils are equal, round, and reactive to light.   Cardiovascular:      Rate and Rhythm: Normal rate and regular rhythm.      Pulses: Normal pulses.   Pulmonary:      Effort: Pulmonary effort is normal. No respiratory distress.      Breath sounds: Normal breath sounds.   Abdominal:      Palpations: Abdomen is soft.   Musculoskeletal:      Right lower leg: No edema.      Left lower leg: No edema.      Comments: R IJ central line, mediastinal chest tubes x2   Skin:     General: Skin is warm and dry.      Comments: Chest incision dressing clean and intact   Neurological:      General: No focal deficit present.      Mental Status: He is alert and oriented to  person, place, and time. Mental status is at baseline.            Vents:  Vent Mode: A/C (12/13/24 1528)  Ventilator Initiated: Yes (12/13/24 1310)  Set Rate: 20 BPM (12/13/24 1528)  Vt Set: 430 mL (12/13/24 1528)  PEEP/CPAP: 5 cmH20 (12/13/24 1528)  Oxygen Concentration (%): 30 (12/13/24 2011)  Peak Airway Pressure: 43 cmH20 (12/13/24 1528)  Plateau Pressure: 0 cmH20 (12/13/24 1528)  Total Ve: 8.48 L/m (12/13/24 1528)  Negative Inspiratory Force (cm H2O): -22 (12/13/24 1740)  F/VT Ratio<105 (RSBI): (!) 42.17 (12/13/24 1528)    Lines/Drains/Airways       Central Venous Catheter Line  Duration             Percutaneous Central Line - Triple Lumen  12/13/24 0954 Internal Jugular Right 1 day              Drain  Duration                  Urethral Catheter 12/13/24 0800 1 day         Y Chest Tube 1 and 2 12/13/24 1214 1 Anterior Mediastinal 19 Fr. 2 Mediastinal 19 Fr. 1 day              Line  Duration                  Pacer Wires 12/13/24 1214 1 day              Peripheral Intravenous Line  Duration                  Peripheral IV - Single Lumen 12/13/24 0615 22 G Distal;Posterior;Right Forearm 1 day         Peripheral IV - Single Lumen 12/13/24 0735 14 G  Left Forearm 1 day                    Significant Labs:    CBC/Anemia Profile:  Recent Labs   Lab 12/13/24  1319 12/13/24  1534 12/13/24 2012 12/14/24  0009 12/14/24  0403   WBC 16.98*  --   --   --  19.60*   HGB 12.2*  --   --   --  13.0*   HCT 34.8*   < > 38 37 36.2*   *  --   --   --  142*   MCV 95  --   --   --  95   RDW 12.5  --   --   --  12.9    < > = values in this interval not displayed.        Chemistries:  Recent Labs   Lab 12/13/24 2000 12/14/24  0403 12/14/24  0805   NA  --  138  --    K 3.7 4.0 3.8   CL  --  109  --    CO2  --  20*  --    BUN  --  16  --    CREATININE  --  1.1  --    CALCIUM  --  8.5*  --    ALBUMIN  --  3.4*  --    PROT  --  5.4*  --    BILITOT  --  0.6  --    ALKPHOS  --  38*  --    ALT  --  11  --    AST  --  52*  --    MG  --   2.1  --    PHOS  --  3.1  --        All pertinent labs within the past 24 hours have been reviewed.    Significant Imaging:  I have reviewed all pertinent imaging results/findings within the past 24 hours.

## 2024-12-14 NOTE — NURSING
SICU PLAN OF CARE    Dx: Rupture of chordae tendineae    Goals of Care: MAP 60-85, HR >65, O2>90    Vital Signs (last 12 hours):   Temp:  [97.7 °F (36.5 °C)-98.1 °F (36.7 °C)]   Pulse:  [65-84]   Resp:  [12-43]   BP: ()/(57-82)   SpO2:  [86 %-98 %]   Arterial Line BP: ()/(41-66)      Neuro: Follows commands ; intermittently aaox4 or confused    Cardiac: normal sinus rhythm; intermittently ventricularly paced    Respiratory: Room Air    Gtts: Insulin    Urine Output: Urethral Catheter  257 mL/shift    Drains: Chest Tube 140cc/shift    Diet: Full Liquid     Labs/Accuchecks: q1 accu, daily labs    Skin:  No evidence of new skin breakdown.  Patient turned q2h, bony prominences protected, and mattress inflated/working correctly.     Shift Events:  OOBTC until 11A. Intrinsic HR increased. Worked w PT/OT.  See flowsheet for further assessment/details.  Family updated on current condition/plan of care, questions answered, and emotional support provided.  MD updated on current condition, vitals, labs, and gtts.

## 2024-12-14 NOTE — PLAN OF CARE
Problem: Physical Therapy  Goal: Physical Therapy Goal  Description: Goals to be met by:      Patient will increase functional independence with mobility by performin. Supine to sit with Modified [place]  2. Sit to supine with Modified [place]  3. Sit to stand transfer with Modified [place]  4. Gait  x 400 feet with Modified [place] using No Assistive Device.   5. Ascend/descend 3 stair with bilateral Handrails Contact Guard Assistance using LRAD.     Outcome: Progressing   Evaluation completed, initiated plan of care.   Charley Strickland, PT  2024

## 2024-12-14 NOTE — ASSESSMENT & PLAN NOTE
Endocrinology consulted for BG management.   BG goal 110-140 CTS    - D/C IIP  - Transition drip at 1.2 units/hr with step-down parameters. (Based on needs while on IIP)  - Novolog (aspart) insulin prn for BG excursions LDC SSI (150/50).  - BG checks q4hr  - Hypoglycemia protocol in place    ** Please notify Endocrine for any change and/or advance in diet**  ** Please call Endocrine for any BG related issues **    Discharge Planning:   TBD. Please notify endocrinology prior to discharge.

## 2024-12-14 NOTE — PROGRESS NOTES
"[facility]  Endocrinology  Progress Note    Admit Date: 12/13/2024     Reason for Consult: Management of prediabetic, Hyperglycemia     Surgical Procedure and Date: s/p MVR on 12/13/2024    Patient is not diabetic and does not currently take any oral/injectable antidiabetic/hypoglycemic medications.         HPI: Wiliam Arreola is a 79 y.o. male with a PMHx of PE, DVT, HTN, severe MR, HLD, who presents to the SICU s/p mitral valve repair with Dr. Gates. On admission, he is intubated, sedated with propofol, and in stable condition. Endocrine consulted to manage hyperglycemia and pre-diabetes in the context of CTS.     Lab Results   Component Value Date    HGBA1C 6.1 (H) 12/12/2024           Interval HPI:   Overnight events: No acute events overnight. Patient in room 40366/47981 A. Blood glucose stable. BG at goal on current insulin regimen (IIP). Steroid use- None. 1 Day Post-Op  Renal function- Normal   Vasopressors-  Epinephrine       Endocrine will continue to follow and manage insulin orders inpatient.       Diet NPO Except for: Sips with Medication     Eating:   NPO  Nausea: No  Hypoglycemia and intervention: No  Fever: No  TPN and/or TF: No    BP (!) 110/58 (BP Location: Right arm, Patient Position: Lying)   Pulse 77   Temp 97.9 °F (36.6 °C) (Oral)   Resp (!) 24   Ht 5' 7" (1.702 m)   Wt 85.6 kg (188 lb 11.4 oz)   SpO2 (!) 92%   BMI 29.56 kg/m²     Labs Reviewed and Include    Recent Labs   Lab 12/14/24  0403 12/14/24  0805   *  --    CALCIUM 8.5*  --    ALBUMIN 3.4*  --    PROT 5.4*  --      --    K 4.0 3.8   CO2 20*  --      --    BUN 16  --    CREATININE 1.1  --    ALKPHOS 38*  --    ALT 11  --    AST 52*  --    BILITOT 0.6  --      Lab Results   Component Value Date    WBC 19.60 (H) 12/14/2024    HGB 13.0 (L) 12/14/2024    HCT 36.2 (L) 12/14/2024    MCV 95 12/14/2024     (L) 12/14/2024     No results for input(s): "TSH", "FREET4" in the last 168 " "hours.  Lab Results   Component Value Date    HGBA1C 6.1 (H) 12/12/2024       Nutritional status:   Body mass index is 29.56 kg/m².  Lab Results   Component Value Date    ALBUMIN 3.4 (L) 12/14/2024    ALBUMIN 3.9 12/12/2024     No results found for: "PREALBUMIN"    Estimated Creatinine Clearance: 56 mL/min (based on SCr of 1.1 mg/dL).    Accu-Checks  Recent Labs     12/14/24  0107 12/14/24  0308 12/14/24  0405 12/14/24  0509 12/14/24  0608 12/14/24  0659 12/14/24  0802 12/14/24  0910 12/14/24  1000 12/14/24  1058   POCTGLUCOSE 127* 137* 148* 151* 128* 144* 111* 104 114* 144*       Current Medications and/or Treatments Impacting Glycemic Control  Immunotherapy:    Immunosuppressants       None          Steroids:   Hormones (From admission, onward)      None          Pressors:    Autonomic Drugs (From admission, onward)      Start     Stop Route Frequency Ordered    12/13/24 1245  EPINEPHrine 5 mg in dextrose 5% 250 mL infusion (premix)        Question Answer Comment   Begin at (in mcg/kg/min): 0.02    Titrate by: (in mcg/kg/min) 0.02    Titrate interval: (in minutes) 5    Titrate to maintain: (SBP or MAP or Cardiac Index) MAP    Greater than: (in mmHg) 65    Cardiac index greater than: (in L/min) 2.2    Maximum dose: (in mcg/kg/min) 2        -- IV Continuous 12/13/24 1244          Hyperglycemia/Diabetes Medications:   Antihyperglycemics (From admission, onward)      Start     Stop Route Frequency Ordered    12/14/24 1245  insulin aspart U-100 pen 0-5 Units         -- SubQ Every 4 hours PRN 12/14/24 1145    12/14/24 1245  insulin regular in 0.9 % NaCl 100 unit/100 mL (1 unit/mL) infusion        Question:  Enter initial dose (Units/hr):  Answer:  1.2    -- IV Continuous 12/14/24 1145            ASSESSMENT and PLAN    Cardiac/Vascular  * Rupture of chordae tendineae  S/p MVR  Optimize BG control to improve wound healing        Mitral regurgitation due to cusp prolapse  Managed per primary team  Avoid " hypoglycemia        Endocrine  Prediabetes  Endocrinology consulted for BG management.   BG goal 110-140 CTS    - D/C IIP  - Transition drip at 1.2 units/hr with step-down parameters. (Based on needs while on IIP)  - Novolog (aspart) insulin prn for BG excursions LDC SSI (150/50).  - BG checks q4hr  - Hypoglycemia protocol in place    ** Please notify Endocrine for any change and/or advance in diet**  ** Please call Endocrine for any BG related issues **    Discharge Planning:   TBD. Please notify endocrinology prior to discharge.             Arie Villalobos, DNP, FNP  Endocrinology  [facility]

## 2024-12-14 NOTE — PLAN OF CARE
Please link with resident progress note from 12/14/24    CVICU Staff Addendum  I have reviewed and concur with the resident's history, physical, assessment, and plan.  I have personally interviewed and examined the patient at bedside.  See below for any additional findings.    Reason for admission:  Rupture of chordae tendineae  Present on Admission:   Rupture of chordae tendineae   Mitral regurgitation due to cusp prolapse      Goals for Today:   - extubated yesterday, progressing adequately, epi weaned off yesterday, no vasoactive drip at this time, paced at 80 for underlying sinus bradycardia with multiple PVCs and PACs  - multimodal analgesia  - can add nifedipine 30mg XL/day if HTN, no paul blocking agents for now  - c/w pacing for now  - MgSO4 2 gm today  - OOB/IS/PT/OT  - Nebs prn given h/o smoking with chest PT  - CLD and advance as able to cardiac diet  - I/Os  - d/c Joaquin/ CVL  - No lasix at this time, can consider 20mg bolus if O2 requirements go up  - , SQH  - perioperative cefazolin  - SCDs    35 minutes of critical care time was spent personally by me on the following activities: development of treatment plan with patient or surrogate and bedside caregivers, discussions with consultants, evaluation of patient's response to treatment, examination of patient, ordering and performing treatments and interventions, ordering and review of laboratory studies, ordering and review of radiographic studies, pulse oximetry, re-evaluation of patient's condition.  This critical care time did not overlap with that of any other provider or involve time for any procedures.    Rahul Blancas MD  Anesthesiology and Critical Care Medicine  [facility], [address]

## 2024-12-14 NOTE — PLAN OF CARE
SICU PLAN OF CARE NOTE    Dx: Rupture of chordae tendineae    Shift Events: Patient weaned off AIRVO to NC. Oxycodone 10mg x3 and Dilaudid 0.5mg x2 given for pain with moderate relief. OOBTC. Patient updated on plan of care and verbalized understanding,     Gtts: Cleviprex, Insulin     Neuro: AAO x4, Follows Commands, and Moves All Extremities    Cardiac: Ventricular Paced @ 80 bpm    Respiratory: 3L Nasal Cannula    GI: NPO and Sips with Meds    : Urinary Catheter 1345 cc/shift    Drains: Y'd M Chest Tube, total output 246 cc /  shift     Labs/Accuchecks: Q1 Accu, Q12 K+, Daily AM labs     Skin: No new skin breakdown noted. Patient turned PRN, bony prominences protected, SCDs on, and mattress inflated and working. See flowsheet for further assessment detail.

## 2024-12-14 NOTE — SUBJECTIVE & OBJECTIVE
"Interval HPI:   Overnight events: No acute events overnight. Patient in room 27390/21805 A. Blood glucose stable. BG at goal on current insulin regimen (IIP). Steroid use- None. 1 Day Post-Op  Renal function- Normal   Vasopressors-  None       Endocrine will continue to follow and manage insulin orders inpatient.       Diet NPO Except for: Sips with Medication     Eating:   NPO  Nausea: No  Hypoglycemia and intervention: No  Fever: No  TPN and/or TF: No    BP (!) 110/58 (BP Location: Right arm, Patient Position: Lying)   Pulse 77   Temp 97.9 °F (36.6 °C) (Oral)   Resp (!) 24   Ht 5' 7" (1.702 m)   Wt 85.6 kg (188 lb 11.4 oz)   SpO2 (!) 92%   BMI 29.56 kg/m²     Labs Reviewed and Include    Recent Labs   Lab 12/14/24  0403 12/14/24  0805   *  --    CALCIUM 8.5*  --    ALBUMIN 3.4*  --    PROT 5.4*  --      --    K 4.0 3.8   CO2 20*  --      --    BUN 16  --    CREATININE 1.1  --    ALKPHOS 38*  --    ALT 11  --    AST 52*  --    BILITOT 0.6  --      Lab Results   Component Value Date    WBC 19.60 (H) 12/14/2024    HGB 13.0 (L) 12/14/2024    HCT 36.2 (L) 12/14/2024    MCV 95 12/14/2024     (L) 12/14/2024     No results for input(s): "TSH", "FREET4" in the last 168 hours.  Lab Results   Component Value Date    HGBA1C 6.1 (H) 12/12/2024       Nutritional status:   Body mass index is 29.56 kg/m².  Lab Results   Component Value Date    ALBUMIN 3.4 (L) 12/14/2024    ALBUMIN 3.9 12/12/2024     No results found for: "PREALBUMIN"    Estimated Creatinine Clearance: 56 mL/min (based on SCr of 1.1 mg/dL).    Accu-Checks  Recent Labs     12/14/24  0107 12/14/24  0308 12/14/24  0405 12/14/24  0509 12/14/24  0608 12/14/24  0659 12/14/24  0802 12/14/24  0910 12/14/24  1000 12/14/24  1058   POCTGLUCOSE 127* 137* 148* 151* 128* 144* 111* 104 114* 144*       Current Medications and/or Treatments Impacting Glycemic Control  Immunotherapy:    Immunosuppressants       None          Steroids:   Hormones " (From admission, onward)      None          Pressors:    Autonomic Drugs (From admission, onward)      Start     Stop Route Frequency Ordered    12/13/24 1245  EPINEPHrine 5 mg in dextrose 5% 250 mL infusion (premix)        Question Answer Comment   Begin at (in mcg/kg/min): 0.02    Titrate by: (in mcg/kg/min) 0.02    Titrate interval: (in minutes) 5    Titrate to maintain: (SBP or MAP or Cardiac Index) MAP    Greater than: (in mmHg) 65    Cardiac index greater than: (in L/min) 2.2    Maximum dose: (in mcg/kg/min) 2        -- IV Continuous 12/13/24 1244          Hyperglycemia/Diabetes Medications:   Antihyperglycemics (From admission, onward)      Start     Stop Route Frequency Ordered    12/14/24 1245  insulin aspart U-100 pen 0-5 Units         -- SubQ Every 4 hours PRN 12/14/24 1145    12/14/24 1245  insulin regular in 0.9 % NaCl 100 unit/100 mL (1 unit/mL) infusion        Question:  Enter initial dose (Units/hr):  Answer:  1.2    -- IV Continuous 12/14/24 1145

## 2024-12-14 NOTE — PT/OT/SLP EVAL
"Occupational Therapy   Evaluation and Treatment    Co-evaluation with PT to have 2 skilled therapists present to safely assess pt's functional mobility.     Name: Wiliam Arreola  MRN: [id number]  Admitting Diagnosis: Rupture of chordae tendineae  Recent Surgery: Procedure(s) (LRB):  REPAIR, MITRAL VALVE, OPEN (N/A) 1 Day Post-Op    Recommendations:     Discharge Recommendations: Low Intensity Therapy  Discharge Equipment Recommendations:  other (see comments) (TBD pending progress)  Barriers to discharge:  Inaccessible home environment ([address] his residence)    Assessment:     Wiliam Arreola is a 79 y.o. male with a medical diagnosis of Rupture of chordae tendineae.  Pt tolerated session well and without incident despite 8/10 chest and back pain.  However, he requires (A) with ADLs and mobility and is performing below his functional baseline.  Due to his current level of function compared to his PLOF and his home environment, low intensity therapy recommended at d/c to ensure pt's safety and to maximize his functional independence.  He presents with the following.  Performance deficits affecting function: weakness, impaired endurance, impaired self care skills, impaired functional mobility, gait instability, impaired balance, decreased upper extremity function, pain, decreased safety awareness, impaired cardiopulmonary response to activity.      Rehab Prognosis: Good; patient would benefit from acute skilled OT services to address these deficits and reach maximum level of function.       Plan:     Patient to be seen 5 x/week to address the above listed problems via self-care/home management, therapeutic activities, therapeutic exercises  Plan of Care Expires: 01/12/25  Plan of Care Reviewed with: patient    Subjective   "I was just about to go to sleep."  Chief Complaint: chest and back pain, weakness  Patient/Family Comments/goals: to was pleasant and agreeable to therapy    Occupational Profile:  Living " Environment: Pt lives with his girlfriend in a trailer with [address] with BHR that can be reached simultaneously.  He reports no recent falls.   Previous level of function: Independent with ADLs and mobility.  Pt drives.  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      Pain/Comfort:  Pain Rating 1: 8/10  Location - Orientation 1: generalized  Location 1: chest (and back (baseline back pain))  Pain Addressed 1: Cessation of Activity, Reposition, Distraction  Pain Rating Post-Intervention 1: 8/10    Patients cultural, spiritual, Christianity conflicts given the current situation: no    Objective:     Communicated with: nurse and PT prior to session.  Patient found HOB elevated with telemetry, pulse ox (continuous), blood pressure cuff, chest tube, peripheral IV, central line, evga catheter, Other (comments) (pacer wires) upon OT entry to room.    General Precautions: Standard, fall, sternal  Orthopedic Precautions: N/A  Braces: N/A  Respiratory Status: Room air    Occupational Performance:    Bed Mobility:    Patient completed Supine to Sit to the R with moderate assistance for trunk management  Pt scooted to EOB with CGA and maximal cueing to adhere to his sternal precautions, which he did not adhere to while scooting EOB (did for rest of the session).  Patient completed Sit to Supine with minimum assistance and 2 persons  Pt scooted to HOB while supine with total assistance of 2 persons via draw sheet     Functional Mobility/Transfers:  Patient completed Sit <> Stand Transfer from EOB x 1 trial with contact guard assistance  with  no assistive device   Functional Mobility: Pt ambulated ~20 ft in his room with Min A of 2 with no AD and HHA.  He took shuffled steps but had no overt LOB or reports of dizziness.  Pt declined to sit in the bedside chair due to lethargy and had performed earlier in the day (confirmed by nurse).      Activities of Daily Living:  Upper Body Dressing: moderate assistance to don/doff back gown as a robe/manage lines while seated EOB  Feeding: setup assistance to drink cup of water while HOB elevated  Pt declined to perform other ADLs     Cognitive/Visual Perceptual:  Cognitive/Psychosocial Skills:     -       Oriented to: Person, Place, Time, and Situation   -       Follows Commands/attention: Follows most one-step commands but was inattentive to sternal precaution cues while scooting EOB.   -       Communication: clear/fluent    Physical Exam:  Upper Extremity Range of Motion:     -       Right Upper Extremity: WFL  -       Left Upper Extremity: WFL  Upper Extremity Strength:    -       Right Upper Extremity: not formally assessed due to sternal precautions  -       Left Upper Extremity: not formally assessed due to sternal precautions   Strength:    -       Right Upper Extremity: WFL  -       Left Upper Extremity: WFL    AMPAC 6 Click ADL:  AMPAC Total Score: 18    Treatment & Education:  Pt edu on role of OT, POC, his sternal precautions, safety when performing self care tasks, benefit of performing OOB activity, and safety when performing functional transfers and mobility.  - White board updated  - Self care tasks completed-- as noted above      Patient left HOB elevated with all lines intact, call button in reach, and nurse notified    GOALS:   Multidisciplinary Problems       Occupational Therapy Goals          Problem: Occupational Therapy    Goal Priority Disciplines Outcome Interventions   Occupational Therapy Goal     OT, PT/OT Progressing    Description: Goals to be met by: 12/28/2024     Patient will increase functional independence with ADLs by performing:    UE Dressing with Set-up Assistance.  LE Dressing with Stand-by Assistance.  Grooming while standing at sink with Stand-by Assistance.  Toileting from toilet with Stand-by Assistance for hygiene and clothing management.   Supine to sit with  Stand-by Assistance.  Step transfer with Stand-by Assistance.  Toilet transfer to toilet with Stand-by Assistance.                         History:     History reviewed. No pertinent past medical history.    History reviewed. No pertinent surgical history.    Time Tracking:     OT Date of Treatment: 12/14/24  OT Start Time: 1431  OT Stop Time: 1500  OT Total Time (min): 29 min    Billable Minutes:Evaluation 10 min  Therapeutic Activity 19 min    12/14/2024

## 2024-12-14 NOTE — ASSESSMENT & PLAN NOTE
Neuro/Psych:     - Sedation: None    - Pain:     - Scheduled Tylenol 1000mg Q8H   - PRN Oxycodone 5mg             Cardiac:     - S/p mitral valve repair with Dr. Gates    - BP Goal: MAP 60-80    - Inotropes/Pressors: Epinephrine weaned off    - Anti-HTNs: Clevidipine 3 mg/hr. Wean as tolerated    - Rhythm: NSR    - Beta Blocker: will start when appropriate    - Statin: high intensity statin, atorvastatin 40mg      Pulmonary:     - Oxygenating well on 2L NC    - Goal SpO2 >92%    - Chest Tubes x2 (Meds)    - ABGS PRN      Renal:    - Trend BUN/Cr     - Maintain Bautsita, record strict Is/Os    Recent Labs   Lab 12/12/24  1352 12/14/24  0403   BUN 20 16   CREATININE 1.5* 1.1           FEN / GI:     - Daily CMP, PRN K/Mag/Phos per protocol     - Replace electrolytes as needed    - Nutrition: Advance diet as tolerated    - Bowel Regimen: Miralax, docusate      ID:     - Aebrile    - Abx: Complete perioperative cefazolin 2g Q8H x 5 doses    Recent Labs   Lab 12/12/24  1352 12/13/24  1319 12/14/24  0403   WBC 7.92 16.98* 19.60*           Heme/Onc:     - Hgb 16.7 pre-operatively    - CBC daily    - ASA 325mg    Recent Labs   Lab 12/12/24  1352 12/13/24  1319 12/14/24  0403   HGB 16.7 12.2* 13.0*    145* 142*   APTT 27.8 29.3 29.0   INR 1.0  --  1.0           Endocrine:     - CTS Goal -140    - HgbA1c: 6.1    - Endocrinology consulted for insulin management      PPx:   Feeding: Advance diet as tolerated  Analgesia/Sedation: Scheduled tylenol, prn oxy  Thromboembolic Prevention: None  HOB >30: Yes  Stress Ulcer: Yes - famotidine 20mg IV BID  Glucose Control: Yes, insulin management per Endocrinology     Lines/Drains/Airway:   Art Line:L radial   Central Line: R IJ   Bautista    Chest Tubes: x2 (Mediastinal)    Pacing Wires: Temporary V pacing wires      Dispo/Code Status/Palliative:     - Continue SICU Care    - Full Code

## 2024-12-15 LAB
ALBUMIN SERPL BCP-MCNC: 3 G/DL (ref 3.5–5.2)
ALP SERPL-CCNC: 45 U/L (ref 40–150)
ALT SERPL W/O P-5'-P-CCNC: 7 U/L (ref 10–44)
ANION GAP SERPL CALC-SCNC: 9 MMOL/L (ref 8–16)
APTT PPP: 33 SEC (ref 21–32)
AST SERPL-CCNC: 39 U/L (ref 10–40)
BASOPHILS # BLD AUTO: 0.04 K/UL (ref 0–0.2)
BASOPHILS NFR BLD: 0.3 % (ref 0–1.9)
BILIRUB SERPL-MCNC: 0.8 MG/DL (ref 0.1–1)
BUN SERPL-MCNC: 22 MG/DL (ref 8–23)
CALCIUM SERPL-MCNC: 8.3 MG/DL (ref 8.7–10.5)
CHLORIDE SERPL-SCNC: 104 MMOL/L (ref 95–110)
CO2 SERPL-SCNC: 22 MMOL/L (ref 23–29)
CREAT SERPL-MCNC: 1.3 MG/DL (ref 0.5–1.4)
DIFFERENTIAL METHOD BLD: ABNORMAL
EOSINOPHIL # BLD AUTO: 0.1 K/UL (ref 0–0.5)
EOSINOPHIL NFR BLD: 0.5 % (ref 0–8)
ERYTHROCYTE [DISTWIDTH] IN BLOOD BY AUTOMATED COUNT: 13.1 % (ref 11.5–14.5)
EST. GFR  (NO RACE VARIABLE): 55.5 ML/MIN/1.73 M^2
GLUCOSE SERPL-MCNC: 116 MG/DL (ref 70–110)
HCT VFR BLD AUTO: 35.7 % (ref 40–54)
HGB BLD-MCNC: 11.8 G/DL (ref 14–18)
IMM GRANULOCYTES # BLD AUTO: 0.08 K/UL (ref 0–0.04)
IMM GRANULOCYTES NFR BLD AUTO: 0.6 % (ref 0–0.5)
INR PPP: 1 (ref 0.8–1.2)
LYMPHOCYTES # BLD AUTO: 0.6 K/UL (ref 1–4.8)
LYMPHOCYTES NFR BLD: 4.8 % (ref 18–48)
MAGNESIUM SERPL-MCNC: 2.5 MG/DL (ref 1.6–2.6)
MCH RBC QN AUTO: 32.3 PG (ref 27–31)
MCHC RBC AUTO-ENTMCNC: 33.1 G/DL (ref 32–36)
MCV RBC AUTO: 98 FL (ref 82–98)
MONOCYTES # BLD AUTO: 1.3 K/UL (ref 0.3–1)
MONOCYTES NFR BLD: 10.4 % (ref 4–15)
NEUTROPHILS # BLD AUTO: 10.5 K/UL (ref 1.8–7.7)
NEUTROPHILS NFR BLD: 83.4 % (ref 38–73)
NRBC BLD-RTO: 0 /100 WBC
PHOSPHATE SERPL-MCNC: 3.2 MG/DL (ref 2.7–4.5)
PLATELET # BLD AUTO: 106 K/UL (ref 150–450)
PMV BLD AUTO: 10.8 FL (ref 9.2–12.9)
POCT GLUCOSE: 131 MG/DL (ref 70–110)
POCT GLUCOSE: 132 MG/DL (ref 70–110)
POCT GLUCOSE: 144 MG/DL (ref 70–110)
POCT GLUCOSE: 159 MG/DL (ref 70–110)
POCT GLUCOSE: 164 MG/DL (ref 70–110)
POCT GLUCOSE: 179 MG/DL (ref 70–110)
POTASSIUM SERPL-SCNC: 4.1 MMOL/L (ref 3.5–5.1)
POTASSIUM SERPL-SCNC: 4.4 MMOL/L (ref 3.5–5.1)
PROT SERPL-MCNC: 5.6 G/DL (ref 6–8.4)
PROTHROMBIN TIME: 11.2 SEC (ref 9–12.5)
RBC # BLD AUTO: 3.65 M/UL (ref 4.6–6.2)
SODIUM SERPL-SCNC: 135 MMOL/L (ref 136–145)
WBC # BLD AUTO: 12.61 K/UL (ref 3.9–12.7)

## 2024-12-15 PROCEDURE — 25000003 PHARM REV CODE 250: Performed by: STUDENT IN AN ORGANIZED HEALTH CARE EDUCATION/TRAINING PROGRAM

## 2024-12-15 PROCEDURE — 25000003 PHARM REV CODE 250

## 2024-12-15 PROCEDURE — 63600175 PHARM REV CODE 636 W HCPCS

## 2024-12-15 PROCEDURE — 84132 ASSAY OF SERUM POTASSIUM: CPT | Mod: 91

## 2024-12-15 PROCEDURE — 83735 ASSAY OF MAGNESIUM: CPT

## 2024-12-15 PROCEDURE — 27000221 HC OXYGEN, UP TO 24 HOURS

## 2024-12-15 PROCEDURE — 99291 CRITICAL CARE FIRST HOUR: CPT | Mod: ,,, | Performed by: STUDENT IN AN ORGANIZED HEALTH CARE EDUCATION/TRAINING PROGRAM

## 2024-12-15 PROCEDURE — 20600001 HC STEP DOWN PRIVATE ROOM

## 2024-12-15 PROCEDURE — 85610 PROTHROMBIN TIME: CPT

## 2024-12-15 PROCEDURE — 80053 COMPREHEN METABOLIC PANEL: CPT | Performed by: STUDENT IN AN ORGANIZED HEALTH CARE EDUCATION/TRAINING PROGRAM

## 2024-12-15 PROCEDURE — 99232 SBSQ HOSP IP/OBS MODERATE 35: CPT | Mod: ,,, | Performed by: NURSE PRACTITIONER

## 2024-12-15 PROCEDURE — 84100 ASSAY OF PHOSPHORUS: CPT

## 2024-12-15 PROCEDURE — 36415 COLL VENOUS BLD VENIPUNCTURE: CPT

## 2024-12-15 PROCEDURE — 94761 N-INVAS EAR/PLS OXIMETRY MLT: CPT

## 2024-12-15 PROCEDURE — 27200667 HC PACEMAKER, TEMPORARY MONITORING, PER SHIFT

## 2024-12-15 PROCEDURE — 25000003 PHARM REV CODE 250: Performed by: THORACIC SURGERY (CARDIOTHORACIC VASCULAR SURGERY)

## 2024-12-15 PROCEDURE — 99900035 HC TECH TIME PER 15 MIN (STAT)

## 2024-12-15 PROCEDURE — 85730 THROMBOPLASTIN TIME PARTIAL: CPT

## 2024-12-15 PROCEDURE — 85025 COMPLETE CBC W/AUTO DIFF WBC: CPT

## 2024-12-15 RX ORDER — NIFEDIPINE 30 MG/1
30 TABLET, EXTENDED RELEASE ORAL DAILY
Status: DISCONTINUED | OUTPATIENT
Start: 2024-12-15 | End: 2024-12-15

## 2024-12-15 RX ORDER — AMOXICILLIN 250 MG
1 CAPSULE ORAL DAILY
Status: DISCONTINUED | OUTPATIENT
Start: 2024-12-15 | End: 2024-12-21 | Stop reason: HOSPADM

## 2024-12-15 RX ORDER — TALC
6 POWDER (GRAM) TOPICAL NIGHTLY
Status: DISCONTINUED | OUTPATIENT
Start: 2024-12-15 | End: 2024-12-21 | Stop reason: HOSPADM

## 2024-12-15 RX ORDER — TAMSULOSIN HYDROCHLORIDE 0.4 MG/1
0.4 CAPSULE ORAL DAILY
Status: DISCONTINUED | OUTPATIENT
Start: 2024-12-15 | End: 2024-12-21 | Stop reason: HOSPADM

## 2024-12-15 RX ORDER — POLYETHYLENE GLYCOL 3350 17 G/17G
17 POWDER, FOR SOLUTION ORAL 2 TIMES DAILY
Status: DISCONTINUED | OUTPATIENT
Start: 2024-12-15 | End: 2024-12-21 | Stop reason: HOSPADM

## 2024-12-15 RX ORDER — NIFEDIPINE 60 MG/1
60 TABLET, EXTENDED RELEASE ORAL DAILY
Status: DISCONTINUED | OUTPATIENT
Start: 2024-12-16 | End: 2024-12-17

## 2024-12-15 RX ORDER — FUROSEMIDE 10 MG/ML
20 INJECTION INTRAMUSCULAR; INTRAVENOUS ONCE
Status: COMPLETED | OUTPATIENT
Start: 2024-12-15 | End: 2024-12-15

## 2024-12-15 RX ORDER — NIFEDIPINE 30 MG/1
30 TABLET, EXTENDED RELEASE ORAL ONCE
Status: COMPLETED | OUTPATIENT
Start: 2024-12-15 | End: 2024-12-15

## 2024-12-15 RX ORDER — FUROSEMIDE 20 MG/1
20 TABLET ORAL DAILY
Status: DISCONTINUED | OUTPATIENT
Start: 2024-12-15 | End: 2024-12-16

## 2024-12-15 RX ORDER — METOPROLOL TARTRATE 25 MG/1
12.5 TABLET ORAL 2 TIMES DAILY
Status: DISCONTINUED | OUTPATIENT
Start: 2024-12-15 | End: 2024-12-17

## 2024-12-15 RX ADMIN — DOCUSATE SODIUM 100 MG: 100 CAPSULE, LIQUID FILLED ORAL at 08:12

## 2024-12-15 RX ADMIN — MUPIROCIN 1 G: 20 OINTMENT TOPICAL at 08:12

## 2024-12-15 RX ADMIN — NIFEDIPINE 30 MG: 30 TABLET, FILM COATED, EXTENDED RELEASE ORAL at 08:12

## 2024-12-15 RX ADMIN — FUROSEMIDE 20 MG: 20 TABLET ORAL at 02:12

## 2024-12-15 RX ADMIN — OXYCODONE 5 MG: 5 TABLET ORAL at 10:12

## 2024-12-15 RX ADMIN — ACETAMINOPHEN 650 MG: 325 TABLET ORAL at 05:12

## 2024-12-15 RX ADMIN — ASPIRIN 325 MG ORAL TABLET 325 MG: 325 PILL ORAL at 08:12

## 2024-12-15 RX ADMIN — METOPROLOL TARTRATE 12.5 MG: 25 TABLET, FILM COATED ORAL at 08:12

## 2024-12-15 RX ADMIN — HEPARIN SODIUM 7500 UNITS: 5000 INJECTION INTRAVENOUS; SUBCUTANEOUS at 02:12

## 2024-12-15 RX ADMIN — MELATONIN TAB 3 MG 6 MG: 3 TAB at 08:12

## 2024-12-15 RX ADMIN — ATORVASTATIN CALCIUM 40 MG: 40 TABLET, FILM COATED ORAL at 08:12

## 2024-12-15 RX ADMIN — NIFEDIPINE 30 MG: 30 TABLET, FILM COATED, EXTENDED RELEASE ORAL at 01:12

## 2024-12-15 RX ADMIN — INSULIN ASPART 1 UNITS: 100 INJECTION, SOLUTION INTRAVENOUS; SUBCUTANEOUS at 01:12

## 2024-12-15 RX ADMIN — HEPARIN SODIUM 7500 UNITS: 5000 INJECTION INTRAVENOUS; SUBCUTANEOUS at 09:12

## 2024-12-15 RX ADMIN — SENNOSIDES AND DOCUSATE SODIUM 1 TABLET: 50; 8.6 TABLET ORAL at 08:12

## 2024-12-15 RX ADMIN — OXYCODONE 5 MG: 5 TABLET ORAL at 09:12

## 2024-12-15 RX ADMIN — POLYETHYLENE GLYCOL 3350 17 G: 17 POWDER, FOR SOLUTION ORAL at 10:12

## 2024-12-15 RX ADMIN — FAMOTIDINE 20 MG: 10 INJECTION, SOLUTION INTRAVENOUS at 10:12

## 2024-12-15 RX ADMIN — OXYCODONE 5 MG: 5 TABLET ORAL at 05:12

## 2024-12-15 RX ADMIN — FUROSEMIDE 20 MG: 10 INJECTION, SOLUTION INTRAMUSCULAR; INTRAVENOUS at 05:12

## 2024-12-15 RX ADMIN — TAMSULOSIN HYDROCHLORIDE 0.4 MG: 0.4 CAPSULE ORAL at 10:12

## 2024-12-15 RX ADMIN — ACETAMINOPHEN 650 MG: 325 TABLET ORAL at 02:12

## 2024-12-15 RX ADMIN — OXYCODONE 5 MG: 5 TABLET ORAL at 01:12

## 2024-12-15 RX ADMIN — INSULIN ASPART 1 UNITS: 100 INJECTION, SOLUTION INTRAVENOUS; SUBCUTANEOUS at 05:12

## 2024-12-15 RX ADMIN — ACETAMINOPHEN 650 MG: 325 TABLET ORAL at 09:12

## 2024-12-15 RX ADMIN — LEVOTHYROXINE SODIUM 75 MCG: 25 TABLET ORAL at 05:12

## 2024-12-15 RX ADMIN — HEPARIN SODIUM 7500 UNITS: 5000 INJECTION INTRAVENOUS; SUBCUTANEOUS at 05:12

## 2024-12-15 NOTE — SUBJECTIVE & OBJECTIVE
"Interval HPI:   Overnight events: No acute events overnight. Patient in room 70068/49317 A. Blood glucose stable. BG at goal on current insulin regimen (Transition Insulin Drip). Steroid use- None. 2 Days Post-Op  Renal function- Normal   Vasopressors-  None       Endocrine will continue to follow and manage insulin orders inpatient.       Diet Full Liquid     Eatin%  Nausea: No  Hypoglycemia and intervention: No  Fever: No  TPN and/or TF: No    /69   Pulse 91   Temp 98.1 °F (36.7 °C) (Oral)   Resp (!) 28   Ht 5' 7" (1.702 m)   Wt 85.6 kg (188 lb 11.4 oz)   SpO2 97%   BMI 29.56 kg/m²     Labs Reviewed and Include    Recent Labs   Lab 12/15/24  0430   *   CALCIUM 8.3*   ALBUMIN 3.0*   PROT 5.6*   *   K 4.1   CO2 22*      BUN 22   CREATININE 1.3   ALKPHOS 45   ALT 7*   AST 39   BILITOT 0.8     Lab Results   Component Value Date    WBC 12.61 12/15/2024    HGB 11.8 (L) 12/15/2024    HCT 35.7 (L) 12/15/2024    MCV 98 12/15/2024     (L) 12/15/2024     No results for input(s): "TSH", "FREET4" in the last 168 hours.  Lab Results   Component Value Date    HGBA1C 6.1 (H) 2024       Nutritional status:   Body mass index is 29.56 kg/m².  Lab Results   Component Value Date    ALBUMIN 3.0 (L) 12/15/2024    ALBUMIN 3.4 (L) 2024    ALBUMIN 3.9 2024     No results found for: "PREALBUMIN"    Estimated Creatinine Clearance: 47.4 mL/min (based on SCr of 1.3 mg/dL).    Accu-Checks  Recent Labs     24  1303 24  1413 24  1501 24  1658 24  1757 24  1926 24  2031 12/15/24  0016 12/15/24  0429 12/15/24  0821   POCTGLUCOSE 133* 155* 167* 166* 161* 143* 155* 144* 131* 132*       Current Medications and/or Treatments Impacting Glycemic Control  Immunotherapy:    Immunosuppressants       None          Steroids:   Hormones (From admission, onward)      Start     Stop Route Frequency Ordered    12/15/24 2100  melatonin tablet 6 mg         -- " Oral Nightly 12/15/24 0632          Pressors:    Autonomic Drugs (From admission, onward)      Start     Stop Route Frequency Ordered    12/15/24 0730  metoprolol tartrate (LOPRESSOR) split tablet 12.5 mg         -- Oral 2 times daily 12/15/24 0715          Hyperglycemia/Diabetes Medications:   Antihyperglycemics (From admission, onward)      Start     Stop Route Frequency Ordered    12/14/24 1245  insulin aspart U-100 pen 0-5 Units         -- SubQ Every 4 hours PRN 12/14/24 1145    12/14/24 1245  insulin regular in 0.9 % NaCl 100 unit/100 mL (1 unit/mL) infusion        Question:  Enter initial dose (Units/hr):  Answer:  1    -- IV Continuous 12/14/24 1147

## 2024-12-15 NOTE — PROGRESS NOTES
"[facility] - [facility]  Endocrinology  Progress Note    Admit Date: 2024     Reason for Consult: Management of prediabetic, Hyperglycemia     Surgical Procedure and Date: s/p MVR on 2024    Patient is not diabetic and does not currently take any oral/injectable antidiabetic/hypoglycemic medications.         HPI: Wiliam Arreola is a 79 y.o. male with a PMHx of PE, DVT, HTN, severe MR, HLD, who presents to the SICU s/p mitral valve repair with Dr. Gates. On admission, he is intubated, sedated with propofol, and in stable condition. Endocrine consulted to manage hyperglycemia and pre-diabetes in the context of CTS.     Lab Results   Component Value Date    HGBA1C 6.1 (H) 2024           Interval HPI:   Overnight events: No acute events overnight. Patient in room 00768/13179 A. Blood glucose stable. BG at goal on current insulin regimen (Transition Insulin Drip). Steroid use- None. 2 Days Post-Op  Renal function- Normal   Vasopressors-  None       Endocrine will continue to follow and manage insulin orders inpatient.       Diet Full Liquid     Eatin%  Nausea: No  Hypoglycemia and intervention: No  Fever: No  TPN and/or TF: No    /69   Pulse 91   Temp 98.1 °F (36.7 °C) (Oral)   Resp (!) 28   Ht 5' 7" (1.702 m)   Wt 85.6 kg (188 lb 11.4 oz)   SpO2 97%   BMI 29.56 kg/m²     Labs Reviewed and Include    Recent Labs   Lab 12/15/24  0430   *   CALCIUM 8.3*   ALBUMIN 3.0*   PROT 5.6*   *   K 4.1   CO2 22*      BUN 22   CREATININE 1.3   ALKPHOS 45   ALT 7*   AST 39   BILITOT 0.8     Lab Results   Component Value Date    WBC 12.61 12/15/2024    HGB 11.8 (L) 12/15/2024    HCT 35.7 (L) 12/15/2024    MCV 98 12/15/2024     (L) 12/15/2024     No results for input(s): "TSH", "FREET4" in the last 168 hours.  Lab Results   Component Value Date    HGBA1C 6.1 (H) 2024       Nutritional status:   Body mass index is 29.56 kg/m².  Lab Results   Component " "Value Date    ALBUMIN 3.0 (L) 12/15/2024    ALBUMIN 3.4 (L) 12/14/2024    ALBUMIN 3.9 12/12/2024     No results found for: "PREALBUMIN"    Estimated Creatinine Clearance: 47.4 mL/min (based on SCr of 1.3 mg/dL).    Accu-Checks  Recent Labs     12/14/24  1303 12/14/24  1413 12/14/24  1501 12/14/24  1658 12/14/24  1757 12/14/24  1926 12/14/24  2031 12/15/24  0016 12/15/24  0429 12/15/24  0821   POCTGLUCOSE 133* 155* 167* 166* 161* 143* 155* 144* 131* 132*       Current Medications and/or Treatments Impacting Glycemic Control  Immunotherapy:    Immunosuppressants       None          Steroids:   Hormones (From admission, onward)      Start     Stop Route Frequency Ordered    12/15/24 2100  melatonin tablet 6 mg         -- Oral Nightly 12/15/24 0632          Pressors:    Autonomic Drugs (From admission, onward)      Start     Stop Route Frequency Ordered    12/15/24 0730  metoprolol tartrate (LOPRESSOR) split tablet 12.5 mg         -- Oral 2 times daily 12/15/24 0715          Hyperglycemia/Diabetes Medications:   Antihyperglycemics (From admission, onward)      Start     Stop Route Frequency Ordered    12/14/24 1245  insulin aspart U-100 pen 0-5 Units         -- SubQ Every 4 hours PRN 12/14/24 1145    12/14/24 1245  insulin regular in 0.9 % NaCl 100 unit/100 mL (1 unit/mL) infusion        Question:  Enter initial dose (Units/hr):  Answer:  1    -- IV Continuous 12/14/24 1145            ASSESSMENT and PLAN    Cardiac/Vascular  * Rupture of chordae tendineae  S/p MVR  Optimize BG control to improve wound healing        Mitral regurgitation due to cusp prolapse  Managed per primary team  Avoid hypoglycemia        Endocrine  Prediabetes  Endocrinology consulted for BG management.   BG goal 110-140 CTS    - Transition drip at 1.0 units/hr with step-down parameters. (Based on needs while on IIP)  - Novolog (aspart) insulin prn for BG excursions LDC SSI (150/50).  - BG checks q4hr  - Hypoglycemia protocol in place    ** Please " notify Endocrine for any change and/or advance in diet**  ** Please call Endocrine for any BG related issues **    Discharge Planning:   TBD. Please notify endocrinology prior to discharge.             Arie Villalobos, DNP, FNP  Endocrinology  [facility] - Surgical Intensive Care

## 2024-12-15 NOTE — ASSESSMENT & PLAN NOTE
Endocrinology consulted for BG management.   BG goal 110-140 CTS    - Transition drip at 1.0 units/hr with step-down parameters. (Based on needs while on IIP)  - Novolog (aspart) insulin prn for BG excursions LDC SSI (150/50).  - BG checks q4hr  - Hypoglycemia protocol in place    ** Please notify Endocrine for any change and/or advance in diet**  ** Please call Endocrine for any BG related issues **    Discharge Planning:   TBD. Please notify endocrinology prior to discharge.

## 2024-12-15 NOTE — PROGRESS NOTES
[facility] - [facility]  Critical Care - Surgery  Progress Note    Patient Name: Wiliam Arreola  MRN: [id number]  Admission Date: 12/13/2024  Hospital Length of Stay: 2 days  Code Status: Prior  Attending Provider: Babatunde Gates MD  Primary Care Provider: Silvio Aranda, NP-C   Principal Problem: Rupture of chordae tendineae    Subjective:     Hospital/ICU Course:  No notes on file    Interval History/Significant Events: D/C clevi. Started BB.  . Total of 90 mg nifedipine given from overnight to morning. Pending step down.     Follow-up For: Procedure(s) (LRB):  REPAIR, MITRAL VALVE, OPEN (N/A)    Post-Operative Day: 2 Days Post-Op    Objective:     Vital Signs (Most Recent):  Temp: 97.7 °F (36.5 °C) (12/15/24 1100)  Pulse: 74 (12/15/24 1100)  Resp: (!) 23 (12/15/24 1100)  BP: 122/73 (12/15/24 1100)  SpO2: 97 % (12/15/24 1100) Vital Signs (24h Range):  Temp:  [97.7 °F (36.5 °C)-98.1 °F (36.7 °C)] 97.7 °F (36.5 °C)  Pulse:  [65-92] 74  Resp:  [13-45] 23  SpO2:  [89 %-100 %] 97 %  BP: (102-179)/(57-95) 122/73     Weight: 85.6 kg (188 lb 11.4 oz)  Body mass index is 29.56 kg/m².      Intake/Output Summary (Last 24 hours) at 12/15/2024 1123  Last data filed at 12/15/2024 1100  Gross per 24 hour   Intake 159.16 ml   Output 972 ml   Net -812.84 ml          Physical Exam  Vitals and nursing note reviewed.   Constitutional:       General: He is not in acute distress.     Appearance: Normal appearance. He is normal weight.   HENT:      Head: Normocephalic.      Nose: Nose normal.      Mouth/Throat:      Mouth: Mucous membranes are dry.   Eyes:      Conjunctiva/sclera: Conjunctivae normal.      Pupils: Pupils are equal, round, and reactive to light.   Cardiovascular:      Rate and Rhythm: Normal rate and regular rhythm.      Pulses: Normal pulses.   Pulmonary:      Effort: Pulmonary effort is normal. No respiratory distress.      Breath sounds: Normal breath sounds.   Abdominal:      Palpations:  Abdomen is soft.   Musculoskeletal:      Right lower leg: No edema.      Left lower leg: No edema.      Comments: R IJ central line, mediastinal chest tubes x2   Skin:     General: Skin is warm and dry.      Comments: Chest incision dressing clean and intact   Neurological:      General: No focal deficit present.      Mental Status: He is alert and oriented to person, place, and time. Mental status is at baseline.            Vents:  Vent Mode: A/C (12/13/24 1528)  Ventilator Initiated: Yes (12/13/24 1310)  Set Rate: 20 BPM (12/13/24 1528)  Vt Set: 430 mL (12/13/24 1528)  PEEP/CPAP: 5 cmH20 (12/13/24 1528)  Oxygen Concentration (%): 30 (12/13/24 2011)  Peak Airway Pressure: 43 cmH20 (12/13/24 1528)  Plateau Pressure: 0 cmH20 (12/13/24 1528)  Total Ve: 8.48 L/m (12/13/24 1528)  Negative Inspiratory Force (cm H2O): -22 (12/13/24 1740)  F/VT Ratio<105 (RSBI): (!) 42.17 (12/13/24 1528)    Lines/Drains/Airways       Drain  Duration                  Urethral Catheter 12/13/24 0800 2 days         Y Chest Tube 1 and 2 12/13/24 1214 1 Anterior Mediastinal 19 Fr. 2 Mediastinal 19 Fr. 1 day              Line  Duration                  Pacer Wires 12/13/24 1214 1 day              Peripheral Intravenous Line  Duration                  Peripheral IV - Single Lumen 12/13/24 0735 14 G  Left Forearm 2 days         Peripheral IV - Single Lumen 12/14/24 1930 20 G Anterior;Distal;Left Upper Arm <1 day                    Significant Labs:    CBC/Anemia Profile:  Recent Labs   Lab 12/13/24  1319 12/13/24  1534 12/14/24  0009 12/14/24  0403 12/15/24  0430   WBC 16.98*  --   --  19.60* 12.61   HGB 12.2*  --   --  13.0* 11.8*   HCT 34.8*   < > 37 36.2* 35.7*   *  --   --  142* 106*   MCV 95  --   --  95 98   RDW 12.5  --   --  12.9 13.1    < > = values in this interval not displayed.        Chemistries:  Recent Labs   Lab 12/14/24  0403 12/14/24  0805 12/14/24  2032 12/15/24  0430 12/15/24  0828     --   --  135*  --    K 4.0    < > 4.6 4.1 4.4     --   --  104  --    CO2 20*  --   --  22*  --    BUN 16  --   --  22  --    CREATININE 1.1  --   --  1.3  --    CALCIUM 8.5*  --   --  8.3*  --    ALBUMIN 3.4*  --   --  3.0*  --    PROT 5.4*  --   --  5.6*  --    BILITOT 0.6  --   --  0.8  --    ALKPHOS 38*  --   --  45  --    ALT 11  --   --  7*  --    AST 52*  --   --  39  --    MG 2.1  --   --  2.5  --    PHOS 3.1  --   --  3.2  --     < > = values in this interval not displayed.       CMP:   Recent Labs   Lab 12/14/24  0403 12/14/24  0805 12/14/24  2032 12/15/24  0430 12/15/24  0828     --   --  135*  --    K 4.0   < > 4.6 4.1 4.4     --   --  104  --    CO2 20*  --   --  22*  --    *  --   --  116*  --    BUN 16  --   --  22  --    CREATININE 1.1  --   --  1.3  --    CALCIUM 8.5*  --   --  8.3*  --    PROT 5.4*  --   --  5.6*  --    ALBUMIN 3.4*  --   --  3.0*  --    BILITOT 0.6  --   --  0.8  --    ALKPHOS 38*  --   --  45  --    AST 52*  --   --  39  --    ALT 11  --   --  7*  --    ANIONGAP 9  --   --  9  --     < > = values in this interval not displayed.       Significant Imaging:  I have reviewed all pertinent imaging results/findings within the past 24 hours.  Assessment/Plan:     Cardiac/Vascular  Mitral regurgitation due to cusp prolapse    Neuro/Psych:     - Sedation: None    - Pain:     - Scheduled Tylenol 1000mg Q8H   - PRN Oxycodone 5mg             Cardiac:     - S/p mitral valve repair with Dr. Gates    - BP Goal: MAP 60-80    - Inotropes/Pressors: Epinephrine weaned off    - Anti-HTNs: Nifedipine 60 mg     - Rhythm: NSR    - Beta Blocker: Metoprolol 12.5 mg BID    - Statin: high intensity statin, atorvastatin 40mg      Pulmonary:     - Oxygenating well on 2L NC    - Goal SpO2 >92%    - Chest Tubes x2 (Meds)    - ABGS PRN      Renal:    - Trend BUN/Cr     - Maintain Bautista, record strict Is/Os  - Flomax 0.4    Recent Labs   Lab 12/12/24  1352 12/14/24  0403 12/15/24  0430   BUN 20 16 22   CREATININE  1.5* 1.1 1.3           FEN / GI:     - Daily CMP, PRN K/Mag/Phos per protocol     - Replace electrolytes as needed    - Nutrition: Advance diet as tolerated    - Bowel Regimen: Miralax, docusate      ID:     - Aebrile    - Abx: Complete perioperative cefazolin 2g Q8H x 5 doses    Recent Labs   Lab 12/13/24  1319 12/14/24  0403 12/15/24  0430   WBC 16.98* 19.60* 12.61           Heme/Onc:     - Hgb 16.7 pre-operatively    - CBC daily    - ASA 325mg    Recent Labs   Lab 12/12/24  1352 12/13/24  1319 12/14/24  0403 12/15/24  0430   HGB 16.7 12.2* 13.0* 11.8*    145* 142* 106*   APTT 27.8 29.3 29.0 33.0*   INR 1.0  --  1.0 1.0           Endocrine:     - CTS Goal -140    - HgbA1c: 6.1    - Endocrinology consulted for insulin management      PPx:   Feeding: Advance diet as tolerated  Analgesia/Sedation: Scheduled tylenol, prn oxy  Thromboembolic Prevention: None  HOB >30: Yes  Stress Ulcer: Yes - famotidine 20mg IV BID  Glucose Control: Yes, insulin management per Endocrinology     Lines/Drains/Airway:      Bautista    Chest Tubes: x2 (Mediastinal)    Pacing Wires: Temporary V pacing wires      Dispo/Code Status/Palliative:     - Continue SICU Care    - Full Code               Critical care was time spent personally by me on the following activities: development of treatment plan with patient or surrogate and bedside caregivers, discussions with consultants, evaluation of patient's response to treatment, examination of patient, ordering and performing treatments and interventions, ordering and review of laboratory studies, ordering and review of radiographic studies, pulse oximetry, re-evaluation of patient's condition.  This critical care time did not overlap with that of any other provider or involve time for any procedures.     [place],   Critical Care - Surgery  [facility]

## 2024-12-15 NOTE — HOSPITAL COURSE
Pt underwent MV repair on 12/13. Required to be paced until 12/14. Transitioned from cleviprex gtt to nifedipine and BB.       MOBILITY AND ACTIVITY: As tolerated. Patient may shower. No heavy lifting of greater than 5 pounds and no driving.     DIET: An 1800-calorie ADA with a 1500 mL fluid restriction.     WOUND CARE INSTRUCTIONS: Check for redness, swelling and drainage around the  incision or wound. Patient is to call for any obvious bleeding, drainage, pus from the wound, unusual problems or difficulties or temperature of greater than 101   degrees.     FOLLOWUP: Follow up with .   in approximately 3 weeks. Prior to this  appointment, the patient will have a chest x-ray and EKG.     Patient not placed on [employer] at the time of discharge due to potential for hypotension

## 2024-12-15 NOTE — PLAN OF CARE
SICU PLAN OF CARE    Dx: Rupture of chordae tendineae    Goals of Care: MAP 75-95    Vital Signs (last 12 hours):   Temp:  [97.7 °F (36.5 °C)-98.1 °F (36.7 °C)]   Pulse:  [66-92]   Resp:  [13-45]   BP: ()/(57-95)   SpO2:  [89 %-100 %]      Neuro: AAOx4, Follows commands , and Moves all extremities spontaneously     Cardiac: NSR and VVI backup @ 50    Respiratory: Room Air    Gtts: Insulin    Urine Output: Urethral Catheter  385 mL/shift    Drains: Chest Tube, total output 50mL/shift    Diet: Cardiac      Labs/Accuchecks: Q4H Accuchecks, Daily labs    Skin:  No skin breakdown noted this shift. Dressing changes performed for chest tubes, midsternal incision, and peripheral IV. Cleaned site with sterile NS. Patient turned q2h, bony prominences protected, and waffle mattress inflated/working correctly.     Shift Events:  d/c Clevi for oral nifedipine and metoprolol. Oxy x1 for pain. OOBTC with meals, diet advanced from clears to cardiac. 20mg of lasix x2 for UOP of 15-20 cc/hour. Transfer orders placed.

## 2024-12-15 NOTE — PLAN OF CARE
[facility]    CVICU Staff Addendum  I have reviewed and concur with the resident's history, physical, assessment, and plan.  I have personally interviewed and examined the patient at bedside.  See below for any additional findings.    Reason for admission:  Rupture of chordae tendineae  Present on Admission:   Rupture of chordae tendineae   Mitral regurgitation due to cusp prolapse      Goals for Today:   - Continues to progress appropriately. Sinus rhythm with PVCs and PACs @79-80/min. Placed on backup pacing @50 VVI. On clevidipine. Was given nifedipine for MAP control.   - multimodal analgesia  - nifedipine 60mg XL/day, metoprolol 12.5mg BID started today  - OOB/IS/PT/OT  - Nebs prn given h/o smoking with chest PT  - cardiac diet with fluid restriction and supplements  - I/Os  - No lasix at this time, can consider 20mg bolus if O2 requirements go up  - , SQH  - SCDs  - appropriate for transfer to stepdown unit today    35 minutes of critical care time was spent personally by me on the following activities: development of treatment plan with patient or surrogate and bedside caregivers, discussions with consultants, evaluation of patient's response to treatment, examination of patient, ordering and performing treatments and interventions, ordering and review of laboratory studies, ordering and review of radiographic studies, pulse oximetry, re-evaluation of patient's condition.  This critical care time did not overlap with that of any other provider or involve time for any procedures.    Rahul Blancas MD  Anesthesiology and Critical Care Medicine  [facility], [address]

## 2024-12-15 NOTE — SUBJECTIVE & OBJECTIVE
Interval History/Significant Events: D/C clevi. Started BB.  . Total of 90 mg nifedipine given from overnight to morning. Pending step down.     Follow-up For: Procedure(s) (LRB):  REPAIR, MITRAL VALVE, OPEN (N/A)    Post-Operative Day: 2 Days Post-Op    Objective:     Vital Signs (Most Recent):  Temp: 97.7 °F (36.5 °C) (12/15/24 1100)  Pulse: 74 (12/15/24 1100)  Resp: (!) 23 (12/15/24 1100)  BP: 122/73 (12/15/24 1100)  SpO2: 97 % (12/15/24 1100) Vital Signs (24h Range):  Temp:  [97.7 °F (36.5 °C)-98.1 °F (36.7 °C)] 97.7 °F (36.5 °C)  Pulse:  [65-92] 74  Resp:  [13-45] 23  SpO2:  [89 %-100 %] 97 %  BP: (102-179)/(57-95) 122/73     Weight: 85.6 kg (188 lb 11.4 oz)  Body mass index is 29.56 kg/m².      Intake/Output Summary (Last 24 hours) at 12/15/2024 1123  Last data filed at 12/15/2024 1100  Gross per 24 hour   Intake 159.16 ml   Output 972 ml   Net -812.84 ml          Physical Exam  Vitals and nursing note reviewed.   Constitutional:       General: He is not in acute distress.     Appearance: Normal appearance. He is normal weight.   HENT:      Head: Normocephalic.      Nose: Nose normal.      Mouth/Throat:      Mouth: Mucous membranes are dry.   Eyes:      Conjunctiva/sclera: Conjunctivae normal.      Pupils: Pupils are equal, round, and reactive to light.   Cardiovascular:      Rate and Rhythm: Normal rate and regular rhythm.      Pulses: Normal pulses.   Pulmonary:      Effort: Pulmonary effort is normal. No respiratory distress.      Breath sounds: Normal breath sounds.   Abdominal:      Palpations: Abdomen is soft.   Musculoskeletal:      Right lower leg: No edema.      Left lower leg: No edema.      Comments: R IJ central line, mediastinal chest tubes x2   Skin:     General: Skin is warm and dry.      Comments: Chest incision dressing clean and intact   Neurological:      General: No focal deficit present.      Mental Status: He is alert and oriented to person, place, and time. Mental status is at baseline.             Vents:  Vent Mode: A/C (12/13/24 1528)  Ventilator Initiated: Yes (12/13/24 1310)  Set Rate: 20 BPM (12/13/24 1528)  Vt Set: 430 mL (12/13/24 1528)  PEEP/CPAP: 5 cmH20 (12/13/24 1528)  Oxygen Concentration (%): 30 (12/13/24 2011)  Peak Airway Pressure: 43 cmH20 (12/13/24 1528)  Plateau Pressure: 0 cmH20 (12/13/24 1528)  Total Ve: 8.48 L/m (12/13/24 1528)  Negative Inspiratory Force (cm H2O): -22 (12/13/24 1740)  F/VT Ratio<105 (RSBI): (!) 42.17 (12/13/24 1528)    Lines/Drains/Airways       Drain  Duration                  Urethral Catheter 12/13/24 0800 2 days         Y Chest Tube 1 and 2 12/13/24 1214 1 Anterior Mediastinal 19 Fr. 2 Mediastinal 19 Fr. 1 day              Line  Duration                  Pacer Wires 12/13/24 1214 1 day              Peripheral Intravenous Line  Duration                  Peripheral IV - Single Lumen 12/13/24 0735 14 G  Left Forearm 2 days         Peripheral IV - Single Lumen 12/14/24 1930 20 G Anterior;Distal;Left Upper Arm <1 day                    Significant Labs:    CBC/Anemia Profile:  Recent Labs   Lab 12/13/24  1319 12/13/24  1534 12/14/24  0009 12/14/24  0403 12/15/24  0430   WBC 16.98*  --   --  19.60* 12.61   HGB 12.2*  --   --  13.0* 11.8*   HCT 34.8*   < > 37 36.2* 35.7*   *  --   --  142* 106*   MCV 95  --   --  95 98   RDW 12.5  --   --  12.9 13.1    < > = values in this interval not displayed.        Chemistries:  Recent Labs   Lab 12/14/24  0403 12/14/24  0805 12/14/24  2032 12/15/24  0430 12/15/24  0828     --   --  135*  --    K 4.0   < > 4.6 4.1 4.4     --   --  104  --    CO2 20*  --   --  22*  --    BUN 16  --   --  22  --    CREATININE 1.1  --   --  1.3  --    CALCIUM 8.5*  --   --  8.3*  --    ALBUMIN 3.4*  --   --  3.0*  --    PROT 5.4*  --   --  5.6*  --    BILITOT 0.6  --   --  0.8  --    ALKPHOS 38*  --   --  45  --    ALT 11  --   --  7*  --    AST 52*  --   --  39  --    MG 2.1  --   --  2.5  --    PHOS 3.1  --   --  3.2  --      < > = values in this interval not displayed.       CMP:   Recent Labs   Lab 12/14/24  0403 12/14/24  0805 12/14/24  2032 12/15/24  0430 12/15/24  0828     --   --  135*  --    K 4.0   < > 4.6 4.1 4.4     --   --  104  --    CO2 20*  --   --  22*  --    *  --   --  116*  --    BUN 16  --   --  22  --    CREATININE 1.1  --   --  1.3  --    CALCIUM 8.5*  --   --  8.3*  --    PROT 5.4*  --   --  5.6*  --    ALBUMIN 3.4*  --   --  3.0*  --    BILITOT 0.6  --   --  0.8  --    ALKPHOS 38*  --   --  45  --    AST 52*  --   --  39  --    ALT 11  --   --  7*  --    ANIONGAP 9  --   --  9  --     < > = values in this interval not displayed.       Significant Imaging:  I have reviewed all pertinent imaging results/findings within the past 24 hours.

## 2024-12-15 NOTE — ASSESSMENT & PLAN NOTE
Neuro/Psych:     - Sedation: None    - Pain:     - Scheduled Tylenol 1000mg Q8H   - PRN Oxycodone 5mg             Cardiac:     - S/p mitral valve repair with Dr. Gates    - BP Goal: MAP 60-80    - Inotropes/Pressors: Epinephrine weaned off    - Anti-HTNs: Nifedipine 60 mg     - Rhythm: NSR    - Beta Blocker: Metoprolol 12.5 mg BID    - Statin: high intensity statin, atorvastatin 40mg      Pulmonary:     - Oxygenating well on 2L NC    - Goal SpO2 >92%    - Chest Tubes x2 (Meds)    - ABGS PRN      Renal:    - Trend BUN/Cr     - Maintain Bautista, record strict Is/Os  - Flomax 0.4    Recent Labs   Lab 12/12/24  1352 12/14/24  0403 12/15/24  0430   BUN 20 16 22   CREATININE 1.5* 1.1 1.3           FEN / GI:     - Daily CMP, PRN K/Mag/Phos per protocol     - Replace electrolytes as needed    - Nutrition: Advance diet as tolerated    - Bowel Regimen: Miralax, docusate      ID:     - Aebrile    - Abx: Complete perioperative cefazolin 2g Q8H x 5 doses    Recent Labs   Lab 12/13/24  1319 12/14/24  0403 12/15/24  0430   WBC 16.98* 19.60* 12.61           Heme/Onc:     - Hgb 16.7 pre-operatively    - CBC daily    - ASA 325mg    Recent Labs   Lab 12/12/24  1352 12/13/24  1319 12/14/24  0403 12/15/24  0430   HGB 16.7 12.2* 13.0* 11.8*    145* 142* 106*   APTT 27.8 29.3 29.0 33.0*   INR 1.0  --  1.0 1.0           Endocrine:     - CTS Goal -140    - HgbA1c: 6.1    - Endocrinology consulted for insulin management      PPx:   Feeding: Advance diet as tolerated  Analgesia/Sedation: Scheduled tylenol, prn oxy  Thromboembolic Prevention: None  HOB >30: Yes  Stress Ulcer: Yes - famotidine 20mg IV BID  Glucose Control: Yes, insulin management per Endocrinology     Lines/Drains/Airway:      Bautista    Chest Tubes: x2 (Mediastinal)    Pacing Wires: Temporary V pacing wires      Dispo/Code Status/Palliative:     - Continue SICU Care    - Full Code

## 2024-12-16 PROBLEM — Z98.890 S/P MVR (MITRAL VALVE REPAIR): Status: ACTIVE | Noted: 2024-12-16

## 2024-12-16 PROBLEM — E83.39 HYPOPHOSPHATEMIA: Status: ACTIVE | Noted: 2024-12-16

## 2024-12-16 PROBLEM — D69.6 THROMBOCYTOPENIA: Status: ACTIVE | Noted: 2024-12-16

## 2024-12-16 PROBLEM — D62 ACUTE BLOOD LOSS ANEMIA: Status: ACTIVE | Noted: 2024-12-16

## 2024-12-16 LAB
ALBUMIN SERPL BCP-MCNC: 2.5 G/DL (ref 3.5–5.2)
ALP SERPL-CCNC: 53 U/L (ref 40–150)
ALT SERPL W/O P-5'-P-CCNC: 6 U/L (ref 10–44)
ANION GAP SERPL CALC-SCNC: 8 MMOL/L (ref 8–16)
APTT PPP: 41.1 SEC (ref 21–32)
AST SERPL-CCNC: 31 U/L (ref 10–40)
BASOPHILS # BLD AUTO: 0.03 K/UL (ref 0–0.2)
BASOPHILS NFR BLD: 0.3 % (ref 0–1.9)
BILIRUB SERPL-MCNC: 0.8 MG/DL (ref 0.1–1)
BLD PROD TYP BPU: NORMAL
BLD PROD TYP BPU: NORMAL
BLOOD UNIT EXPIRATION DATE: NORMAL
BLOOD UNIT EXPIRATION DATE: NORMAL
BLOOD UNIT TYPE CODE: 6200
BLOOD UNIT TYPE CODE: 6200
BLOOD UNIT TYPE: NORMAL
BLOOD UNIT TYPE: NORMAL
BUN SERPL-MCNC: 26 MG/DL (ref 8–23)
CALCIUM SERPL-MCNC: 8.3 MG/DL (ref 8.7–10.5)
CHLORIDE SERPL-SCNC: 102 MMOL/L (ref 95–110)
CO2 SERPL-SCNC: 24 MMOL/L (ref 23–29)
CODING SYSTEM: NORMAL
CODING SYSTEM: NORMAL
CREAT SERPL-MCNC: 1.3 MG/DL (ref 0.5–1.4)
CROSSMATCH INTERPRETATION: NORMAL
CROSSMATCH INTERPRETATION: NORMAL
DIFFERENTIAL METHOD BLD: ABNORMAL
DISPENSE STATUS: NORMAL
DISPENSE STATUS: NORMAL
EOSINOPHIL # BLD AUTO: 0.1 K/UL (ref 0–0.5)
EOSINOPHIL NFR BLD: 0.9 % (ref 0–8)
ERYTHROCYTE [DISTWIDTH] IN BLOOD BY AUTOMATED COUNT: 12.6 % (ref 11.5–14.5)
EST. GFR  (NO RACE VARIABLE): 55.5 ML/MIN/1.73 M^2
GLUCOSE SERPL-MCNC: 133 MG/DL (ref 70–110)
HCT VFR BLD AUTO: 33.1 % (ref 40–54)
HGB BLD-MCNC: 11.1 G/DL (ref 14–18)
IMM GRANULOCYTES # BLD AUTO: 0.05 K/UL (ref 0–0.04)
IMM GRANULOCYTES NFR BLD AUTO: 0.5 % (ref 0–0.5)
INR PPP: 1 (ref 0.8–1.2)
LYMPHOCYTES # BLD AUTO: 0.7 K/UL (ref 1–4.8)
LYMPHOCYTES NFR BLD: 6.7 % (ref 18–48)
MAGNESIUM SERPL-MCNC: 2.2 MG/DL (ref 1.6–2.6)
MCH RBC QN AUTO: 32.5 PG (ref 27–31)
MCHC RBC AUTO-ENTMCNC: 33.5 G/DL (ref 32–36)
MCV RBC AUTO: 97 FL (ref 82–98)
MONOCYTES # BLD AUTO: 1 K/UL (ref 0.3–1)
MONOCYTES NFR BLD: 9.3 % (ref 4–15)
NEUTROPHILS # BLD AUTO: 8.6 K/UL (ref 1.8–7.7)
NEUTROPHILS NFR BLD: 82.3 % (ref 38–73)
NRBC BLD-RTO: 0 /100 WBC
NUM UNITS TRANS PACKED RBC: NORMAL
NUM UNITS TRANS PACKED RBC: NORMAL
PHOSPHATE SERPL-MCNC: 2.5 MG/DL (ref 2.7–4.5)
PLATELET # BLD AUTO: 129 K/UL (ref 150–450)
PMV BLD AUTO: 10.9 FL (ref 9.2–12.9)
POCT GLUCOSE: 131 MG/DL (ref 70–110)
POCT GLUCOSE: 131 MG/DL (ref 70–110)
POCT GLUCOSE: 133 MG/DL (ref 70–110)
POCT GLUCOSE: 143 MG/DL (ref 70–110)
POCT GLUCOSE: 148 MG/DL (ref 70–110)
POCT GLUCOSE: 159 MG/DL (ref 70–110)
POTASSIUM SERPL-SCNC: 3.9 MMOL/L (ref 3.5–5.1)
POTASSIUM SERPL-SCNC: 4 MMOL/L (ref 3.5–5.1)
PROT SERPL-MCNC: 5.2 G/DL (ref 6–8.4)
PROTHROMBIN TIME: 11 SEC (ref 9–12.5)
RBC # BLD AUTO: 3.42 M/UL (ref 4.6–6.2)
SODIUM SERPL-SCNC: 134 MMOL/L (ref 136–145)
WBC # BLD AUTO: 10.45 K/UL (ref 3.9–12.7)

## 2024-12-16 PROCEDURE — 83735 ASSAY OF MAGNESIUM: CPT

## 2024-12-16 PROCEDURE — 63600175 PHARM REV CODE 636 W HCPCS

## 2024-12-16 PROCEDURE — 25000003 PHARM REV CODE 250

## 2024-12-16 PROCEDURE — 94799 UNLISTED PULMONARY SVC/PX: CPT

## 2024-12-16 PROCEDURE — 99900035 HC TECH TIME PER 15 MIN (STAT)

## 2024-12-16 PROCEDURE — 20600001 HC STEP DOWN PRIVATE ROOM

## 2024-12-16 PROCEDURE — 94761 N-INVAS EAR/PLS OXIMETRY MLT: CPT

## 2024-12-16 PROCEDURE — 85025 COMPLETE CBC W/AUTO DIFF WBC: CPT

## 2024-12-16 PROCEDURE — 97116 GAIT TRAINING THERAPY: CPT

## 2024-12-16 PROCEDURE — 25000003 PHARM REV CODE 250: Performed by: STUDENT IN AN ORGANIZED HEALTH CARE EDUCATION/TRAINING PROGRAM

## 2024-12-16 PROCEDURE — 99232 SBSQ HOSP IP/OBS MODERATE 35: CPT | Mod: ,,, | Performed by: NURSE PRACTITIONER

## 2024-12-16 PROCEDURE — 80053 COMPREHEN METABOLIC PANEL: CPT | Performed by: STUDENT IN AN ORGANIZED HEALTH CARE EDUCATION/TRAINING PROGRAM

## 2024-12-16 PROCEDURE — 84100 ASSAY OF PHOSPHORUS: CPT

## 2024-12-16 PROCEDURE — 36415 COLL VENOUS BLD VENIPUNCTURE: CPT

## 2024-12-16 PROCEDURE — 85730 THROMBOPLASTIN TIME PARTIAL: CPT

## 2024-12-16 PROCEDURE — 51798 US URINE CAPACITY MEASURE: CPT

## 2024-12-16 PROCEDURE — 85610 PROTHROMBIN TIME: CPT

## 2024-12-16 PROCEDURE — 94640 AIRWAY INHALATION TREATMENT: CPT

## 2024-12-16 PROCEDURE — 25000242 PHARM REV CODE 250 ALT 637 W/ HCPCS

## 2024-12-16 PROCEDURE — 25000003 PHARM REV CODE 250: Performed by: THORACIC SURGERY (CARDIOTHORACIC VASCULAR SURGERY)

## 2024-12-16 RX ORDER — IPRATROPIUM BROMIDE AND ALBUTEROL SULFATE 2.5; .5 MG/3ML; MG/3ML
3 SOLUTION RESPIRATORY (INHALATION)
Status: DISCONTINUED | OUTPATIENT
Start: 2024-12-16 | End: 2024-12-19

## 2024-12-16 RX ORDER — FUROSEMIDE 10 MG/ML
20 INJECTION INTRAMUSCULAR; INTRAVENOUS 2 TIMES DAILY
Status: DISCONTINUED | OUTPATIENT
Start: 2024-12-16 | End: 2024-12-17

## 2024-12-16 RX ORDER — POTASSIUM CHLORIDE 20 MEQ/1
20 TABLET, EXTENDED RELEASE ORAL 2 TIMES DAILY
Status: DISCONTINUED | OUTPATIENT
Start: 2024-12-16 | End: 2024-12-21 | Stop reason: HOSPADM

## 2024-12-16 RX ORDER — LIDOCAINE 50 MG/G
3 PATCH TOPICAL
Status: DISCONTINUED | OUTPATIENT
Start: 2024-12-16 | End: 2024-12-21 | Stop reason: HOSPADM

## 2024-12-16 RX ADMIN — POTASSIUM CHLORIDE 20 MEQ: 1500 TABLET, EXTENDED RELEASE ORAL at 09:12

## 2024-12-16 RX ADMIN — MUPIROCIN 1 G: 20 OINTMENT TOPICAL at 09:12

## 2024-12-16 RX ADMIN — OXYCODONE 5 MG: 5 TABLET ORAL at 04:12

## 2024-12-16 RX ADMIN — ASPIRIN 325 MG ORAL TABLET 325 MG: 325 PILL ORAL at 09:12

## 2024-12-16 RX ADMIN — DOCUSATE SODIUM 100 MG: 100 CAPSULE, LIQUID FILLED ORAL at 09:12

## 2024-12-16 RX ADMIN — ACETAMINOPHEN 650 MG: 325 TABLET ORAL at 05:12

## 2024-12-16 RX ADMIN — FUROSEMIDE 20 MG: 10 INJECTION, SOLUTION INTRAMUSCULAR; INTRAVENOUS at 05:12

## 2024-12-16 RX ADMIN — IPRATROPIUM BROMIDE AND ALBUTEROL SULFATE 3 ML: 2.5; .5 SOLUTION RESPIRATORY (INHALATION) at 10:12

## 2024-12-16 RX ADMIN — ACETAMINOPHEN 650 MG: 325 TABLET ORAL at 09:12

## 2024-12-16 RX ADMIN — TAMSULOSIN HYDROCHLORIDE 0.4 MG: 0.4 CAPSULE ORAL at 09:12

## 2024-12-16 RX ADMIN — SENNOSIDES AND DOCUSATE SODIUM 1 TABLET: 50; 8.6 TABLET ORAL at 09:12

## 2024-12-16 RX ADMIN — HEPARIN SODIUM 7500 UNITS: 5000 INJECTION INTRAVENOUS; SUBCUTANEOUS at 05:12

## 2024-12-16 RX ADMIN — FAMOTIDINE 20 MG: 10 INJECTION, SOLUTION INTRAVENOUS at 09:12

## 2024-12-16 RX ADMIN — HEPARIN SODIUM 7500 UNITS: 5000 INJECTION INTRAVENOUS; SUBCUTANEOUS at 09:12

## 2024-12-16 RX ADMIN — POLYETHYLENE GLYCOL 3350 17 G: 17 POWDER, FOR SOLUTION ORAL at 09:12

## 2024-12-16 RX ADMIN — LEVOTHYROXINE SODIUM 75 MCG: 25 TABLET ORAL at 04:12

## 2024-12-16 RX ADMIN — FUROSEMIDE 20 MG: 10 INJECTION, SOLUTION INTRAMUSCULAR; INTRAVENOUS at 09:12

## 2024-12-16 RX ADMIN — METOPROLOL TARTRATE 12.5 MG: 25 TABLET, FILM COATED ORAL at 09:12

## 2024-12-16 RX ADMIN — ACETAMINOPHEN 650 MG: 325 TABLET ORAL at 01:12

## 2024-12-16 RX ADMIN — LIDOCAINE 3 PATCH: 50 PATCH CUTANEOUS at 10:12

## 2024-12-16 RX ADMIN — HEPARIN SODIUM 7500 UNITS: 5000 INJECTION INTRAVENOUS; SUBCUTANEOUS at 01:12

## 2024-12-16 RX ADMIN — BISACODYL 10 MG: 10 SUPPOSITORY RECTAL at 05:12

## 2024-12-16 RX ADMIN — NIFEDIPINE 60 MG: 60 TABLET, FILM COATED, EXTENDED RELEASE ORAL at 09:12

## 2024-12-16 RX ADMIN — MELATONIN TAB 3 MG 6 MG: 3 TAB at 09:12

## 2024-12-16 RX ADMIN — IPRATROPIUM BROMIDE AND ALBUTEROL SULFATE 3 ML: 2.5; .5 SOLUTION RESPIRATORY (INHALATION) at 07:12

## 2024-12-16 RX ADMIN — ATORVASTATIN CALCIUM 40 MG: 40 TABLET, FILM COATED ORAL at 09:12

## 2024-12-16 NOTE — ANESTHESIA POSTPROCEDURE EVALUATION
Anesthesia Post Evaluation    Patient: Wiliam Arreola    Procedure(s) Performed: Procedure(s) (LRB):  REPAIR, MITRAL VALVE, OPEN (N/A)    Final Anesthesia Type: general      Patient location during evaluation: ICU  Patient participation: No - Unable to Participate, Intubation  Level of consciousness: sedated  Post-procedure vital signs: reviewed and stable  Pain management: adequate  Airway patency: patent    PONV status at discharge: No PONV  Anesthetic complications: no      Cardiovascular status: hemodynamically stable  Respiratory status: ventilator and ETT  Hydration status: euvolemic                Vitals Value   /63   Temp 36.5 °C (97.7 °F)   Pulse 76   Resp 16   SpO2 95 %         No case tracking events are documented in the log.      Pain/Cally Score: Pain Rating Prior to Med Admin: 0   Pain Rating Post Med Admin: 0

## 2024-12-16 NOTE — SUBJECTIVE & OBJECTIVE
Interval History: patient had episodes of delirium overnight for which tele monitor was placed. He is oriented x 4 this morning. Plan to hold oxy and give schedule tylenol for pain. Complains of SOB and has bilateral wheezing, breathing treatment ordered Q4WA and lasix 20 IV BID. Chest tube and vega removed. He is also hypervolemic on exam and neg 866 ml/24 hrs. Orders place to walk patient TID by nursing staff.     Review of Systems   Constitutional: Negative for malaise/fatigue.   Cardiovascular:  Negative for chest pain, claudication, dyspnea on exertion, irregular heartbeat, leg swelling and palpitations.   Respiratory:  Positive for shortness of breath and wheezing. Negative for cough.    Hematologic/Lymphatic: Negative for bleeding problem.   Gastrointestinal:  Negative for abdominal pain.   Genitourinary:  Negative for dysuria.   Neurological:  Negative for headaches and weakness.     Medications:  Continuous Infusions:   insulin regular 1 units/mL infusion orderable (TRANSFER)  1 Units/hr Intravenous Continuous 1 mL/hr at 12/15/24 1700 1 Units/hr at 12/15/24 1700     Scheduled Meds:   acetaminophen  650 mg Oral Q8H    albumin human 5%  25 g Intravenous Once    albuterol-ipratropium  3 mL Nebulization Q4H WAKE    aspirin  325 mg Oral Daily    atorvastatin  40 mg Oral Daily    docusate sodium  100 mg Oral BID    famotidine (PF)  20 mg Intravenous Daily    furosemide (LASIX) injection  20 mg Intravenous BID    heparin (porcine)  7,500 Units Subcutaneous Q8H    levothyroxine  75 mcg Oral Before breakfast    LIDOcaine  3 patch Transdermal Q24H    melatonin  6 mg Oral Nightly    metoprolol tartrate  12.5 mg Oral BID    mupirocin  1 g Nasal BID    NIFEdipine  60 mg Oral Daily    polyethylene glycol  17 g Oral BID    potassium chloride  20 mEq Oral BID    senna-docusate 8.6-50 mg  1 tablet Oral Daily    tamsulosin  0.4 mg Oral Daily     PRN Meds:  Current Facility-Administered Medications:     albumin human 5%, 25  g, Intravenous, Once PRN    aspirin, 300 mg, Rectal, Once PRN    bisacodyL, 10 mg, Rectal, Daily PRN    dextrose 10%, 12.5 g, Intravenous, PRN    dextrose 10%, 25 g, Intravenous, PRN    dextrose, 16 g, Oral, PRN    dextrose, 16 g, Oral, PRN    dextrose, 24 g, Oral, PRN    glucagon (human recombinant), 1 mg, Intramuscular, PRN    glucose, 16 g, Oral, PRN    glucose, 24 g, Oral, PRN    insulin aspart U-100, 0-5 Units, Subcutaneous, Q4H PRN    metoclopramide, 5 mg, Intravenous, Q6H PRN    ondansetron, 4 mg, Intravenous, Q12H PRN    oxyCODONE, 5 mg, Oral, Q4H PRN    sodium chloride 0.9%, 10 mL, Intravenous, PRN     Objective:     Vital Signs (Most Recent):  Temp: 97.7 °F (36.5 °C) (12/16/24 0757)  Pulse: 76 (12/16/24 0757)  Resp: 16 (12/16/24 0757)  BP: 132/63 (12/16/24 0757)  SpO2: 95 % (12/16/24 0757) Vital Signs (24h Range):  Temp:  [97.3 °F (36.3 °C)-99.7 °F (37.6 °C)] 97.7 °F (36.5 °C)  Pulse:  [72-97] 76  Resp:  [13-39] 16  SpO2:  [92 %-97 %] 95 %  BP: ()/(57-77) 132/63     Weight: 85.6 kg (188 lb 11.4 oz)  Body mass index is 29.56 kg/m².    SpO2: 95 %       Intake/Output - Last 3 Shifts         12/14 0700  12/15 0659 12/15 0700  12/16 0659 12/16 0700  12/17 0659    P.O.  480 240    I.V. (mL/kg) 153.5 (1.8) 44.3 (0.5)     IV Piggyback       Total Intake(mL/kg) 153.5 (1.8) 524.3 (6.1) 240 (2.8)    Urine (mL/kg/hr) 772 (0.4) 1330 (0.6) 200 (0.6)    Stool  0     Chest Tube 190 60     Total Output 962 1390 200    Net -808.5 -865.7 +40           Urine Occurrence  0 x     Stool Occurrence  0 x             Lines/Drains/Airways       Line  Duration                  Pacer Wires 12/13/24 1214 2 days              Peripheral Intravenous Line  Duration                  Peripheral IV - Single Lumen 12/15/24 2200 20 G No Right Antecubital <1 day                     Physical Exam  Constitutional:       Appearance: Normal appearance.   HENT:      Head: Normocephalic.   Eyes:      Pupils: Pupils are equal, round, and reactive  to light.   Cardiovascular:      Rate and Rhythm: Normal rate and regular rhythm.      Pulses: Normal pulses.      Comments: Pacer wires intact   Pulmonary:      Effort: Pulmonary effort is normal.      Breath sounds: Wheezing present.   Abdominal:      General: Abdomen is flat. Bowel sounds are normal.      Palpations: Abdomen is soft.   Musculoskeletal:         General: Normal range of motion.   Skin:     General: Skin is warm and dry.      Comments: MSI CDI   Neurological:      General: No focal deficit present.      Mental Status: He is alert.            Significant Labs:  CBC:   Recent Labs   Lab 12/16/24  0246   WBC 10.45   RBC 3.42*   HGB 11.1*   HCT 33.1*   *   MCV 97   MCH 32.5*   MCHC 33.5     CMP:   Recent Labs   Lab 12/16/24 0246   *   CALCIUM 8.3*   ALBUMIN 2.5*   PROT 5.2*   *   K 3.9   CO2 24      BUN 26*   CREATININE 1.3   ALKPHOS 53   ALT 6*   AST 31   BILITOT 0.8     Coagulation:   Recent Labs   Lab 12/16/24  0246   INR 1.0   APTT 41.1*     LFTs:   Recent Labs   Lab 12/16/24  0246   ALT 6*   AST 31   ALKPHOS 53   BILITOT 0.8   PROT 5.2*   ALBUMIN 2.5*       Significant Diagnostics:  I have reviewed all pertinent imaging results/findings within the past 24 hours.

## 2024-12-16 NOTE — SUBJECTIVE & OBJECTIVE
"Interval HPI:   Overnight events: No acute events overnight. Patient in room 330/330 A. Blood glucose stable. BG at goal on current insulin regimen (Transition Insulin Drip). Steroid use- None. 3 Days Post-Op  Renal function- Normal   Vasopressors-  None       Endocrine will continue to follow and manage insulin orders inpatient.       Diet Cardiac     Eatin%  Nausea: No  Hypoglycemia and intervention: No  Fever: No  TPN and/or TF: No    /63 (BP Location: Left arm)   Pulse 73   Temp 97.7 °F (36.5 °C) (Oral)   Resp 17   Ht 5' 7" (1.702 m)   Wt 85.6 kg (188 lb 11.4 oz)   SpO2 95%   BMI 29.56 kg/m²     Labs Reviewed and Include    Recent Labs   Lab 24  0246   *   CALCIUM 8.3*   ALBUMIN 2.5*   PROT 5.2*   *   K 3.9   CO2 24      BUN 26*   CREATININE 1.3   ALKPHOS 53   ALT 6*   AST 31   BILITOT 0.8     Lab Results   Component Value Date    WBC 10.45 2024    HGB 11.1 (L) 2024    HCT 33.1 (L) 2024    MCV 97 2024     (L) 2024     No results for input(s): "TSH", "FREET4" in the last 168 hours.  Lab Results   Component Value Date    HGBA1C 6.1 (H) 2024       Nutritional status:   Body mass index is 29.56 kg/m².  Lab Results   Component Value Date    ALBUMIN 2.5 (L) 2024    ALBUMIN 3.0 (L) 12/15/2024    ALBUMIN 3.4 (L) 2024     No results found for: "PREALBUMIN"    Estimated Creatinine Clearance: 47.4 mL/min (based on SCr of 1.3 mg/dL).    Accu-Checks  Recent Labs     24  2031 12/15/24  0016 12/15/24  0429 12/15/24  0821 12/15/24  1314 12/15/24  1716 12/15/24  2056 24  0039 24  0403 24  1130   POCTGLUCOSE 155* 144* 131* 132* 164* 159* 179* 159* 143* 131*       Current Medications and/or Treatments Impacting Glycemic Control  Immunotherapy:    Immunosuppressants       None          Steroids:   Hormones (From admission, onward)      Start     Stop Route Frequency Ordered    12/15/24 2100  melatonin tablet 6 " mg         -- Oral Nightly 12/15/24 0632          Pressors:    Autonomic Drugs (From admission, onward)      Start     Stop Route Frequency Ordered    12/15/24 0730  metoprolol tartrate (LOPRESSOR) split tablet 12.5 mg         -- Oral 2 times daily 12/15/24 0715          Hyperglycemia/Diabetes Medications:   Antihyperglycemics (From admission, onward)      Start     Stop Route Frequency Ordered    12/14/24 1245  insulin aspart U-100 pen 0-5 Units         -- SubQ Every 4 hours PRN 12/14/24 1145    12/14/24 1245  insulin regular in 0.9 % NaCl 100 unit/100 mL (1 unit/mL) infusion        Question:  Enter initial dose (Units/hr):  Answer:  1    -- IV Continuous 12/14/24 1145

## 2024-12-16 NOTE — NURSING
"Pt arrived to CSU via wheelchair as a downgrade from SICU with 9/10 pain.  Faisal SICU RN reported that the patient's chest tube was to wall suction, but upon arrival, pt's chest tube is on water seal with no suction tubing present.  Chest tube pigtails are twisted and kinked in the patient's groin and pacer wires are taped to pt's scrotum.  Pt's LFA IV is bleeding and out of patient's arm.  Insulin gtt paused for 45 minutes while all tubes & wires secured and new IV obtained.  VSS on RA.  STEVEN RN    Addendum 2340:  Faisal SICU RN reported that pt, "says some off the wall things sometimes", but denied that pt ever got confused or pulled at wires/tubes.  At 2340, pt's bed alarm started going off.  This RN entered room and pt was standing up tearing his gown off.  Pt stated, "I'm leaving!  I got shit to do!"  When asked pt what he needed to do, pt stated, "None of your business!!"  After multiple attempts, pt was able to be redirected and laid back in bed.  Telesitter order placed and camera placed in room for patient safety.  Bed alarm on.  Call light within reach.  STEVEN RN    Addendum 0100:  Entered pt room and pt stated, "Time to get up!"  Reminded pt that he was in the hospital and pt got angry stating, "I know I'm in the hospital!  I didn't just fall into the hospital - I don't know the section I'm in.  You can't keep me hostage".  Informed pt that he can get up, but we just ask that he call for assistance in getting up due to all of his attached wires & cords.  Pt agreeable to staying in bed.  Bed alarm on.  Call light within reach.  STEVEN RN    Addendum 0343:  Telesitter called & stated that pt just removed his Bautista catheter.  This RN & Jono RN entered room and pt stated, "I was trying to get up".  Pt's pacer wires, chest tubes, IV, and Bautista catheter still intact.  Pt stated, "I need to leave this hospital - I am just not happy with my treatment here".  When pt was asked what part of his treatment he didn't " "like, pt kept repeating, "I'm just not happy with my treatment".  When asked what could be done to improve his treatment, pt stated, "I just don't know".  After several minutes, pt stated, "I'm sorry, I just don't know what's wrong with my mind.  My mind isn't right.  I'm sorry that I'm acting crazy".  Pt denied these episodes happened prior to admission.  Informed pt that he is experiencing hospital delirium & provided emotional support.  Pt denied having someone who could come stay with him at night.  Pt kept apologizing stating, "I'm sorry I'm such an asshole" & "I'm sorry I'm such a pain in the ass".  Pt asked this RN not to talk to the MD about his behavior stating, "Please don't, they'll be mad at me".  Informed pt that he isn't doing anything wrong.  Pain medication administered.  Bed alarm on.  Call light within reach.  STEVEN RN  "

## 2024-12-16 NOTE — PROGRESS NOTES
[facility] - [facility]iology Stepdown  Cardiothoracic Surgery  Progress Note    Patient Name: Wiliam Arreola  MRN: [id number]  Admission Date: 12/13/2024  Hospital Length of Stay: 3 days  Code Status: Prior   Attending Physician: Babatunde Gates MD   Referring Provider: Babatunde Gates MD  Principal Problem:S/P MVR (mitral valve repair)            Subjective:     Post-Op Info:  Procedure(s) (LRB):  REPAIR, MITRAL VALVE, OPEN (N/A)   3 Days Post-Op     Interval History: patient had episodes of delirium overnight for which tele monitor was placed. He is oriented x 4 this morning. Plan to hold oxy and give schedule tylenol for pain. Complains of SOB and has bilateral wheezing, breathing treatment ordered Q4WA and lasix 20 IV BID. Chest tube and vega removed. He is also hypervolemic on exam and neg 866 ml/24 hrs. Orders place to walk patient TID by nursing staff.     Review of Systems   Constitutional: Negative for malaise/fatigue.   Cardiovascular:  Negative for chest pain, claudication, dyspnea on exertion, irregular heartbeat, leg swelling and palpitations.   Respiratory:  Positive for shortness of breath and wheezing. Negative for cough.    Hematologic/Lymphatic: Negative for bleeding problem.   Gastrointestinal:  Negative for abdominal pain.   Genitourinary:  Negative for dysuria.   Neurological:  Negative for headaches and weakness.     Medications:  Continuous Infusions:   insulin regular 1 units/mL infusion orderable (TRANSFER)  1 Units/hr Intravenous Continuous 1 mL/hr at 12/15/24 1700 1 Units/hr at 12/15/24 1700     Scheduled Meds:   acetaminophen  650 mg Oral Q8H    albumin human 5%  25 g Intravenous Once    albuterol-ipratropium  3 mL Nebulization Q4H WAKE    aspirin  325 mg Oral Daily    atorvastatin  40 mg Oral Daily    docusate sodium  100 mg Oral BID    famotidine (PF)  20 mg Intravenous Daily    furosemide (LASIX) injection  20 mg Intravenous BID    heparin (porcine)  7,500 Units Subcutaneous Q8H     levothyroxine  75 mcg Oral Before breakfast    LIDOcaine  3 patch Transdermal Q24H    melatonin  6 mg Oral Nightly    metoprolol tartrate  12.5 mg Oral BID    mupirocin  1 g Nasal BID    NIFEdipine  60 mg Oral Daily    polyethylene glycol  17 g Oral BID    potassium chloride  20 mEq Oral BID    senna-docusate 8.6-50 mg  1 tablet Oral Daily    tamsulosin  0.4 mg Oral Daily     PRN Meds:  Current Facility-Administered Medications:     albumin human 5%, 25 g, Intravenous, Once PRN    aspirin, 300 mg, Rectal, Once PRN    bisacodyL, 10 mg, Rectal, Daily PRN    dextrose 10%, 12.5 g, Intravenous, PRN    dextrose 10%, 25 g, Intravenous, PRN    dextrose, 16 g, Oral, PRN    dextrose, 16 g, Oral, PRN    dextrose, 24 g, Oral, PRN    glucagon (human recombinant), 1 mg, Intramuscular, PRN    glucose, 16 g, Oral, PRN    glucose, 24 g, Oral, PRN    insulin aspart U-100, 0-5 Units, Subcutaneous, Q4H PRN    metoclopramide, 5 mg, Intravenous, Q6H PRN    ondansetron, 4 mg, Intravenous, Q12H PRN    oxyCODONE, 5 mg, Oral, Q4H PRN    sodium chloride 0.9%, 10 mL, Intravenous, PRN     Objective:     Vital Signs (Most Recent):  Temp: 97.7 °F (36.5 °C) (12/16/24 0757)  Pulse: 76 (12/16/24 0757)  Resp: 16 (12/16/24 0757)  BP: 132/63 (12/16/24 0757)  SpO2: 95 % (12/16/24 0757) Vital Signs (24h Range):  Temp:  [97.3 °F (36.3 °C)-99.7 °F (37.6 °C)] 97.7 °F (36.5 °C)  Pulse:  [72-97] 76  Resp:  [13-39] 16  SpO2:  [92 %-97 %] 95 %  BP: ()/(57-77) 132/63     Weight: 85.6 kg (188 lb 11.4 oz)  Body mass index is 29.56 kg/m².    SpO2: 95 %       Intake/Output - Last 3 Shifts         12/14 0700  12/15 0659 12/15 0700 12/16 0659 12/16 0700  12/17 0659    P.O.  480 240    I.V. (mL/kg) 153.5 (1.8) 44.3 (0.5)     IV Piggyback       Total Intake(mL/kg) 153.5 (1.8) 524.3 (6.1) 240 (2.8)    Urine (mL/kg/hr) 772 (0.4) 1330 (0.6) 200 (0.6)    Stool  0     Chest Tube 190 60     Total Output 962 1390 200    Net -808.5 -865.7 +40           Urine  Occurrence  0 x     Stool Occurrence  0 x             Lines/Drains/Airways       Line  Duration                  Pacer Wires 12/13/24 1214 2 days              Peripheral Intravenous Line  Duration                  Peripheral IV - Single Lumen 12/15/24 2200 20 G No Right Antecubital <1 day                     Physical Exam  Constitutional:       Appearance: Normal appearance.   HENT:      Head: Normocephalic.   Eyes:      Pupils: Pupils are equal, round, and reactive to light.   Cardiovascular:      Rate and Rhythm: Normal rate and regular rhythm.      Pulses: Normal pulses.      Comments: Pacer wires intact   Pulmonary:      Effort: Pulmonary effort is normal.      Breath sounds: Wheezing present.   Abdominal:      General: Abdomen is flat. Bowel sounds are normal.      Palpations: Abdomen is soft.   Musculoskeletal:         General: Normal range of motion.   Skin:     General: Skin is warm and dry.      Comments: MSI CDI   Neurological:      General: No focal deficit present.      Mental Status: He is alert.            Significant Labs:  CBC:   Recent Labs   Lab 12/16/24  0246   WBC 10.45   RBC 3.42*   HGB 11.1*   HCT 33.1*   *   MCV 97   MCH 32.5*   MCHC 33.5     CMP:   Recent Labs   Lab 12/16/24  0246   *   CALCIUM 8.3*   ALBUMIN 2.5*   PROT 5.2*   *   K 3.9   CO2 24      BUN 26*   CREATININE 1.3   ALKPHOS 53   ALT 6*   AST 31   BILITOT 0.8     Coagulation:   Recent Labs   Lab 12/16/24  0246   INR 1.0   APTT 41.1*     LFTs:   Recent Labs   Lab 12/16/24  0246   ALT 6*   AST 31   ALKPHOS 53   BILITOT 0.8   PROT 5.2*   ALBUMIN 2.5*       Significant Diagnostics:  I have reviewed all pertinent imaging results/findings within the past 24 hours.  Assessment/Plan:     * S/P MVR (mitral valve repair)  79 y.o. male with a PMHx of PE, DVT, HTN, severe MR, HLD, now s/p mitral valve repair on 12/13/24      - Maintain sternal precautions   - ASA  - BB  - Statin  - heparin sub q for DVT ppx  - Lasix   with scheduled potassium ordered   - Multimodal pain management   - Encourage ambulation  - PT/OT  - Cardiac diet with 1500 cc fluid restriction   - Bowel regimen in place   - pepcid for ulcer prevention   - Pacer wires intact, plan to remove closer to discharge   - Chest tubes removed   - Bautista remove for voiding trial   - Monitor electrolytes to keep Mag above 2 and Potassium above 4  - OOBTC  - Encourage IS use  - flomax   - duoneb Q4 WA     - DISPO -CSU to home when medically stable          Hypophosphatemia  Scheduled and PRN replacement   Lab daily to monitor trends    Thrombocytopenia  Post op expected   CBC daily to monitor trends     Acute blood loss anemia  - Can be expected postoperatively   - CBC daily to monitor trends   - No need for replacement today        Prediabetes  Endocrine following     Hypothyroidism  Continue home levothyroxine     HTN (hypertension)  Nifedipine 60 PO daily     HLD (hyperlipidemia)  Statin         DARIAN ButtC  Cardiothoracic Surgery  [facility] - [facility] Step[facility]

## 2024-12-16 NOTE — ASSESSMENT & PLAN NOTE
Endocrinology consulted for BG management.   BG goal 110-140 CTS    - Transition drip at 1.0 units/hr with step-down parameters.   - Novolog (aspart) insulin prn for BG excursions LDC SSI (150/50).  - BG checks q4hr  - Hypoglycemia protocol in place    ** Please notify Endocrine for any change and/or advance in diet**  ** Please call Endocrine for any BG related issues **    Discharge Planning:   TBD. Please notify endocrinology prior to discharge.

## 2024-12-16 NOTE — PROGRESS NOTES
"[facility] - [facility]iology Step[facility]  Endocrinology  Progress Note    Admit Date: 2024     Reason for Consult: Management of prediabetic, Hyperglycemia     Surgical Procedure and Date: s/p MVR on 2024    Patient is not diabetic and does not currently take any oral/injectable antidiabetic/hypoglycemic medications.         HPI: Wiliam Arreola is a 79 y.o. male with a PMHx of PE, DVT, HTN, severe MR, HLD, who presents to the SICU s/p mitral valve repair with Dr. Gates. On admission, he is intubated, sedated with propofol, and in stable condition. Endocrine consulted to manage hyperglycemia and pre-diabetes in the context of CTS.     Lab Results   Component Value Date    HGBA1C 6.1 (H) 2024           Interval HPI:   Overnight events: No acute events overnight. Patient in room 330/330 A. Blood glucose stable. BG at goal on current insulin regimen (Transition Insulin Drip). Steroid use- None. 3 Days Post-Op  Renal function- Normal   Vasopressors-  None       Endocrine will continue to follow and manage insulin orders inpatient.       Diet Cardiac     Eatin%  Nausea: No  Hypoglycemia and intervention: No  Fever: No  TPN and/or TF: No    /63 (BP Location: Left arm)   Pulse 73   Temp 97.7 °F (36.5 °C) (Oral)   Resp 17   Ht 5' 7" (1.702 m)   Wt 85.6 kg (188 lb 11.4 oz)   SpO2 95%   BMI 29.56 kg/m²     Labs Reviewed and Include    Recent Labs   Lab 24  0246   *   CALCIUM 8.3*   ALBUMIN 2.5*   PROT 5.2*   *   K 3.9   CO2 24      BUN 26*   CREATININE 1.3   ALKPHOS 53   ALT 6*   AST 31   BILITOT 0.8     Lab Results   Component Value Date    WBC 10.45 2024    HGB 11.1 (L) 2024    HCT 33.1 (L) 2024    MCV 97 2024     (L) 2024     No results for input(s): "TSH", "FREET4" in the last 168 hours.  Lab Results   Component Value Date    HGBA1C 6.1 (H) 2024       Nutritional status:   Body mass index is 29.56 kg/m².  Lab Results " "  Component Value Date    ALBUMIN 2.5 (L) 12/16/2024    ALBUMIN 3.0 (L) 12/15/2024    ALBUMIN 3.4 (L) 12/14/2024     No results found for: "PREALBUMIN"    Estimated Creatinine Clearance: 47.4 mL/min (based on SCr of 1.3 mg/dL).    Accu-Checks  Recent Labs     12/14/24  2031 12/15/24  0016 12/15/24  0429 12/15/24  0821 12/15/24  1314 12/15/24  1716 12/15/24  2056 12/16/24  0039 12/16/24  0403 12/16/24  1130   POCTGLUCOSE 155* 144* 131* 132* 164* 159* 179* 159* 143* 131*       Current Medications and/or Treatments Impacting Glycemic Control  Immunotherapy:    Immunosuppressants       None          Steroids:   Hormones (From admission, onward)      Start     Stop Route Frequency Ordered    12/15/24 2100  melatonin tablet 6 mg         -- Oral Nightly 12/15/24 0632          Pressors:    Autonomic Drugs (From admission, onward)      Start     Stop Route Frequency Ordered    12/15/24 0730  metoprolol tartrate (LOPRESSOR) split tablet 12.5 mg         -- Oral 2 times daily 12/15/24 0715          Hyperglycemia/Diabetes Medications:   Antihyperglycemics (From admission, onward)      Start     Stop Route Frequency Ordered    12/14/24 1245  insulin aspart U-100 pen 0-5 Units         -- SubQ Every 4 hours PRN 12/14/24 1145    12/14/24 1245  insulin regular in 0.9 % NaCl 100 unit/100 mL (1 unit/mL) infusion        Question:  Enter initial dose (Units/hr):  Answer:  1    -- IV Continuous 12/14/24 1145            ASSESSMENT and PLAN    Cardiac/Vascular  Mitral regurgitation due to cusp prolapse  Managed per primary team  Avoid hypoglycemia        Rupture of chordae tendineae  S/p MVR  Optimize BG control to improve wound healing        Endocrine  Prediabetes  Endocrinology consulted for BG management.   BG goal 110-140 CTS    - Transition drip at 1.0 units/hr with step-down parameters.   - Novolog (aspart) insulin prn for BG excursions LDC SSI (150/50).  - BG checks q4hr  - Hypoglycemia protocol in place    ** Please notify " Endocrine for any change and/or advance in diet**  ** Please call Endocrine for any BG related issues **    Discharge Planning:   TBD. Please notify endocrinology prior to discharge.             Arie Villalobos, DNP, FNP  Endocrinology  [facility] - [facility]iology [facility]

## 2024-12-16 NOTE — PLAN OF CARE
Problem: Adult Inpatient Plan of Care  Goal: Plan of Care Review  Outcome: Progressing  Goal: Patient-Specific Goal (Individualized)  Outcome: Progressing  Goal: Absence of Hospital-Acquired Illness or Injury  Outcome: Progressing  Goal: Optimal Comfort and Wellbeing  Outcome: Progressing  Goal: Readiness for Transition of Care  Outcome: Progressing     Problem: Cardiovascular Surgery  Goal: Improved Activity Tolerance  Outcome: Progressing  Goal: Optimal Coping with Heart Surgery  Outcome: Progressing  Goal: Absence of Bleeding  Outcome: Progressing  Goal: Effective Bowel Elimination  Outcome: Progressing  Goal: Effective Cardiac Function  Outcome: Progressing  Goal: Optimal Cerebral Tissue Perfusion  Outcome: Progressing  Goal: Fluid and Electrolyte Balance  Outcome: Progressing  Goal: Blood Glucose Level Within Targeted Range  Outcome: Progressing  Goal: Absence of Infection Signs and Symptoms  Outcome: Progressing  Goal: Anesthesia/Sedation Recovery  Outcome: Progressing  Goal: Acceptable Pain Control  Outcome: Progressing  Goal: Nausea and Vomiting Relief  Outcome: Progressing  Goal: Effective Urinary Elimination  Outcome: Progressing  Goal: Effective Oxygenation and Ventilation  Outcome: Progressing     Problem: Wound  Goal: Optimal Coping  Outcome: Progressing  Goal: Optimal Functional Ability  Outcome: Progressing  Goal: Absence of Infection Signs and Symptoms  Outcome: Progressing  Goal: Improved Oral Intake  Outcome: Progressing  Goal: Optimal Pain Control and Function  Outcome: Progressing  Goal: Skin Health and Integrity  Outcome: Progressing  Goal: Optimal Wound Healing  Outcome: Progressing     Problem: Infection  Goal: Absence of Infection Signs and Symptoms  Outcome: Progressing     Problem: Fall Injury Risk  Goal: Absence of Fall and Fall-Related Injury  Outcome: Progressing     Problem: Restraint, Nonviolent  Goal: Absence of Harm or Injury  Outcome: Progressing     Problem: Skin Injury Risk  Increased  Goal: Skin Health and Integrity  Outcome: Progressing

## 2024-12-16 NOTE — PT/OT/SLP PROGRESS
"Occupational Therapy      Patient Name:  Wiliam Arreola   MRN:  [id number]    Patient not seen today. Attempt made at 11:35 am - pt presenting up in chair; however, stating that he was not feeling up to working with therapy today. "I'm not feeling worth anything today." Provided education on the importance of continued mobilization and participation in therapeutic activities to increase functional endurance and activity tolerance while in the acute care setting - pt still declining at this time. Will follow-up as appropriate.    12/16/2024  "

## 2024-12-16 NOTE — NURSING TRANSFER
Nursing Transfer Note      12/15/2024   8:20 PM    Nurse giving handoff:YUSUF Malone  Nurse receiving handoff:YUSUF Conway    Reason patient is being transferred: step down orders    Transfer From: SICU    Transfer via wheelchair    Transfer with cardiac monitoring    Transported by RN    Transfer Vital Signs:  Blood Pressure:137/58  Heart Rate:93  O2:95  Temperature:97.3  Respirations:18    Order for Tele Monitor? Yes    Additional Lines: Chest Tube and Bautista Catheter    Medicines sent: yes    Any special needs or follow-up needed: no    Patient belongings transferred with patient: Yes    Chart send with patient: Yes    Upon arrival to floor: patient oriented to room, call bell in reach, and bed in lowest position

## 2024-12-16 NOTE — NURSING
Bautista removal at 10:30 am due to void by 1630. Patient has not voided at this time. Bladder scan showing 148 ml. MD notified ordered to re scan in an hour if patient still haven't had output

## 2024-12-16 NOTE — ASSESSMENT & PLAN NOTE
79 y.o. male with a PMHx of PE, DVT, HTN, severe MR, HLD, now s/p mitral valve repair on 12/13/24      - Maintain sternal precautions   - ASA  - BB  - Statin  - heparin sub q for DVT ppx  - Lasix  with scheduled potassium ordered   - Multimodal pain management   - Encourage ambulation  - PT/OT  - Cardiac diet with 1500 cc fluid restriction   - Bowel regimen in place   - pepcid for ulcer prevention   - Pacer wires intact, plan to remove closer to discharge   - Chest tubes removed   - Bautista remove for voiding trial   - Monitor electrolytes to keep Mag above 2 and Potassium above 4  - OOBTC  - Encourage IS use  - flomax   - duoneb Q4 WA     - DISPO -CSU to home when medically stable

## 2024-12-16 NOTE — PT/OT/SLP PROGRESS
Physical Therapy Co-Treatment    Patient Name:  Wiliam Arreola   MRN:  [id number]  Admitting Diagnosis:  S/P MVR (mitral valve repair)   Recent Surgery: Procedure(s) (LRB):  REPAIR, MITRAL VALVE, OPEN (N/A) 3 Days Post-Op  Admit Date: 12/13/2024  Length of Stay: 3 days    Recommendations:     Discharge Recommendations: Moderate Intensity Therapy  Discharge Equipment Recommendations: other (see comments) (to be determined)   Barriers to discharge:  physical assistance needed    Appropriate transfer level with nursing staff: one person assist stand pivot bed<>chair HHA    Plan:     During this hospitalization, patient to be seen 5 x/week to address the identified rehab impairments via gait training, therapeutic activities, therapeutic exercises, neuromuscular re-education and progress towards the established goals.  Plan of Care Expires:  01/13/25  Plan of Care Reviewed with: patient    Assessment     Wiliam Arreola is a 79 y.o. male admitted with a medical diagnosis of S/P MVR (mitral valve repair). Pt found supine in bed and agreeable to therapy session. Pt required continued verbal cueing throughout session for sternal precautions, especially for bed mobility. Upon standing, pt with posterior lean requiring manual facilitation for forward weight shift. Mod A required for gait due to instability (inconsistent foot placement, shuffled gait, flexed posture, and varying width of JAN). Pt veering to wall to use UE support on hallway handrails. Increased WOB noted towards end of gait trial to which pt was coached through deep breathing exercises. Discharge recommendations changed to moderate intensity due to pt's gait instability, decreased endurance, and weakness. Pt would continue to benefit from skilled PT services for improved gait quality/distance and functional mobility.     Problem List: weakness, impaired endurance, impaired functional mobility, gait instability, impaired balance, impaired cardiopulmonary  "response to activity, decreased safety awareness, decreased lower extremity function, impaired self care skills, decreased coordination.  Rehab Prognosis: Fair; patient would benefit from acute skilled PT services to address these deficits and reach maximum level of function.      Goals:   Multidisciplinary Problems       Physical Therapy Goals          Problem: Physical Therapy    Goal Priority Disciplines Outcome Interventions   Physical Therapy Goal     PT, PT/OT Progressing    Description: Goals to be met by:      Patient will increase functional independence with mobility by performin. Supine to sit with Modified [place]  2. Sit to supine with Modified [place]  3. Sit to stand transfer with Modified [place]  4. Gait  x 400 feet with Modified [place] using No Assistive Device.   5. Ascend/descend 3 stair with bilateral Handrails Contact Guard Assistance using LRAD.                          Subjective     RN notified prior to session. No family present upon PT entrance into room. Patient agreeable to PT evaluation. Tele sitter in room.    Chief Complaint: none    Pain/Comfort:  Pain Rating 1: 0/10  Pain Rating Post-Intervention 1: 0/10    Objective:     Additional staff present: Supervising PT    Patient found supine with: telemetry, peripheral IV (telesitter)   Cognition:   Alert  Command following: Follows multistep verbal commands  Fluency: clear/fluent  General Precautions: Standard, Cardiac fall, sternal   Orthopedic Precautions:N/A   Braces: N/A   Body mass index is 29.56 kg/m².  Oxygen Device: Room Air  Vitals: BP (!) 107/57 (BP Location: Left arm, Patient Position: Lying)   Pulse 84   Temp 98.1 °F (36.7 °C) (Oral)   Resp 20   Ht 5' 7" (1.702 m)   Wt 85.6 kg (188 lb 11.4 oz)   SpO2 96%   BMI 29.56 kg/m²     Outcome Measures:  AM-PAC 6 CLICK MOBILITY  Turning over in bed (including adjusting bedclothes, sheets and blankets)?: 3  Sitting down on and standing up from a " chair with arms (e.g., wheelchair, bedside commode, etc.): 3  Moving from lying on back to sitting on the side of the bed?: 3  Moving to and from a bed to a chair (including a wheelchair)?: 2  Need to walk in hospital room?: 2  Climbing 3-5 steps with a railing?: 1  Basic Mobility Total Score: 14     Functional Mobility:    Bed Mobility:   Scooting to HOB via supine bridge: total assistance   Supine to Sit: minimum assistance for trunk management; to L side of bed  Scooting anteriorly to EOB to have both feet planted on floor: moderate assistance   Sit to Supine: moderate assistance for LE management and for trunk management; to L side of bed    Sitting Balance at Edge of Bed:  Static Sitting Balance: Good- : able to accept minimal resistance  Dynamic Sitting Balance: Fair : minimal weight shifting ipsilaterally or anteriorly. Difficulty crossing midline  Assistance Level Required: Stand-by Assistance and Contact Guard Assistance  Time: ~4 min  Postural deviations noted: slouched posture, forward head    Transfers:   Sit <> Stand Transfer: moderate assistance with hand-held assist. [id number] from EOB  Posterior lean    Standing Balance:  Static Standing Balance: Fair- : requires minimal assistance or UE support in order to stand without LOB  Dynamic Standing Balance: Poor+ : able to stand with minimal assistance and reach ipsilaterally. Unable to weight shift.  Assistance Level Required: Moderate Assistance  Patient used: hand-held assist   Postural deviations noted: posterior weight shift      Gait:   Patient ambulated: ~70 ft in hallway  Patient required: moderate assistance  Patient used:  hand-held assist   Gait Pattern observed: reciprocal gait  Gait Deviation(s): unsteady gait, decreased step length, decreased weight shift, decreased foot clearance, flexed posture, decreased katharina, inconsistent bilateral foot placement, and shuffle gait  Impairments due to: impaired balance, decreased strength, and decreased  endurance  all lines remained intact throughout ambulation trial  Comments: Patient required cues for sequencing, step through gait pattern, stride length, and width of base of support to increase independence and safety. Patient required cues ~ 50% of the time. Mod A for stability through HHA and facilitation for forward weight shift       Education:  Time provided for education, counseling and discussion of health disposition in regards to patient's current status  All questions answered within PT scope of practice and to patient's satisfaction  PT role in POC to address current functional deficits  Pt educated on proper body mechanics, safety techniques, and energy conservation with PT facilitation and cueing throughout session  Whiteboard updated with therapist name and pt's current mobility status documented above  Safe to perform stand pivot transfer to/from chair/bedside commode Mod A and HHA w/ nursing/PCT present  Pt educated on Post-op sternal precautions, including no lifting > 5 lbs, pulling or pushing with BUEs. Able to move arms within a pain free range.    Patient left HOB elevated with all lines intact, call button in reach, and RN notified.    Time Tracking:     PT Received On: 12/16/24  PT Start Time: 1421     PT Stop Time: 1440  PT Total Time (min): 19 min     Billable Minutes:   Gait Training 19 min    Treatment Type: Treatment  PT/PTA: PT       12/16/2024

## 2024-12-17 LAB
ALBUMIN SERPL BCP-MCNC: 2.7 G/DL (ref 3.5–5.2)
ALP SERPL-CCNC: 75 U/L (ref 40–150)
ALT SERPL W/O P-5'-P-CCNC: 10 U/L (ref 10–44)
ANION GAP SERPL CALC-SCNC: 14 MMOL/L (ref 8–16)
AST SERPL-CCNC: 29 U/L (ref 10–40)
BASOPHILS # BLD AUTO: 0.05 K/UL (ref 0–0.2)
BASOPHILS NFR BLD: 0.5 % (ref 0–1.9)
BILIRUB SERPL-MCNC: 0.9 MG/DL (ref 0.1–1)
BUN SERPL-MCNC: 29 MG/DL (ref 8–23)
CALCIUM SERPL-MCNC: 8.8 MG/DL (ref 8.7–10.5)
CHLORIDE SERPL-SCNC: 102 MMOL/L (ref 95–110)
CO2 SERPL-SCNC: 20 MMOL/L (ref 23–29)
CREAT SERPL-MCNC: 1.2 MG/DL (ref 0.5–1.4)
DIFFERENTIAL METHOD BLD: ABNORMAL
EOSINOPHIL # BLD AUTO: 0.2 K/UL (ref 0–0.5)
EOSINOPHIL NFR BLD: 1.7 % (ref 0–8)
ERYTHROCYTE [DISTWIDTH] IN BLOOD BY AUTOMATED COUNT: 12.5 % (ref 11.5–14.5)
EST. GFR  (NO RACE VARIABLE): >60 ML/MIN/1.73 M^2
FINAL PATHOLOGIC DIAGNOSIS: NORMAL
GLUCOSE SERPL-MCNC: 132 MG/DL (ref 70–110)
GROSS: NORMAL
HCT VFR BLD AUTO: 35.5 % (ref 40–54)
HGB BLD-MCNC: 11.7 G/DL (ref 14–18)
IMM GRANULOCYTES # BLD AUTO: 0.08 K/UL (ref 0–0.04)
IMM GRANULOCYTES NFR BLD AUTO: 0.8 % (ref 0–0.5)
LYMPHOCYTES # BLD AUTO: 0.7 K/UL (ref 1–4.8)
LYMPHOCYTES NFR BLD: 6.4 % (ref 18–48)
Lab: NORMAL
MAGNESIUM SERPL-MCNC: 2.2 MG/DL (ref 1.6–2.6)
MCH RBC QN AUTO: 32.4 PG (ref 27–31)
MCHC RBC AUTO-ENTMCNC: 33 G/DL (ref 32–36)
MCV RBC AUTO: 98 FL (ref 82–98)
MICROSCOPIC EXAM: NORMAL
MONOCYTES # BLD AUTO: 1.2 K/UL (ref 0.3–1)
MONOCYTES NFR BLD: 12.2 % (ref 4–15)
NEUTROPHILS # BLD AUTO: 7.9 K/UL (ref 1.8–7.7)
NEUTROPHILS NFR BLD: 78.4 % (ref 38–73)
NRBC BLD-RTO: 0 /100 WBC
PHOSPHATE SERPL-MCNC: 3 MG/DL (ref 2.7–4.5)
PLATELET # BLD AUTO: 145 K/UL (ref 150–450)
PMV BLD AUTO: 10.3 FL (ref 9.2–12.9)
POCT GLUCOSE: 127 MG/DL (ref 70–110)
POCT GLUCOSE: 139 MG/DL (ref 70–110)
POCT GLUCOSE: 170 MG/DL (ref 70–110)
POCT GLUCOSE: 243 MG/DL (ref 70–110)
POCT GLUCOSE: 247 MG/DL (ref 70–110)
POTASSIUM SERPL-SCNC: 4 MMOL/L (ref 3.5–5.1)
PROT SERPL-MCNC: 6.1 G/DL (ref 6–8.4)
RBC # BLD AUTO: 3.61 M/UL (ref 4.6–6.2)
SODIUM SERPL-SCNC: 136 MMOL/L (ref 136–145)
WBC # BLD AUTO: 10.13 K/UL (ref 3.9–12.7)

## 2024-12-17 PROCEDURE — 63600175 PHARM REV CODE 636 W HCPCS

## 2024-12-17 PROCEDURE — 25000003 PHARM REV CODE 250

## 2024-12-17 PROCEDURE — 25000242 PHARM REV CODE 250 ALT 637 W/ HCPCS

## 2024-12-17 PROCEDURE — 25000003 PHARM REV CODE 250: Performed by: THORACIC SURGERY (CARDIOTHORACIC VASCULAR SURGERY)

## 2024-12-17 PROCEDURE — 20600001 HC STEP DOWN PRIVATE ROOM

## 2024-12-17 PROCEDURE — 36415 COLL VENOUS BLD VENIPUNCTURE: CPT

## 2024-12-17 PROCEDURE — 99900035 HC TECH TIME PER 15 MIN (STAT)

## 2024-12-17 PROCEDURE — 94761 N-INVAS EAR/PLS OXIMETRY MLT: CPT

## 2024-12-17 PROCEDURE — 84100 ASSAY OF PHOSPHORUS: CPT

## 2024-12-17 PROCEDURE — 80053 COMPREHEN METABOLIC PANEL: CPT | Performed by: STUDENT IN AN ORGANIZED HEALTH CARE EDUCATION/TRAINING PROGRAM

## 2024-12-17 PROCEDURE — 97535 SELF CARE MNGMENT TRAINING: CPT

## 2024-12-17 PROCEDURE — 83735 ASSAY OF MAGNESIUM: CPT

## 2024-12-17 PROCEDURE — 97116 GAIT TRAINING THERAPY: CPT

## 2024-12-17 PROCEDURE — 25000003 PHARM REV CODE 250: Performed by: STUDENT IN AN ORGANIZED HEALTH CARE EDUCATION/TRAINING PROGRAM

## 2024-12-17 PROCEDURE — 99232 SBSQ HOSP IP/OBS MODERATE 35: CPT | Mod: ,,,

## 2024-12-17 PROCEDURE — 94640 AIRWAY INHALATION TREATMENT: CPT

## 2024-12-17 PROCEDURE — 97530 THERAPEUTIC ACTIVITIES: CPT

## 2024-12-17 PROCEDURE — 85025 COMPLETE CBC W/AUTO DIFF WBC: CPT

## 2024-12-17 RX ORDER — ONDANSETRON 4 MG/1
4 TABLET, FILM COATED ORAL EVERY 6 HOURS PRN
Status: DISCONTINUED | OUTPATIENT
Start: 2024-12-17 | End: 2024-12-17

## 2024-12-17 RX ORDER — INSULIN ASPART 100 [IU]/ML
0-5 INJECTION, SOLUTION INTRAVENOUS; SUBCUTANEOUS
Status: DISCONTINUED | OUTPATIENT
Start: 2024-12-17 | End: 2024-12-18

## 2024-12-17 RX ORDER — FAMOTIDINE 20 MG/1
20 TABLET, FILM COATED ORAL DAILY
Status: DISCONTINUED | OUTPATIENT
Start: 2024-12-18 | End: 2024-12-21 | Stop reason: HOSPADM

## 2024-12-17 RX ORDER — METOPROLOL TARTRATE 25 MG/1
25 TABLET, FILM COATED ORAL 2 TIMES DAILY
Status: DISCONTINUED | OUTPATIENT
Start: 2024-12-17 | End: 2024-12-19

## 2024-12-17 RX ORDER — INSULIN ASPART 100 [IU]/ML
0-5 INJECTION, SOLUTION INTRAVENOUS; SUBCUTANEOUS EVERY 4 HOURS PRN
Status: DISCONTINUED | OUTPATIENT
Start: 2024-12-17 | End: 2024-12-17

## 2024-12-17 RX ORDER — GLUCAGON 1 MG
1 KIT INJECTION
Status: DISCONTINUED | OUTPATIENT
Start: 2024-12-17 | End: 2024-12-21 | Stop reason: HOSPADM

## 2024-12-17 RX ORDER — IBUPROFEN 200 MG
24 TABLET ORAL
Status: DISCONTINUED | OUTPATIENT
Start: 2024-12-17 | End: 2024-12-21 | Stop reason: HOSPADM

## 2024-12-17 RX ORDER — FUROSEMIDE 20 MG/1
20 TABLET ORAL 2 TIMES DAILY
Status: DISCONTINUED | OUTPATIENT
Start: 2024-12-17 | End: 2024-12-21 | Stop reason: HOSPADM

## 2024-12-17 RX ORDER — IBUPROFEN 200 MG
16 TABLET ORAL
Status: DISCONTINUED | OUTPATIENT
Start: 2024-12-17 | End: 2024-12-21 | Stop reason: HOSPADM

## 2024-12-17 RX ADMIN — HEPARIN SODIUM 7500 UNITS: 5000 INJECTION INTRAVENOUS; SUBCUTANEOUS at 08:12

## 2024-12-17 RX ADMIN — POTASSIUM CHLORIDE 20 MEQ: 1500 TABLET, EXTENDED RELEASE ORAL at 08:12

## 2024-12-17 RX ADMIN — ATORVASTATIN CALCIUM 40 MG: 40 TABLET, FILM COATED ORAL at 09:12

## 2024-12-17 RX ADMIN — LEVOTHYROXINE SODIUM 75 MCG: 25 TABLET ORAL at 06:12

## 2024-12-17 RX ADMIN — INSULIN HUMAN 0.8 UNITS/HR: 1 INJECTION, SOLUTION INTRAVENOUS at 01:12

## 2024-12-17 RX ADMIN — IPRATROPIUM BROMIDE AND ALBUTEROL SULFATE 3 ML: 2.5; .5 SOLUTION RESPIRATORY (INHALATION) at 08:12

## 2024-12-17 RX ADMIN — HEPARIN SODIUM 7500 UNITS: 5000 INJECTION INTRAVENOUS; SUBCUTANEOUS at 06:12

## 2024-12-17 RX ADMIN — POTASSIUM CHLORIDE 20 MEQ: 1500 TABLET, EXTENDED RELEASE ORAL at 09:12

## 2024-12-17 RX ADMIN — METOPROLOL TARTRATE 25 MG: 25 TABLET, FILM COATED ORAL at 09:12

## 2024-12-17 RX ADMIN — FAMOTIDINE 20 MG: 10 INJECTION, SOLUTION INTRAVENOUS at 09:12

## 2024-12-17 RX ADMIN — ACETAMINOPHEN 650 MG: 325 TABLET ORAL at 06:12

## 2024-12-17 RX ADMIN — ACETAMINOPHEN 650 MG: 325 TABLET ORAL at 08:12

## 2024-12-17 RX ADMIN — SENNOSIDES AND DOCUSATE SODIUM 1 TABLET: 50; 8.6 TABLET ORAL at 09:12

## 2024-12-17 RX ADMIN — FUROSEMIDE 20 MG: 10 INJECTION, SOLUTION INTRAMUSCULAR; INTRAVENOUS at 09:12

## 2024-12-17 RX ADMIN — IPRATROPIUM BROMIDE AND ALBUTEROL SULFATE 3 ML: 2.5; .5 SOLUTION RESPIRATORY (INHALATION) at 03:12

## 2024-12-17 RX ADMIN — METOPROLOL TARTRATE 25 MG: 25 TABLET, FILM COATED ORAL at 08:12

## 2024-12-17 RX ADMIN — MELATONIN TAB 3 MG 6 MG: 3 TAB at 08:12

## 2024-12-17 RX ADMIN — MUPIROCIN 1 G: 20 OINTMENT TOPICAL at 08:12

## 2024-12-17 RX ADMIN — HEPARIN SODIUM 7500 UNITS: 5000 INJECTION INTRAVENOUS; SUBCUTANEOUS at 03:12

## 2024-12-17 RX ADMIN — FUROSEMIDE 20 MG: 20 TABLET ORAL at 05:12

## 2024-12-17 RX ADMIN — INSULIN ASPART 2 UNITS: 100 INJECTION, SOLUTION INTRAVENOUS; SUBCUTANEOUS at 08:12

## 2024-12-17 RX ADMIN — POLYETHYLENE GLYCOL 3350 17 G: 17 POWDER, FOR SOLUTION ORAL at 08:12

## 2024-12-17 RX ADMIN — ACETAMINOPHEN 650 MG: 325 TABLET ORAL at 03:12

## 2024-12-17 RX ADMIN — ASPIRIN 325 MG ORAL TABLET 325 MG: 325 PILL ORAL at 09:12

## 2024-12-17 RX ADMIN — LIDOCAINE 3 PATCH: 50 PATCH CUTANEOUS at 09:12

## 2024-12-17 RX ADMIN — TAMSULOSIN HYDROCHLORIDE 0.4 MG: 0.4 CAPSULE ORAL at 09:12

## 2024-12-17 RX ADMIN — IPRATROPIUM BROMIDE AND ALBUTEROL SULFATE 3 ML: 2.5; .5 SOLUTION RESPIRATORY (INHALATION) at 12:12

## 2024-12-17 NOTE — ASSESSMENT & PLAN NOTE
79 y.o. male with a PMHx of PE, DVT, HTN, severe MR, HLD, now s/p mitral valve repair on 12/13/24      - Maintain sternal precautions   - ASA  - metoprolol increase to 25 mg   - Statin  - heparin sub q for DVT ppx  - Lasix  with scheduled potassium ordered   - Multimodal pain management   - Encourage ambulation  - PT/OT  - Cardiac diet with 1500 cc fluid restriction   - Bowel regimen in place   - pepcid for ulcer prevention   - Pacer wires intact, plan to remove closer to discharge   - Chest tubes removed   - Bautista remove for voiding trial   - Monitor electrolytes to keep Mag above 2 and Potassium above 4  - OOBTC  - Encourage IS use  - flomax   - duoneb Q4 WA   - echo tomorrow     - DISPO -CSU to rehab, awaiting placement

## 2024-12-17 NOTE — SUBJECTIVE & OBJECTIVE
Interval History: NAEO. AFVSS. Patient has no significant complaints this morning. Denies SOB. Has imbalance when ambulating with therapy and nursing staff. UOP not charted due to patient not urinating in the urinal. V- wires removed. Transitioned all IV medications to PO. Patient is stable to transfer to rehab, awaiting placement.     Review of Systems   Constitutional: Negative for malaise/fatigue.   Cardiovascular:  Negative for chest pain, claudication, dyspnea on exertion, irregular heartbeat, leg swelling and palpitations.   Respiratory:  Negative for cough and shortness of breath.    Hematologic/Lymphatic: Negative for bleeding problem.   Gastrointestinal:  Negative for abdominal pain.   Genitourinary:  Negative for dysuria.   Neurological:  Negative for headaches and weakness.     Medications:  Continuous Infusions:   insulin regular 1 units/mL infusion orderable (DKA)  0.8 Units/hr Intravenous Continuous 0.8 mL/hr at 12/17/24 0124 0.8 Units/hr at 12/17/24 0124     Scheduled Meds:   acetaminophen  650 mg Oral Q8H    albumin human 5%  25 g Intravenous Once    albuterol-ipratropium  3 mL Nebulization Q4H WAKE    aspirin  325 mg Oral Daily    atorvastatin  40 mg Oral Daily    docusate sodium  100 mg Oral BID    [START ON 12/18/2024] famotidine  20 mg Oral Daily    furosemide  20 mg Oral BID    heparin (porcine)  7,500 Units Subcutaneous Q8H    levothyroxine  75 mcg Oral Before breakfast    LIDOcaine  3 patch Transdermal Q24H    melatonin  6 mg Oral Nightly    metoprolol tartrate  25 mg Oral BID    mupirocin  1 g Nasal BID    polyethylene glycol  17 g Oral BID    potassium chloride  20 mEq Oral BID    senna-docusate 8.6-50 mg  1 tablet Oral Daily    tamsulosin  0.4 mg Oral Daily     PRN Meds:  Current Facility-Administered Medications:     albumin human 5%, 25 g, Intravenous, Once PRN    aspirin, 300 mg, Rectal, Once PRN    bisacodyL, 10 mg, Rectal, Daily PRN    dextrose 10%, 12.5 g, Intravenous, PRN    dextrose  10%, 12.5 g, Intravenous, PRN    dextrose 10%, 25 g, Intravenous, PRN    dextrose 10%, 25 g, Intravenous, PRN    dextrose, 16 g, Oral, PRN    dextrose, 16 g, Oral, PRN    dextrose, 24 g, Oral, PRN    glucagon (human recombinant), 1 mg, Intramuscular, PRN    glucose, 16 g, Oral, PRN    glucose, 24 g, Oral, PRN    insulin aspart U-100, 0-5 Units, Subcutaneous, Q4H PRN    metoclopramide, 5 mg, Intravenous, Q6H PRN    ondansetron, 4 mg, Intravenous, Q12H PRN    sodium chloride 0.9%, 10 mL, Intravenous, PRN     Objective:     Vital Signs (Most Recent):  Temp: 97.5 °F (36.4 °C) (12/17/24 0821)  Pulse: 69 (12/17/24 1050)  Resp: 18 (12/17/24 0844)  BP: 104/63 (12/17/24 0821)  SpO2: 98 % (12/17/24 0844) Vital Signs (24h Range):  Temp:  [97.5 °F (36.4 °C)-98.6 °F (37 °C)] 97.5 °F (36.4 °C)  Pulse:  [69-92] 69  Resp:  [16-20] 18  SpO2:  [95 %-98 %] 98 %  BP: (104-120)/(51-64) 104/63     Weight: 78.4 kg (172 lb 13.5 oz)  Body mass index is 27.07 kg/m².    SpO2: 98 %       Intake/Output - Last 3 Shifts         12/15 0700 12/16 0659 12/16 0700 12/17 0659 12/17 0700 12/18 0659    P.O. 480 750     I.V. (mL/kg) 44.3 (0.5)      Total Intake(mL/kg) 524.3 (6.1) 750 (8.8)     Urine (mL/kg/hr) 1330 (0.6) 1350 (0.7)     Stool 0 0     Chest Tube 60      Total Output 1390 1350     Net -865.7 -600            Urine Occurrence 0 x 2 x 1 x    Stool Occurrence 0 x 0 x 1 x            Lines/Drains/Airways       Drain  Duration             Male External Urinary Catheter 12/17/24 0400 Medium <1 day              Line  Duration                  Pacer Wires 12/13/24 1214 3 days              Peripheral Intravenous Line  Duration                  Peripheral IV - Single Lumen 12/17/24 0400 22 G Right Forearm <1 day                     Physical Exam  Constitutional:       Appearance: Normal appearance.   HENT:      Head: Normocephalic.   Eyes:      Pupils: Pupils are equal, round, and reactive to light.   Cardiovascular:      Rate and Rhythm: Normal  rate and regular rhythm.      Pulses: Normal pulses.   Pulmonary:      Effort: Pulmonary effort is normal.   Abdominal:      General: Abdomen is flat. Bowel sounds are normal.      Palpations: Abdomen is soft.   Musculoskeletal:         General: Normal range of motion.   Skin:     General: Skin is warm and dry.      Comments: MSI CDI   Neurological:      General: No focal deficit present.      Mental Status: He is alert.            Significant Labs:  CBC:   Recent Labs   Lab 12/17/24 0424   WBC 10.13   RBC 3.61*   HGB 11.7*   HCT 35.5*   *   MCV 98   MCH 32.4*   MCHC 33.0     CMP:   Recent Labs   Lab 12/17/24 0424   *   CALCIUM 8.8   ALBUMIN 2.7*   PROT 6.1      K 4.0   CO2 20*      BUN 29*   CREATININE 1.2   ALKPHOS 75   ALT 10   AST 29   BILITOT 0.9     Coagulation:   Recent Labs   Lab 12/16/24  0246   INR 1.0   APTT 41.1*     LFTs:   Recent Labs   Lab 12/17/24  0424   ALT 10   AST 29   ALKPHOS 75   BILITOT 0.9   PROT 6.1   ALBUMIN 2.7*       Significant Diagnostics:  I have reviewed all pertinent imaging results/findings within the past 24 hours.

## 2024-12-17 NOTE — SUBJECTIVE & OBJECTIVE
"Interval HPI:   No acute events overnight. Patient in room 330/330 A. Blood glucose stable. BG at goal on current insulin regimen (Transition Insulin Drip). Steroid use- None. 4 Days Post-Op  Renal function- Normal   Lab Results   Component Value Date    CREATININE 1.2 2024     Vasopressors-  None     Endocrine will continue to follow and manage insulin orders inpatient.     Diet Cardiac     Eatin%  Nausea: No  Hypoglycemia and intervention: No  Fever: No  TPN and/or TF: No  If yes, type of TF/TPN and rate: N/A    BP (!) 118/59 (BP Location: Left arm, Patient Position: Lying)   Pulse 76   Temp 97.5 °F (36.4 °C) (Oral)   Resp 17   Ht 5' 7" (1.702 m)   Wt 78.4 kg (172 lb 13.5 oz)   SpO2 98%   BMI 27.07 kg/m²     Labs Reviewed and Include    Recent Labs   Lab 24  0424   *   CALCIUM 8.8   ALBUMIN 2.7*   PROT 6.1      K 4.0   CO2 20*      BUN 29*   CREATININE 1.2   ALKPHOS 75   ALT 10   AST 29   BILITOT 0.9     Lab Results   Component Value Date    WBC 10.13 2024    HGB 11.7 (L) 2024    HCT 35.5 (L) 2024    MCV 98 2024     (L) 2024     No results for input(s): "TSH", "FREET4" in the last 168 hours.  Lab Results   Component Value Date    HGBA1C 6.1 (H) 2024       Nutritional status:   Body mass index is 27.07 kg/m².  Lab Results   Component Value Date    ALBUMIN 2.7 (L) 2024    ALBUMIN 2.5 (L) 2024    ALBUMIN 3.0 (L) 12/15/2024     No results found for: "PREALBUMIN"    Estimated Creatinine Clearance: 45.9 mL/min (based on SCr of 1.2 mg/dL).    Accu-Checks  Recent Labs     24  0039 24  0403 24  1130 24  1224 24  1553 24  0018 24  0349 24  0822 24  1200   POCTGLUCOSE 159* 143* 131* 133* 131* 148* 170* 139* 127* 243*       Current Medications and/or Treatments Impacting Glycemic Control  Immunotherapy:    Immunosuppressants       None          Steroids: "   Hormones (From admission, onward)      Start     Stop Route Frequency Ordered    12/15/24 2100  melatonin tablet 6 mg         -- Oral Nightly 12/15/24 0632          Pressors:    Autonomic Drugs (From admission, onward)      None          Hyperglycemia/Diabetes Medications:   Antihyperglycemics (From admission, onward)      Start     Stop Route Frequency Ordered    12/17/24 1354  insulin aspart U-100 pen 0-5 Units         -- SubQ Before meals, nightly and at 0200 PRN 12/17/24 1255    12/17/24 0215  insulin regular in 0.9 % NaCl 100 unit/100 mL (1 unit/mL) infusion        Question Answer Comment   Insulin Rate Adjustment (DO NOT MODIFY ANSWER) [email or web address]    Enter initial dose from Infusion Protocol Chart (Units/hr): 1        -- IV Continuous 12/17/24 0106

## 2024-12-17 NOTE — PLAN OF CARE
[facility] - Cardiology Stepdown  Initial Discharge Assessment       Primary Care Provider: Silvio Aranda, NP-C    Admission Diagnosis: Mitral regurgitation due to cusp prolapse [I34.0, I34.1]    Admission Date: 12/13/2024  Expected Discharge Date: 12/18/2024    Transition of Care Barriers: None    Payor: [employer] / Plan: [facility] [employer] / Product Type: Government /     Extended Emergency Contact Information  Primary Emergency Contact: Lisseth Delaney  Mobile Phone: [phone]  Relation: Daughter  Preferred language: English   needed? No    Discharge Plan A: Rehab  Discharge Plan B: Skilled Nursing Facility      [facility] - [place], [place] - [address]  [address]  [place] [address]  Phone: [phone] Fax: [phone]      Initial Assessment (most recent)       Adult Discharge Assessment - 12/17/24 1518          Discharge Assessment    Assessment Type Discharge Planning Assessment     Confirmed/corrected address, phone number and insurance Yes     Confirmed Demographics Correct on Facesheet     Source of Information patient     If unable to respond/provide information was family/caregiver contacted? Yes     Contact Name/Number Nini MEJIA (pt. couldn't spell last name but says she is his girlfriend) [phone] or Lisseth Rhett  dtr [phone]     When was your last doctors appointment? 10/24/24     Communicated ANDREW with patient/caregiver Yes     Reason For Admission MVR     People in Home significant other     Facility Arrived From: Home     Do you expect to return to your current living situation? Yes     Do you have help at home or someone to help you manage your care at home? Yes     Who are your caregiver(s) and their phone number(s)? Nini Kay S.OChar [phone]     Prior to hospitilization cognitive status: Alert/Oriented     Current cognitive status: Alert/Oriented     Walking or Climbing Stairs Difficulty yes     Walking or Climbing Stairs ambulation  difficulty, requires equipment;stair climbing difficulty, assistance 1 person     Dressing/Bathing Difficulty no     Do you have any problems with: Errands/Grocery     Home Accessibility not wheelchair accessible     Home Layout Able to live on 1st floor     Equipment Currently Used at Home none     Readmission within 30 days? No     Patient currently being followed by outpatient case management? No     Do you currently have service(s) that help you manage your care at home? No     Do you take prescription medications? Yes     Do you have prescription coverage? Yes     Coverage [facility]     Do you have any problems affording any of your prescribed medications? No     Is the patient taking medications as prescribed? yes     Who is going to help you get home at discharge? S.O. or other family     How do you get to doctors appointments? car, drives self     Are you on dialysis? No     Do you take coumadin? No     Discharge Plan A Rehab     Discharge Plan B Skilled Nursing Facility     Discharge Plan discussed with: Patient;Caregiver     Name(s) and Number(s) Nini Corralchrist     Transition of Care Barriers None        Physical Activity    On average, how many days per week do you engage in moderate to strenuous exercise (like a brisk walk)? 0 days     On average, how many minutes do you engage in exercise at this level? 0 min        Financial Resource Strain    How hard is it for you to pay for the very basics like food, housing, medical care, and heating? Not hard at all        Housing Stability    In the last 12 months, was there a time when you were not able to pay the mortgage or rent on time? No     At any time in the past 12 months, were you homeless or living in a shelter (including now)? No        Transportation Needs    Has the lack of transportation kept you from medical appointments, meetings, work or from getting things needed for daily living? No        Food Insecurity    Within the past 12 months, you worried  that your food would run out before you got the money to buy more. Never true        Stress    Do you feel stress - tense, restless, nervous, or anxious, or unable to sleep at night because your mind is troubled all the time - these days? Only a little        Social Isolation    How often do you feel lonely or isolated from those around you?  Rarely        Alcohol Use    Q1: How often do you have a drink containing alcohol? Never     Q2: How many drinks containing alcohol do you have on a typical day when you are drinking? 1 or 2     Q3: How often do you have six or more drinks on one occasion? Never        Utilities    In the past 12 months has the electric, gas, oil, or water company threatened to shut off services in your home? No        Health Literacy    How often do you need to have someone help you when you read instructions, pamphlets, or other written material from your doctor or pharmacy? Rarely                   Pt is a transfer from SICU. SW reviewed pt chart and met with pt at bedside to complete MILO. Pt also gave consent to SW to call his S.O. who participated in assessment by phone. Pt resides in a 5th wheel, in an rv part in [place], [place].There are 3 -4 steps to enter and 3 steps inside up to the bedroom and bathroom . Pt rv is not wheelchair accessible. The patient has 4 daughters who reside in [place] and one who resides in [place] and is able to provided limited support. Pt primary caregiver is his girlfriend Nini Kay. SW discussed DCP and choiced pt for rehab at discharge. Pt reported that he is 100 % VA and wants to go th rehab that accepts his VA in the [place], [place] area.   SW to send referrals and continue following as needed           Dispo: A. Rehab B. SNF    Discharge Plan A and Plan B have been determined by review of patient's clinical status, future medical and therapeutic needs, and coverage/benefits for post-acute care in coordination with multidisciplinary team members.       María  Tico Guillen MSW  Case management  [id number]

## 2024-12-17 NOTE — PT/OT/SLP PROGRESS
Physical Therapy  co-Treatment with OT    Patient Name:  Wiliam Arreola   MRN:  [id number]    Recommendations:     Discharge Recommendations: High Intensity Therapy  Discharge Equipment Recommendations:  (platform RW?)  Barriers to discharge: Decreased caregiver support    Assessment:     Wiliam Arreola is a 79 y.o. male admitted with a medical diagnosis of S/P MVR (mitral valve repair).  He presents with the following impairments/functional limitations: gait instability, impaired endurance, impaired functional mobility, decreased safety awareness, impaired balance pt tolerated treatment better but cont to be significantly below previous functional level. Pt will benefit from cont skilled PT 5x/wk to progress physically.  Pt is significantly below previous functional level, increased risk of falls and increased burden of care currently. Patient has demonstrated sufficient progression to warrant high intensity therapy evidenced by objectives noted below.  Pt is s/p MVR 12/13/24.    Rehab Prognosis: Good; patient would benefit from acute skilled PT services to address these deficits and reach maximum level of function.    Recent Surgery: Procedure(s) (LRB):  REPAIR, MITRAL VALVE, OPEN (N/A) 4 Days Post-Op    Plan:     During this hospitalization, patient to be seen 5 x/week to address the identified rehab impairments via gait training, therapeutic activities and progress toward the following goals:    Plan of Care Expires:  01/13/25    Subjective     Chief Complaint: pt c/o pain during treatment.   Patient/Family Comments/goals: to get better and go home.   Pain/Comfort:  Pain Rating 1: 8/10 (lower back -chronic)  Pain Addressed 1: Reposition, Distraction  Pain Rating Post-Intervention 1: 7/10 (lower back-chronic)      Objective:     Communicated with nurse  prior to session.  Patient found supine with peripheral IV, telemetry (hep lock IV, camera)  bed alarm upon PT entry to room.     General Precautions:  Standard, fall, sternal  Orthopedic Precautions: N/A  Braces: N/A  Respiratory Status: Room air     Functional Mobility:  Bed Mobility: pt needed verbal cues for functional mobility with sternal precautions and verbal instructions in sternal precautions.     Rolling Left:  minimum assistance  Supine to Sit: minimum assistance    Transfers:     Sit to Stand:  minimum assistance with hand-held assist. Pt stood from bed, toilet and bedside chair.     Gait: pt received gait training ~ 14 ft x 2 reps with moderate assist x 2. Pt had sitting rest period between distances gait trained. Pt received gait training ~ 80 ft with B platform RW and CGA. Pt needed verbal cues to keep head upright with gait and to increase B step lengths.     Due to pt complex medical condition, the skill of 2 licensed therapists is needed to maximize treatment session and progression towards goals  Pt white board updated with current therapists name and level of mobility assistance needed.           AM-PAC 6 CLICK MOBILITY  Turning over in bed (including adjusting bedclothes, sheets and blankets)?: 3  Sitting down on and standing up from a chair with arms (e.g., wheelchair, bedside commode, etc.): 3  Moving from lying on back to sitting on the side of the bed?: 3  Moving to and from a bed to a chair (including a wheelchair)?: 3  Need to walk in hospital room?: 2  Climbing 3-5 steps with a railing?: 1  Basic Mobility Total Score: 15       Treatment & Education:  Pt received verbal instructions in functional mobility with sternal precautions. Pt will need re-instruction.     Patient left up in chair with all lines intact, call button in reach, RN  notified,  and camera in place.     GOALS:   Multidisciplinary Problems       Physical Therapy Goals          Problem: Physical Therapy    Goal Priority Disciplines Outcome Interventions   Physical Therapy Goal     PT, PT/OT Progressing    Description: Goals to be met by: 12/30     Patient will increase  functional independence with mobility by performin. Supine to sit with Modified [place]  2. Sit to supine with Modified [place]  3. Sit to stand transfer with contact assistance  4. Gait  x 400 feet with Modified [place] using approved Assistive Device.   5. Ascend/descend 3 stair with bilateral Handrails Contact Guard Assistance                       Time Tracking:     PT Received On: 24  PT Start Time: 09     PT Stop Time: 1007  PT Total Time (min): 24 min     Billable Minutes: Gait Training 24 min        PT/PTA: PT     Number of PTA visits since last PT visit: 0     2024

## 2024-12-17 NOTE — PT/OT/SLP PROGRESS
Occupational Therapy   Co-Treatment  Co-treatment performed due to patient's multiple deficits requiring two skilled therapists to appropriately and safely assess patient's strength and endurance while facilitating functional tasks in addition to accommodating for patient's activity tolerance.      Name: Wiliam Arreola  MRN: [id number]  Admitting Diagnosis:  S/P MVR (mitral valve repair)  4 Days Post-Op    Recommendations:     Discharge Recommendations: High Intensity Therapy  Discharge Equipment Recommendations:  platform, walker, rolling  Barriers to discharge:  Other (Comment), Inaccessible home environment (increased skilled (A) required)    Assessment:     Wiliam Arreola is a 79 y.o. male with a medical diagnosis of S/P MVR (mitral valve repair).  He presents with the following performance deficits affecting function are weakness, impaired endurance, impaired self care skills, impaired functional mobility, gait instability, impaired balance, decreased coordination, decreased upper extremity function, decreased safety awareness, pain, impaired cardiopulmonary response to activity. Pt with poor safety awareness and adherence to sternal precautions despite ongoing verbal and tactile cueing. Pt remains limited in ADLs, functional mobility, and functional transfers and is currently not performing tasks at [facility] [address] [facility]. Pt would continue to benefit from skilled OT services to maximize functional independence with ADLs and functional mobility, reduce caregiver burden, and facilitate safe discharge in the least restrictive environment. Patient has demonstrated sufficient progression to warrant high intensity therapy evidenced by objectives noted below.     Rehab Prognosis:  Good; patient would benefit from acute skilled OT services to address these deficits and reach maximum level of function.       Plan:     Patient to be seen 5 x/week to address the above listed problems via self-care/home management, therapeutic  activities, therapeutic exercises, neuromuscular re-education  Plan of Care Expires: 01/12/25  Plan of Care Reviewed with: patient    Subjective     Chief Complaint: LBP  Patient/Family Comments/goals: to get better  Pain/Comfort:  Pain Rating 1: 8/10  Location - Orientation 1: lower  Location 1: back  Pain Addressed 1: Reposition, Distraction, Pre-medicate for activity  Pain Rating Post-Intervention 1: 7/10    Objective:   Additional staff present:  Julia PT    Communicated with: RN prior to session.  Patient found HOB elevated with bed alarm, peripheral IV, telemetry, Other (comments) (avasys) upon OT entry to room.    General Precautions: Standard, fall, sternal    Orthopedic Precautions:N/A  Braces: N/A  Respiratory Status: Room air     Occupational Performance:     Bed Mobility:    Patient completed Supine to Sit with minimum assistance     Functional Mobility/Transfers:  Patient completed Sit <> Stand Transfer with minimum assistance  with  no assistive device   From EOB, bedside chair, and toilet  Max verbal and tactile cues for sternal precautions  Patient completed Toilet Transfer Step Transfer technique with minimum assistance with  no AD  Functional Mobility: Pt engaged in functional mobility to simulate household/community distances 14 ft x2 trials with Mod (A) x2 people and no AD + 80 ft with CGA and platform RW in order to maximize functional endurance and standing balance required for engagement in occupations of choice   No overt LOB, unsteadiness noted t/o  Mild SOB  2 sitting rest breaks  Manual (A) intermittently required for platform RW management    Activities of Daily Living:  Grooming: stand by assistance to perform oral and face care seated on toilet  Upper Body Dressing: minimum assistance to don hospital gown over UE 2/2 IV line  Lower Body Dressing: maximal assistance required to don hospital socks edge of bed; pt able to pull L sock above heel with forward flexed posture  Toileting:  moderate assistance for functional balance/endurance as well as to stabilize urinal to void in standing over toilet      [facility] 6 Click ADL: 15    Treatment & Education:  -Sternal precautions and application to functional tasks / ADLs reviewed:  including no lifting > 5 lbs, pulling or pushing with B UEs; Modifying daily activities and functional mobility tasks to maintain sternal precautions appropriately; Importance of participation in therapy and engaging in increased OOB mobility with assistance to improve endurance   -Pt educated to call for assistance and to transfer with hospital staff only  -Provided education regarding role of OT, POC, & discharge recommendations with pt verbalizing understanding.  Pt had no further questions & when asked whether there were any concerns pt reported none.     Patient left up in chair with all lines intact, call button in reach, and RN notified    GOALS:   Multidisciplinary Problems       Occupational Therapy Goals          Problem: Occupational Therapy    Goal Priority Disciplines Outcome Interventions   Occupational Therapy Goal     OT, PT/OT Progressing    Description: Goals to be met by: 12/28/2024     Patient will increase functional independence with ADLs by performing:    UE Dressing with Set-up Assistance.  LE Dressing with Stand-by Assistance.  Grooming while standing at sink with Stand-by Assistance.  Toileting from toilet with Stand-by Assistance for hygiene and clothing management.   Supine to sit with Stand-by Assistance.  Step transfer with Stand-by Assistance.  Toilet transfer to toilet with Stand-by Assistance.                         Time Tracking:     OT Date of Treatment: 12/17/24  OT Start Time: 0943  OT Stop Time: 1007  OT Total Time (min): 24 min    Billable Minutes:Self Care/Home Management 14  Therapeutic Activity 10    OT/THERON: OT          12/17/2024

## 2024-12-17 NOTE — PLAN OF CARE
Problem: Physical Therapy  Goal: Physical Therapy Goal  Description: Goals to be met by:      Patient will increase functional independence with mobility by performin. Supine to sit with Modified [place]  2. Sit to supine with Modified [place]  3. Sit to stand transfer with contact assistance  4. Gait  x 400 feet with Modified [place] using approved Assistive Device.   5. Ascend/descend 3 stair with bilateral Handrails Contact Guard Assistance  Outcome: Progressing   Goals updated for content and are appropriate. 2024

## 2024-12-17 NOTE — PROGRESS NOTES
"[facility]  Endocrinology  Progress Note    Admit Date: 2024     Reason for Consult: Management of prediabetic, Hyperglycemia     Surgical Procedure and Date: s/p MVR on 2024    Patient is not diabetic and does not currently take any oral/injectable antidiabetic/hypoglycemic medications.         HPI: Wiliam Arreola is a 79 y.o. male with a PMHx of PE, DVT, HTN, severe MR, HLD, who presents to the SICU s/p mitral valve repair with Dr. Gates. On admission, he is intubated, sedated with propofol, and in stable condition. Endocrine consulted to manage hyperglycemia and pre-diabetes in the context of CTS.     Lab Results   Component Value Date    HGBA1C 6.1 (H) 2024           Interval HPI:   No acute events overnight. Patient in room 330/330 A. Blood glucose stable. BG at goal on current insulin regimen (Transition Insulin Drip). Steroid use- None. 4 Days Post-Op  Renal function- Normal   Lab Results   Component Value Date    CREATININE 1.2 2024     Vasopressors-  None     Endocrine will continue to follow and manage insulin orders inpatient.     Diet Cardiac     Eatin%  Nausea: No  Hypoglycemia and intervention: No  Fever: No  TPN and/or TF: No  If yes, type of TF/TPN and rate: N/A    BP (!) 118/59 (BP Location: Left arm, Patient Position: Lying)   Pulse 76   Temp 97.5 °F (36.4 °C) (Oral)   Resp 17   Ht 5' 7" (1.702 m)   Wt 78.4 kg (172 lb 13.5 oz)   SpO2 98%   BMI 27.07 kg/m²     Labs Reviewed and Include    Recent Labs   Lab 24  0424   *   CALCIUM 8.8   ALBUMIN 2.7*   PROT 6.1      K 4.0   CO2 20*      BUN 29*   CREATININE 1.2   ALKPHOS 75   ALT 10   AST 29   BILITOT 0.9     Lab Results   Component Value Date    WBC 10.13 2024    HGB 11.7 (L) 2024    HCT 35.5 (L) 2024    MCV 98 2024     (L) 2024     No results for input(s): "TSH", "FREET4" in the last 168 hours.  Lab Results   Component Value " "Date    HGBA1C 6.1 (H) 12/12/2024       Nutritional status:   Body mass index is 27.07 kg/m².  Lab Results   Component Value Date    ALBUMIN 2.7 (L) 12/17/2024    ALBUMIN 2.5 (L) 12/16/2024    ALBUMIN 3.0 (L) 12/15/2024     No results found for: "PREALBUMIN"    Estimated Creatinine Clearance: 45.9 mL/min (based on SCr of 1.2 mg/dL).    Accu-Checks  Recent Labs     12/16/24  0039 12/16/24  0403 12/16/24  1130 12/16/24  1224 12/16/24  1553 12/16/24  2015 12/17/24  0018 12/17/24  0349 12/17/24  0822 12/17/24  1200   POCTGLUCOSE 159* 143* 131* 133* 131* 148* 170* 139* 127* 243*       Current Medications and/or Treatments Impacting Glycemic Control  Immunotherapy:    Immunosuppressants       None          Steroids:   Hormones (From admission, onward)      Start     Stop Route Frequency Ordered    12/15/24 2100  melatonin tablet 6 mg         -- Oral Nightly 12/15/24 0632          Pressors:    Autonomic Drugs (From admission, onward)      None          Hyperglycemia/Diabetes Medications:   Antihyperglycemics (From admission, onward)      Start     Stop Route Frequency Ordered    12/17/24 1354  insulin aspart U-100 pen 0-5 Units         -- SubQ Before meals, nightly and at 0200 PRN 12/17/24 1255    12/17/24 0215  insulin regular in 0.9 % NaCl 100 unit/100 mL (1 unit/mL) infusion        Question Answer Comment   Insulin Rate Adjustment (DO NOT MODIFY ANSWER) [email or web address]    Enter initial dose from Infusion Protocol Chart (Units/hr): 1        -- IV Continuous 12/17/24 0106            ASSESSMENT and PLAN    Cardiac/Vascular  Rupture of chordae tendineae  S/p MVR  Optimize BG control to improve wound healing        Mitral regurgitation due to cusp prolapse  Managed per primary team  Avoid hypoglycemia        Endocrine  Prediabetes  Endocrinology consulted for BG management.   BG goal 110-140 CTS    - Transition drip at 0.8 units/hr with step-down parameters.   - " Novolog (aspart) insulin prn for BG excursions [facility] SSI (150/50).  - BG checks /HS/0200  - Hypoglycemia protocol in place    ** Please notify Endocrine for any change and/or advance in diet**  ** Please call Endocrine for any BG related issues **    Discharge Planning:   TBD. Please notify endocrinology prior to discharge.            Danita Elise PA-C  Endocrinology  [facility]

## 2024-12-17 NOTE — PROGRESS NOTES
[facility]  Cardiothoracic Surgery  Progress Note    Patient Name: Wiliam Arreola  MRN: [id number]  Admission Date: 12/13/2024  Hospital Length of Stay: 4 days  Code Status: Prior   Attending Physician: Babatunde Gates MD   Referring Provider: Babatunde Gates MD  Principal Problem:S/P MVR (mitral valve repair)            Subjective:     Post-Op Info:  Procedure(s) (LRB):  REPAIR, MITRAL VALVE, OPEN (N/A)   4 Days Post-Op     Interval History: NAEO. AFVSS. Patient has no significant complaints this morning. Denies SOB. Has imbalance when ambulating with therapy and nursing staff. UOP not charted due to patient not urinating in the urinal. V- wires removed. Transitioned all IV medications to PO. Patient is stable to transfer to rehab, awaiting placement.     Review of Systems   Constitutional: Negative for malaise/fatigue.   Cardiovascular:  Negative for chest pain, claudication, dyspnea on exertion, irregular heartbeat, leg swelling and palpitations.   Respiratory:  Negative for cough and shortness of breath.    Hematologic/Lymphatic: Negative for bleeding problem.   Gastrointestinal:  Negative for abdominal pain.   Genitourinary:  Negative for dysuria.   Neurological:  Negative for headaches and weakness.     Medications:  Continuous Infusions:   insulin regular 1 units/mL infusion orderable (DKA)  0.8 Units/hr Intravenous Continuous 0.8 mL/hr at 12/17/24 0124 0.8 Units/hr at 12/17/24 0124     Scheduled Meds:   acetaminophen  650 mg Oral Q8H    albumin human 5%  25 g Intravenous Once    albuterol-ipratropium  3 mL Nebulization Q4H WAKE    aspirin  325 mg Oral Daily    atorvastatin  40 mg Oral Daily    docusate sodium  100 mg Oral BID    [START ON 12/18/2024] famotidine  20 mg Oral Daily    furosemide  20 mg Oral BID    heparin (porcine)  7,500 Units Subcutaneous Q8H    levothyroxine  75 mcg Oral Before breakfast    LIDOcaine  3 patch Transdermal Q24H    melatonin  6 mg Oral Nightly     metoprolol tartrate  25 mg Oral BID    mupirocin  1 g Nasal BID    polyethylene glycol  17 g Oral BID    potassium chloride  20 mEq Oral BID    senna-docusate 8.6-50 mg  1 tablet Oral Daily    tamsulosin  0.4 mg Oral Daily     PRN Meds:  Current Facility-Administered Medications:     albumin human 5%, 25 g, Intravenous, Once PRN    aspirin, 300 mg, Rectal, Once PRN    bisacodyL, 10 mg, Rectal, Daily PRN    dextrose 10%, 12.5 g, Intravenous, PRN    dextrose 10%, 12.5 g, Intravenous, PRN    dextrose 10%, 25 g, Intravenous, PRN    dextrose 10%, 25 g, Intravenous, PRN    dextrose, 16 g, Oral, PRN    dextrose, 16 g, Oral, PRN    dextrose, 24 g, Oral, PRN    glucagon (human recombinant), 1 mg, Intramuscular, PRN    glucose, 16 g, Oral, PRN    glucose, 24 g, Oral, PRN    insulin aspart U-100, 0-5 Units, Subcutaneous, Q4H PRN    metoclopramide, 5 mg, Intravenous, Q6H PRN    ondansetron, 4 mg, Intravenous, Q12H PRN    sodium chloride 0.9%, 10 mL, Intravenous, PRN     Objective:     Vital Signs (Most Recent):  Temp: 97.5 °F (36.4 °C) (12/17/24 0821)  Pulse: 69 (12/17/24 1050)  Resp: 18 (12/17/24 0844)  BP: 104/63 (12/17/24 0821)  SpO2: 98 % (12/17/24 0844) Vital Signs (24h Range):  Temp:  [97.5 °F (36.4 °C)-98.6 °F (37 °C)] 97.5 °F (36.4 °C)  Pulse:  [69-92] 69  Resp:  [16-20] 18  SpO2:  [95 %-98 %] 98 %  BP: (104-120)/(51-64) 104/63     Weight: 78.4 kg (172 lb 13.5 oz)  Body mass index is 27.07 kg/m².    SpO2: 98 %       Intake/Output - Last 3 Shifts         12/15 0700  12/16 0659 12/16 0700  12/17 0659 12/17 0700 12/18 0659    P.O. 480 750     I.V. (mL/kg) 44.3 (0.5)      Total Intake(mL/kg) 524.3 (6.1) 750 (8.8)     Urine (mL/kg/hr) 1330 (0.6) 1350 (0.7)     Stool 0 0     Chest Tube 60      Total Output 1390 1350     Net -865.7 -600            Urine Occurrence 0 x 2 x 1 x    Stool Occurrence 0 x 0 x 1 x            Lines/Drains/Airways       Drain  Duration             Male External Urinary Catheter 12/17/24 0400 Medium <1  day              Line  Duration                  Pacer Wires 12/13/24 1214 3 days              Peripheral Intravenous Line  Duration                  Peripheral IV - Single Lumen 12/17/24 0400 22 G Right Forearm <1 day                     Physical Exam  Constitutional:       Appearance: Normal appearance.   HENT:      Head: Normocephalic.   Eyes:      Pupils: Pupils are equal, round, and reactive to light.   Cardiovascular:      Rate and Rhythm: Normal rate and regular rhythm.      Pulses: Normal pulses.   Pulmonary:      Effort: Pulmonary effort is normal.   Abdominal:      General: Abdomen is flat. Bowel sounds are normal.      Palpations: Abdomen is soft.   Musculoskeletal:         General: Normal range of motion.   Skin:     General: Skin is warm and dry.      Comments: MSI CDI   Neurological:      General: No focal deficit present.      Mental Status: He is alert.            Significant Labs:  CBC:   Recent Labs   Lab 12/17/24 0424   WBC 10.13   RBC 3.61*   HGB 11.7*   HCT 35.5*   *   MCV 98   MCH 32.4*   MCHC 33.0     CMP:   Recent Labs   Lab 12/17/24 0424   *   CALCIUM 8.8   ALBUMIN 2.7*   PROT 6.1      K 4.0   CO2 20*      BUN 29*   CREATININE 1.2   ALKPHOS 75   ALT 10   AST 29   BILITOT 0.9     Coagulation:   Recent Labs   Lab 12/16/24  0246   INR 1.0   APTT 41.1*     LFTs:   Recent Labs   Lab 12/17/24 0424   ALT 10   AST 29   ALKPHOS 75   BILITOT 0.9   PROT 6.1   ALBUMIN 2.7*       Significant Diagnostics:  I have reviewed all pertinent imaging results/findings within the past 24 hours.  Assessment/Plan:     * S/P MVR (mitral valve repair)  79 y.o. male with a PMHx of PE, DVT, HTN, severe MR, HLD, now s/p mitral valve repair on 12/13/24      - Maintain sternal precautions   - ASA  - metoprolol increase to 25 mg   - Statin  - heparin sub q for DVT ppx  - Lasix  with scheduled potassium ordered   - Multimodal pain management   - Encourage ambulation  - PT/OT  - Cardiac diet with  1500 cc fluid restriction   - Bowel regimen in place   - pepcid for ulcer prevention   - Pacer wires intact, plan to remove closer to discharge   - Chest tubes removed   - Bautista remove for voiding trial   - Monitor electrolytes to keep Mag above 2 and Potassium above 4  - OOBTC  - Encourage IS use  - flomax   - duoneb Q4 WA   - echo tomorrow     - DISPO -CSU to rehab, awaiting placement          Hypophosphatemia  Scheduled and PRN replacement   Lab daily to monitor trends    Thrombocytopenia  Post op expected   CBC daily to monitor trends     Acute blood loss anemia  - Can be expected postoperatively   - CBC daily to monitor trends   - No need for replacement today        Prediabetes  Endocrine following     Rupture of chordae tendineae  See s/p MVr     Hypothyroidism  Continue home levothyroxine     HTN (hypertension)  Nifedipine 60 PO daily - hold     HLD (hyperlipidemia)  Statin         Yolanda Cueto PASamiraC  Cardiothoracic Surgery  [facility]

## 2024-12-17 NOTE — ASSESSMENT & PLAN NOTE
Endocrinology consulted for BG management.   BG goal 110-140 CTS    - Transition drip at 0.8 units/hr with step-down parameters.   - Novolog (aspart) insulin prn for BG excursions [facility] SSI (150/50).  - BG checks /HS/0200  - Hypoglycemia protocol in place    ** Please notify Endocrine for any change and/or advance in diet**  ** Please call Endocrine for any BG related issues **    Discharge Planning:   TBD. Please notify endocrinology prior to discharge.

## 2024-12-18 PROBLEM — R53.81 DEBILITY: Status: ACTIVE | Noted: 2024-12-18

## 2024-12-18 PROBLEM — E87.6 HYPOKALEMIA: Status: ACTIVE | Noted: 2024-12-18

## 2024-12-18 LAB
ANION GAP SERPL CALC-SCNC: 10 MMOL/L (ref 8–16)
BASOPHILS # BLD AUTO: 0.06 K/UL (ref 0–0.2)
BASOPHILS NFR BLD: 0.6 % (ref 0–1.9)
BUN SERPL-MCNC: 25 MG/DL (ref 8–23)
CALCIUM SERPL-MCNC: 8.5 MG/DL (ref 8.7–10.5)
CHLORIDE SERPL-SCNC: 104 MMOL/L (ref 95–110)
CO2 SERPL-SCNC: 22 MMOL/L (ref 23–29)
CREAT SERPL-MCNC: 1.1 MG/DL (ref 0.5–1.4)
DIFFERENTIAL METHOD BLD: ABNORMAL
EOSINOPHIL # BLD AUTO: 0.4 K/UL (ref 0–0.5)
EOSINOPHIL NFR BLD: 4 % (ref 0–8)
ERYTHROCYTE [DISTWIDTH] IN BLOOD BY AUTOMATED COUNT: 12.4 % (ref 11.5–14.5)
EST. GFR  (NO RACE VARIABLE): >60 ML/MIN/1.73 M^2
GLUCOSE SERPL-MCNC: 106 MG/DL (ref 70–110)
HCT VFR BLD AUTO: 37.2 % (ref 40–54)
HGB BLD-MCNC: 12.8 G/DL (ref 14–18)
IMM GRANULOCYTES # BLD AUTO: 0.07 K/UL (ref 0–0.04)
IMM GRANULOCYTES NFR BLD AUTO: 0.8 % (ref 0–0.5)
LYMPHOCYTES # BLD AUTO: 0.9 K/UL (ref 1–4.8)
LYMPHOCYTES NFR BLD: 9.7 % (ref 18–48)
MAGNESIUM SERPL-MCNC: 2.3 MG/DL (ref 1.6–2.6)
MCH RBC QN AUTO: 33.7 PG (ref 27–31)
MCHC RBC AUTO-ENTMCNC: 34.4 G/DL (ref 32–36)
MCV RBC AUTO: 98 FL (ref 82–98)
MONOCYTES # BLD AUTO: 1.2 K/UL (ref 0.3–1)
MONOCYTES NFR BLD: 12.4 % (ref 4–15)
NEUTROPHILS # BLD AUTO: 6.7 K/UL (ref 1.8–7.7)
NEUTROPHILS NFR BLD: 72.5 % (ref 38–73)
NRBC BLD-RTO: 0 /100 WBC
PHOSPHATE SERPL-MCNC: 2.7 MG/DL (ref 2.7–4.5)
PLATELET # BLD AUTO: 182 K/UL (ref 150–450)
PMV BLD AUTO: 9.8 FL (ref 9.2–12.9)
POCT GLUCOSE: 183 MG/DL (ref 70–110)
POCT GLUCOSE: 207 MG/DL (ref 70–110)
POTASSIUM SERPL-SCNC: 3.6 MMOL/L (ref 3.5–5.1)
RBC # BLD AUTO: 3.8 M/UL (ref 4.6–6.2)
SODIUM SERPL-SCNC: 136 MMOL/L (ref 136–145)
WBC # BLD AUTO: 9.24 K/UL (ref 3.9–12.7)

## 2024-12-18 PROCEDURE — 94761 N-INVAS EAR/PLS OXIMETRY MLT: CPT

## 2024-12-18 PROCEDURE — 25000003 PHARM REV CODE 250

## 2024-12-18 PROCEDURE — 83735 ASSAY OF MAGNESIUM: CPT

## 2024-12-18 PROCEDURE — 97116 GAIT TRAINING THERAPY: CPT

## 2024-12-18 PROCEDURE — 25000242 PHARM REV CODE 250 ALT 637 W/ HCPCS

## 2024-12-18 PROCEDURE — 99232 SBSQ HOSP IP/OBS MODERATE 35: CPT | Mod: ,,,

## 2024-12-18 PROCEDURE — 85025 COMPLETE CBC W/AUTO DIFF WBC: CPT

## 2024-12-18 PROCEDURE — 80048 BASIC METABOLIC PNL TOTAL CA: CPT

## 2024-12-18 PROCEDURE — 63600175 PHARM REV CODE 636 W HCPCS

## 2024-12-18 PROCEDURE — 20600001 HC STEP DOWN PRIVATE ROOM

## 2024-12-18 PROCEDURE — 94640 AIRWAY INHALATION TREATMENT: CPT

## 2024-12-18 PROCEDURE — 97530 THERAPEUTIC ACTIVITIES: CPT

## 2024-12-18 PROCEDURE — 25000003 PHARM REV CODE 250: Performed by: STUDENT IN AN ORGANIZED HEALTH CARE EDUCATION/TRAINING PROGRAM

## 2024-12-18 PROCEDURE — 84100 ASSAY OF PHOSPHORUS: CPT

## 2024-12-18 RX ORDER — POTASSIUM CHLORIDE 20 MEQ/1
20 TABLET, EXTENDED RELEASE ORAL ONCE
Status: COMPLETED | OUTPATIENT
Start: 2024-12-18 | End: 2024-12-18

## 2024-12-18 RX ORDER — INSULIN ASPART 100 [IU]/ML
0-5 INJECTION, SOLUTION INTRAVENOUS; SUBCUTANEOUS
Status: DISCONTINUED | OUTPATIENT
Start: 2024-12-18 | End: 2024-12-21 | Stop reason: HOSPADM

## 2024-12-18 RX ADMIN — INSULIN ASPART 1 UNITS: 100 INJECTION, SOLUTION INTRAVENOUS; SUBCUTANEOUS at 05:12

## 2024-12-18 RX ADMIN — POTASSIUM CHLORIDE 20 MEQ: 1500 TABLET, EXTENDED RELEASE ORAL at 08:12

## 2024-12-18 RX ADMIN — DOCUSATE SODIUM 100 MG: 100 CAPSULE, LIQUID FILLED ORAL at 08:12

## 2024-12-18 RX ADMIN — ASPIRIN 325 MG ORAL TABLET 325 MG: 325 PILL ORAL at 08:12

## 2024-12-18 RX ADMIN — METOPROLOL TARTRATE 25 MG: 25 TABLET, FILM COATED ORAL at 08:12

## 2024-12-18 RX ADMIN — HEPARIN SODIUM 7500 UNITS: 5000 INJECTION INTRAVENOUS; SUBCUTANEOUS at 02:12

## 2024-12-18 RX ADMIN — LEVOTHYROXINE SODIUM 75 MCG: 25 TABLET ORAL at 06:12

## 2024-12-18 RX ADMIN — LIDOCAINE 3 PATCH: 50 PATCH CUTANEOUS at 08:12

## 2024-12-18 RX ADMIN — IPRATROPIUM BROMIDE AND ALBUTEROL SULFATE 3 ML: 2.5; .5 SOLUTION RESPIRATORY (INHALATION) at 03:12

## 2024-12-18 RX ADMIN — FUROSEMIDE 20 MG: 20 TABLET ORAL at 08:12

## 2024-12-18 RX ADMIN — HEPARIN SODIUM 7500 UNITS: 5000 INJECTION INTRAVENOUS; SUBCUTANEOUS at 08:12

## 2024-12-18 RX ADMIN — MELATONIN TAB 3 MG 6 MG: 3 TAB at 08:12

## 2024-12-18 RX ADMIN — FAMOTIDINE 20 MG: 20 TABLET ORAL at 08:12

## 2024-12-18 RX ADMIN — ACETAMINOPHEN 650 MG: 325 TABLET ORAL at 06:12

## 2024-12-18 RX ADMIN — FUROSEMIDE 20 MG: 20 TABLET ORAL at 05:12

## 2024-12-18 RX ADMIN — IPRATROPIUM BROMIDE AND ALBUTEROL SULFATE 3 ML: 2.5; .5 SOLUTION RESPIRATORY (INHALATION) at 08:12

## 2024-12-18 RX ADMIN — TAMSULOSIN HYDROCHLORIDE 0.4 MG: 0.4 CAPSULE ORAL at 08:12

## 2024-12-18 RX ADMIN — ATORVASTATIN CALCIUM 40 MG: 40 TABLET, FILM COATED ORAL at 08:12

## 2024-12-18 RX ADMIN — HEPARIN SODIUM 7500 UNITS: 5000 INJECTION INTRAVENOUS; SUBCUTANEOUS at 06:12

## 2024-12-18 RX ADMIN — POLYETHYLENE GLYCOL 3350 17 G: 17 POWDER, FOR SOLUTION ORAL at 08:12

## 2024-12-18 RX ADMIN — ACETAMINOPHEN 650 MG: 325 TABLET ORAL at 02:12

## 2024-12-18 RX ADMIN — ACETAMINOPHEN 650 MG: 325 TABLET ORAL at 08:12

## 2024-12-18 RX ADMIN — IPRATROPIUM BROMIDE AND ALBUTEROL SULFATE 3 ML: 2.5; .5 SOLUTION RESPIRATORY (INHALATION) at 12:12

## 2024-12-18 NOTE — SUBJECTIVE & OBJECTIVE
Interval History: no acute changes. patient would like to walk more. Ok to use platform walker with nursing staff. Awaiting placement.    Review of Systems   Constitutional: Negative for malaise/fatigue.   Cardiovascular:  Negative for chest pain, claudication, dyspnea on exertion, irregular heartbeat, leg swelling and palpitations.   Respiratory:  Negative for cough and shortness of breath.    Hematologic/Lymphatic: Negative for bleeding problem.   Gastrointestinal:  Negative for abdominal pain.   Genitourinary:  Negative for dysuria.   Neurological:  Negative for headaches and weakness.     Medications:  Continuous Infusions:  Scheduled Meds:   acetaminophen  650 mg Oral Q8H    albuterol-ipratropium  3 mL Nebulization Q4H WAKE    aspirin  325 mg Oral Daily    atorvastatin  40 mg Oral Daily    docusate sodium  100 mg Oral BID    famotidine  20 mg Oral Daily    furosemide  20 mg Oral BID    heparin (porcine)  7,500 Units Subcutaneous Q8H    levothyroxine  75 mcg Oral Before breakfast    LIDOcaine  3 patch Transdermal Q24H    melatonin  6 mg Oral Nightly    metoprolol tartrate  25 mg Oral BID    polyethylene glycol  17 g Oral BID    potassium chloride  20 mEq Oral BID    senna-docusate 8.6-50 mg  1 tablet Oral Daily    tamsulosin  0.4 mg Oral Daily     PRN Meds:  Current Facility-Administered Medications:     albumin human 5%, 25 g, Intravenous, Once PRN    aspirin, 300 mg, Rectal, Once PRN    bisacodyL, 10 mg, Rectal, Daily PRN    dextrose 10%, 12.5 g, Intravenous, PRN    dextrose 10%, 12.5 g, Intravenous, PRN    dextrose 10%, 25 g, Intravenous, PRN    dextrose 10%, 25 g, Intravenous, PRN    dextrose, 16 g, Oral, PRN    dextrose, 16 g, Oral, PRN    dextrose, 24 g, Oral, PRN    glucagon (human recombinant), 1 mg, Intramuscular, PRN    glucose, 16 g, Oral, PRN    glucose, 24 g, Oral, PRN    insulin aspart U-100, 0-5 Units, Subcutaneous, QID (AC + HS) PRN    sodium chloride 0.9%, 10 mL, Intravenous, PRN     Objective:      Vital Signs (Most Recent):  Temp: 98 °F (36.7 °C) (12/18/24 0716)  Pulse: 87 (12/18/24 0820)  Resp: 18 (12/18/24 0806)  BP: 134/71 (12/18/24 0716)  SpO2: 96 % (12/18/24 0806) Vital Signs (24h Range):  Temp:  [96.9 °F (36.1 °C)-98 °F (36.7 °C)] 98 °F (36.7 °C)  Pulse:  [66-95] 87  Resp:  [16-22] 18  SpO2:  [94 %-98 %] 96 %  BP: (118-134)/(59-71) 134/71     Weight: 76.7 kg (169 lb 1.5 oz)  Body mass index is 26.48 kg/m².    SpO2: 96 %       Intake/Output - Last 3 Shifts         12/16 0700  12/17 0659 12/17 0700  12/18 0659 12/18 0700 12/19 0659    P.O. 750 582     I.V. (mL/kg)       Total Intake(mL/kg) 750 (8.8) 582 (7.4)     Urine (mL/kg/hr) 1350 (0.7) 775 (0.4)     Stool 0 0     Chest Tube       Total Output 1350 775     Net -600 -193            Urine Occurrence 2 x 4 x     Stool Occurrence 0 x 1 x             Lines/Drains/Airways       Drain  Duration             Male External Urinary Catheter 12/17/24 2000 <1 day              Line  Duration                  Pacer Wires 12/13/24 1214 4 days              Peripheral Intravenous Line  Duration                  Peripheral IV - Single Lumen 12/17/24 0400 22 G Right Forearm 1 day                     Physical Exam  Constitutional:       Appearance: Normal appearance.   HENT:      Head: Normocephalic.   Eyes:      Pupils: Pupils are equal, round, and reactive to light.   Cardiovascular:      Rate and Rhythm: Normal rate and regular rhythm.      Pulses: Normal pulses.   Pulmonary:      Effort: Pulmonary effort is normal.   Abdominal:      General: Abdomen is flat. Bowel sounds are normal.      Palpations: Abdomen is soft.   Musculoskeletal:         General: Normal range of motion.   Skin:     General: Skin is warm and dry.      Comments: MSI CDI   Neurological:      General: No focal deficit present.      Mental Status: He is alert.            Significant Labs:  CBC:   Recent Labs   Lab 12/18/24  0524   WBC 9.24   RBC 3.80*   HGB 12.8*   HCT 37.2*      MCV 98  "  MCH 33.7*   MCHC 34.4     CMP:   Recent Labs   Lab 12/17/24  0424 12/18/24  0524   * 106   CALCIUM 8.8 8.5*   ALBUMIN 2.7*  --    PROT 6.1  --     136   K 4.0 3.6   CO2 20* 22*    104   BUN 29* 25*   CREATININE 1.2 1.1   ALKPHOS 75  --    ALT 10  --    AST 29  --    BILITOT 0.9  --      Coagulation: No results for input(s): "PT", "INR", "APTT" in the last 48 hours.  LFTs:   Recent Labs   Lab 12/17/24  0424   ALT 10   AST 29   ALKPHOS 75   BILITOT 0.9   PROT 6.1   ALBUMIN 2.7*       Significant Diagnostics:  I have reviewed all pertinent imaging results/findings within the past 24 hours.  "

## 2024-12-18 NOTE — PT/OT/SLP PROGRESS
Physical Therapy Treatment    Patient Name:  Wiliam Arreola   MRN:  [id number]  Admitting Diagnosis:  S/P MVR (mitral valve repair)   Recent Surgery: Procedure(s) (LRB):  REPAIR, MITRAL VALVE, OPEN (N/A) 5 Days Post-Op  Admit Date: 12/13/2024  Length of Stay: 5 days    Recommendations:     Discharge Recommendations:  High Intensity Therapy  Discharge Equipment Recommendations: platform, walker, rolling   Justification for Equipment: The mobility limitation cannot be sufficiently resolved by the use of a cane. Patient's functional mobility deficit can be sufficiently resolved with the use of a Platform Rolling Walker. Patient's mobility limitation significantly impairs their ability to participate in one of more activities of daily living. The use of a Platform Rolling Walker will significantly improve the patient's ability to participate in MRADLS and the patient will use it on regular basis in the home.   Barriers to discharge: Evolving Clinical Presentation    Assessment:     Wiliam Arreola is a 79 y.o. male admitted with a medical diagnosis of S/P MVR (mitral valve repair).  He presents with the following impairments/functional limitations:  impaired endurance, impaired self care skills, weakness, impaired functional mobility, gait instability, orthopedic precautions, pain, impaired balance, decreased safety awareness, decreased lower extremity function.     Pt agreeable to therapy, reported to be eager to get up all day. Pt with improvement in gait distance today, similar level of assistance needed for all functional mobility.    Rehab Prognosis: Good; patient would benefit from acute skilled PT services to address these deficits and reach maximum level of function.    Recent Surgery: Procedure(s) (LRB):  REPAIR, MITRAL VALVE, OPEN (N/A) 5 Days Post-Op    Treatment Tolerated: Fairly Well    Highest level of mobility achieved this visit: ambulates 140ft w/ platform walker and CGA    Activity with RN/PCT:  "transfer with one person assist    Plan:     During this hospitalization, patient to be seen 5 x/week to address the identified rehab impairments via gait training, therapeutic activities, therapeutic exercises, neuromuscular re-education and progress toward the following goals:    Plan of Care Expires:  01/13/25    Subjective     YUSUF Armenta notified prior to session. No family present upon PT entrance into room.    Chief Complaint: tired of being in bed  Patient/Family Comments/goals: "About time you showed up"  Pain/Comfort:  Pain Rating 1: 0/10      Objective:     Additional staff present: none    Patient found supine with: bed alarm, telemetry (Telesitter)   Cognition:   Alert and Cooperative  Command following: Follows two-step verbal commands  Fluency: clear/fluent  General Precautions: Standard, fall, sternal   Orthopedic Precautions:N/A   Braces: N/A   Body mass index is 26.48 kg/m².  Oxygen Device: Room Air    Vitals: /65 (BP Location: Left arm, Patient Position: Lying)   Pulse 79   Temp 97.3 °F (36.3 °C) (Oral)   Resp 18   Ht 5' 7" (1.702 m)   Wt 76.7 kg (169 lb 1.5 oz)   SpO2 97%   BMI 26.48 kg/m²     Outcome Measures:  AM-PAC 6 CLICK MOBILITY  Turning over in bed (including adjusting bedclothes, sheets and blankets)?: 3  Sitting down on and standing up from a chair with arms (e.g., wheelchair, bedside commode, etc.): 3  Moving from lying on back to sitting on the side of the bed?: 3  Moving to and from a bed to a chair (including a wheelchair)?: 3  Need to walk in hospital room?: 3  Climbing 3-5 steps with a railing?: 2  Basic Mobility Total Score: 17     Functional Mobility:    Bed Mobility:   Supine to Sit: minimum assistance; from L side of bed  Scooting anteriorly to EOB to have both feet planted on floor: minimum assistance  Sit to Supine: minimum assistance; to R side of bed    Sitting Balance at Edge of Bed:  Assistance Level Required: Stand-by Assistance  Time: 5 min  Postural " deviations noted: no deviations noted    Transfers:   Sit > Stand Transfer: minimum assistance with platform walker   Stand > Sit Transfer: contact guard assistance with platform walker   x1 trials from EOB    Standing Balance:  Assistance Level Required: Contact Guard Assistance  Patient used: platform walker   Time: 15 min  Postural deviations noted: no deviations noted      Gait:  Patient ambulated: 140ft   Patient required: contact guard  Patient used:  platform walker   Gait Pattern observed: swing-to gait  Gait Deviation(s): occasional unsteady gait, decreased step length, and decreased katharina  Impairments due to: impaired balance and decreased endurance  all lines remained intact throughout ambulation trial    Education:  Time provided for education, counseling and discussion of health disposition in regards to patient's current status  All questions answered within PT scope of practice and to patient's satisfaction  PT role in POC to address current functional deficits  Pt educated on proper body mechanics, safety techniques, and energy conservation with PT facilitation and cueing throughout session    Patient left HOB elevated with all lines intact, call button in reach, and RN Kathi notified.    GOALS:   Multidisciplinary Problems       Physical Therapy Goals          Problem: Physical Therapy    Goal Priority Disciplines Outcome Interventions   Physical Therapy Goal     PT, PT/OT Progressing    Description: Goals to be met by:      Patient will increase functional independence with mobility by performin. Supine to sit with Modified [place]  2. Sit to supine with Modified [place]  3. Sit to stand transfer with contact assistance  4. Gait  x 400 feet with Modified [place] using approved Assistive Device.   5. Ascend/descend 3 stair with bilateral Handrails Contact Guard Assistance                     Time Tracking:     PT Received On: 24  PT Start Time: 1343     PT Stop  Time: 1406  PT Total Time (min): 23 min     Billable Minutes:   Gait Training 15 min and Therapeutic Activity 8 min    Treatment Type: Treatment  PT/PTA: PT       Hector Figueroa PT, DPT  12/18/2024

## 2024-12-18 NOTE — PROGRESS NOTES
"[facility]  Endocrinology  Progress Note    Admit Date: 2024     Reason for Consult: Management of prediabetic, Hyperglycemia     Surgical Procedure and Date: s/p MVR on 2024    Patient is not diabetic and does not currently take any oral/injectable antidiabetic/hypoglycemic medications.         HPI: Wiliam Arreola is a 79 y.o. male with a PMHx of PE, DVT, HTN, severe MR, HLD, who presents to the SICU s/p mitral valve repair with Dr. Gates. On admission, he is intubated, sedated with propofol, and in stable condition. Endocrine consulted to manage hyperglycemia and pre-diabetes in the context of CTS.     Lab Results   Component Value Date    HGBA1C 6.1 (H) 2024           Interval HPI:   No acute events overnight. Patient in room 330/330 A. Blood glucose  variable . BG at, above, and below goal on current insulin regimen (Transition Insulin Drip). Steroid use- None. 5 Days Post-Op  Renal function- Normal   Lab Results   Component Value Date    CREATININE 1.1 2024     Vasopressors-  None     Endocrine will continue to follow and manage insulin orders inpatient.     Diet Cardiac     Eatin%  Nausea: No  Hypoglycemia and intervention: No  Fever: No  TPN and/or TF: No  If yes, type of TF/TPN and rate: N/A    /71 (BP Location: Left arm, Patient Position: Lying)   Pulse 79   Temp 98 °F (36.7 °C) (Oral)   Resp 18   Ht 5' 7" (1.702 m)   Wt 78.4 kg (172 lb 13.5 oz)   SpO2 96%   BMI 27.07 kg/m²     Labs Reviewed and Include    Recent Labs   Lab 24  0524      CALCIUM 8.5*      K 3.6   CO2 22*      BUN 25*   CREATININE 1.1     Lab Results   Component Value Date    WBC 9.24 2024    HGB 12.8 (L) 2024    HCT 37.2 (L) 2024    MCV 98 2024     2024     No results for input(s): "TSH", "FREET4" in the last 168 hours.  Lab Results   Component Value Date    HGBA1C 6.1 (H) 2024       Nutritional status: " "  Body mass index is 27.07 kg/m².  Lab Results   Component Value Date    ALBUMIN 2.7 (L) 12/17/2024    ALBUMIN 2.5 (L) 12/16/2024    ALBUMIN 3.0 (L) 12/15/2024     No results found for: "PREALBUMIN"    Estimated Creatinine Clearance: 50.1 mL/min (based on SCr of 1.1 mg/dL).    Accu-Checks  Recent Labs     12/16/24  0403 12/16/24  1130 12/16/24  1224 12/16/24  1553 12/16/24  2015 12/17/24  0018 12/17/24  0349 12/17/24  0822 12/17/24  1200 12/17/24  1938   POCTGLUCOSE 143* 131* 133* 131* 148* 170* 139* 127* 243* 247*       Current Medications and/or Treatments Impacting Glycemic Control  Immunotherapy:    Immunosuppressants       None          Steroids:   Hormones (From admission, onward)      Start     Stop Route Frequency Ordered    12/15/24 2100  melatonin tablet 6 mg         -- Oral Nightly 12/15/24 0632          Pressors:    Autonomic Drugs (From admission, onward)      None          Hyperglycemia/Diabetes Medications:   Antihyperglycemics (From admission, onward)      Start     Stop Route Frequency Ordered    12/17/24 1354  insulin aspart U-100 pen 0-5 Units         -- SubQ Before meals, nightly and at 0200 PRN 12/17/24 1255    12/17/24 0215  insulin regular in 0.9 % NaCl 100 unit/100 mL (1 unit/mL) infusion        Question Answer Comment   Insulin Rate Adjustment (DO NOT MODIFY ANSWER) [email or web address] [id number]    Enter initial dose from Infusion Protocol Chart (Units/hr): 0.8        -- IV Continuous 12/17/24 0106            ASSESSMENT and PLAN    Cardiac/Vascular  Rupture of chordae tendineae  S/p MVR  Optimize BG control to improve wound healing        Mitral regurgitation due to cusp prolapse  Managed per primary team  Avoid hypoglycemia        Endocrine  Prediabetes  Endocrinology consulted for BG management.   BG goal 110-140 CTS    - Discontinue transition drip   - Novolog (aspart) insulin prn for BG excursions LDC SSI (150/50).  - BG checks AC/HS  - " Hypoglycemia protocol in place    ** Please notify Endocrine for any change and/or advance in diet**  ** Please call Endocrine for any BG related issues **    Discharge Planning:   TBD. Please notify endocrinology prior to discharge.            Danita Elise PA-C  Endocrinology  [facility]

## 2024-12-18 NOTE — PLAN OF CARE
Problem: Adult Inpatient Plan of Care  Goal: Absence of Hospital-Acquired Illness or Injury  Outcome: Progressing  Goal: Optimal Comfort and Wellbeing  Outcome: Progressing     Problem: Cardiovascular Surgery  Goal: Optimal Coping with Heart Surgery  Outcome: Progressing  Goal: Absence of Bleeding  Outcome: Progressing  Goal: Fluid and Electrolyte Balance  Outcome: Progressing  Goal: Blood Glucose Level Within Targeted Range  Outcome: Progressing  Goal: Absence of Infection Signs and Symptoms  Outcome: Progressing  Goal: Acceptable Pain Control  Outcome: Progressing     Problem: Wound  Goal: Improved Oral Intake  Outcome: Progressing  Goal: Skin Health and Integrity  Outcome: Progressing     Problem: Fall Injury Risk  Goal: Absence of Fall and Fall-Related Injury  Outcome: Progressing

## 2024-12-18 NOTE — CARE UPDATE
I have reviewed the chart of Wiliam Arreola who is hospitalized for the following:    Active Hospital Problems    Diagnosis    *S/P MVR (mitral valve repair)    Hypokalemia    Debility     Pending rehab placement , PT OT to continue to work with patient       Acute blood loss anemia    Thrombocytopenia    Hypophosphatemia    Rupture of chordae tendineae    Prediabetes    Mitral regurgitation due to cusp prolapse    HTN (hypertension)    Hypothyroidism    HLD (hyperlipidemia)        Qian Pierce PA-C  Unit Based [facility]

## 2024-12-18 NOTE — SUBJECTIVE & OBJECTIVE
"Interval HPI:   No acute events overnight. Patient in room 330/330 A. Blood glucose  variable . BG at, above, and below goal on current insulin regimen (Transition Insulin Drip). Steroid use- None. 5 Days Post-Op  Renal function- Normal   Lab Results   Component Value Date    CREATININE 1.1 2024     Vasopressors-  None     Endocrine will continue to follow and manage insulin orders inpatient.     Diet Cardiac     Eatin%  Nausea: No  Hypoglycemia and intervention: No  Fever: No  TPN and/or TF: No  If yes, type of TF/TPN and rate: N/A    /71 (BP Location: Left arm, Patient Position: Lying)   Pulse 79   Temp 98 °F (36.7 °C) (Oral)   Resp 18   Ht 5' 7" (1.702 m)   Wt 78.4 kg (172 lb 13.5 oz)   SpO2 96%   BMI 27.07 kg/m²     Labs Reviewed and Include    Recent Labs   Lab 24  0524      CALCIUM 8.5*      K 3.6   CO2 22*      BUN 25*   CREATININE 1.1     Lab Results   Component Value Date    WBC 9.24 2024    HGB 12.8 (L) 2024    HCT 37.2 (L) 2024    MCV 98 2024     2024     No results for input(s): "TSH", "FREET4" in the last 168 hours.  Lab Results   Component Value Date    HGBA1C 6.1 (H) 2024       Nutritional status:   Body mass index is 27.07 kg/m².  Lab Results   Component Value Date    ALBUMIN 2.7 (L) 2024    ALBUMIN 2.5 (L) 2024    ALBUMIN 3.0 (L) 12/15/2024     No results found for: "PREALBUMIN"    Estimated Creatinine Clearance: 50.1 mL/min (based on SCr of 1.1 mg/dL).    Accu-Checks  Recent Labs     24  0403 24  1130 24  1224 24  1553 24  2015 24  0018 24  0349 24  0822 24  1200 24  1938   POCTGLUCOSE 143* 131* 133* 131* 148* 170* 139* 127* 243* 247*       Current Medications and/or Treatments Impacting Glycemic Control  Immunotherapy:    Immunosuppressants       None          Steroids:   Hormones (From admission, onward)      Start     Stop Route " Frequency Ordered    12/15/24 2100  melatonin tablet 6 mg         -- Oral Nightly 12/15/24 0632          Pressors:    Autonomic Drugs (From admission, onward)      None          Hyperglycemia/Diabetes Medications:   Antihyperglycemics (From admission, onward)      Start     Stop Route Frequency Ordered    12/17/24 1354  insulin aspart U-100 pen 0-5 Units         -- SubQ Before meals, nightly and at 0200 PRN 12/17/24 1255    12/17/24 0215  insulin regular in 0.9 % NaCl 100 unit/100 mL (1 unit/mL) infusion        Question Answer Comment   Insulin Rate Adjustment (DO NOT MODIFY ANSWER) [email or web address] [id number]    Enter initial dose from Infusion Protocol Chart (Units/hr): 0.8        -- IV Continuous 12/17/24 0106

## 2024-12-18 NOTE — NURSING
"Unit secretary called and stated that pt called the Hospital  on his personal cell phone because he couldn't find his call light laying next to him.  This RN entered room to find that pt had removed his IV and soiled the bed with urine and a small smear of liquid stool s/p suppository.  Telesitter never alarmed or called.  Pt asked what time it was and stated, "I don't believe it's that late".  Informed pt that he was sleeping comfortably for most of the evening.  Linen changed, gown changed, new IV started, & condom cath placed for urinary incontinence.  Bed alarm on, telesitter in the room, call light within reach.  Pt denies more needs at this time.  STEVEN RN  "

## 2024-12-18 NOTE — ASSESSMENT & PLAN NOTE
Endocrinology consulted for BG management.   BG goal 110-140 CTS    - Discontinue transition drip   - Novolog (aspart) insulin prn for BG excursions LDC SSI (150/50).  - BG checks AC/HS  - Hypoglycemia protocol in place    ** Please notify Endocrine for any change and/or advance in diet**  ** Please call Endocrine for any BG related issues **    Discharge Planning:   TBD. Please notify endocrinology prior to discharge.

## 2024-12-18 NOTE — ASSESSMENT & PLAN NOTE
79 y.o. male with a PMHx of PE, DVT, HTN, severe MR, HLD, now s/p mitral valve repair on 12/13/24      - Maintain sternal precautions   - ASA  - metoprolol increase to 25 mg   - Statin  - heparin sub q for DVT ppx  - Lasix  with scheduled potassium ordered   - Multimodal pain management   - Encourage ambulation  - PT/OT  - Cardiac diet with 1500 cc fluid restriction   - Bowel regimen in place   - pepcid for ulcer prevention   - Monitor electrolytes to keep Mag above 2 and Potassium above 4  - OOBTC  - Encourage IS use  - flomax   - duoneb Q4 WA   - echo tomorrow     - DISPO -CSU to rehab, awaiting placement

## 2024-12-18 NOTE — PROGRESS NOTES
[facility]  Cardiothoracic Surgery  Progress Note    Patient Name: Wiliam Arreola  MRN: [id number]  Admission Date: 12/13/2024  Hospital Length of Stay: 5 days  Code Status: Prior   Attending Physician: Babatunde Gates MD   Referring Provider: Babatunde Gates MD  Principal Problem:S/P MVR (mitral valve repair)            Subjective:     Post-Op Info:  Procedure(s) (LRB):  REPAIR, MITRAL VALVE, OPEN (N/A)   5 Days Post-Op     Interval History: no acute changes. patient would like to walk more. Ok to use platform walker with nursing staff. Awaiting placement.    Review of Systems   Constitutional: Negative for malaise/fatigue.   Cardiovascular:  Negative for chest pain, claudication, dyspnea on exertion, irregular heartbeat, leg swelling and palpitations.   Respiratory:  Negative for cough and shortness of breath.    Hematologic/Lymphatic: Negative for bleeding problem.   Gastrointestinal:  Negative for abdominal pain.   Genitourinary:  Negative for dysuria.   Neurological:  Negative for headaches and weakness.     Medications:  Continuous Infusions:  Scheduled Meds:   acetaminophen  650 mg Oral Q8H    albuterol-ipratropium  3 mL Nebulization Q4H WAKE    aspirin  325 mg Oral Daily    atorvastatin  40 mg Oral Daily    docusate sodium  100 mg Oral BID    famotidine  20 mg Oral Daily    furosemide  20 mg Oral BID    heparin (porcine)  7,500 Units Subcutaneous Q8H    levothyroxine  75 mcg Oral Before breakfast    LIDOcaine  3 patch Transdermal Q24H    melatonin  6 mg Oral Nightly    metoprolol tartrate  25 mg Oral BID    polyethylene glycol  17 g Oral BID    potassium chloride  20 mEq Oral BID    senna-docusate 8.6-50 mg  1 tablet Oral Daily    tamsulosin  0.4 mg Oral Daily     PRN Meds:  Current Facility-Administered Medications:     albumin human 5%, 25 g, Intravenous, Once PRN    aspirin, 300 mg, Rectal, Once PRN    bisacodyL, 10 mg, Rectal, Daily PRN    dextrose 10%, 12.5 g, Intravenous,  PRN    dextrose 10%, 12.5 g, Intravenous, PRN    dextrose 10%, 25 g, Intravenous, PRN    dextrose 10%, 25 g, Intravenous, PRN    dextrose, 16 g, Oral, PRN    dextrose, 16 g, Oral, PRN    dextrose, 24 g, Oral, PRN    glucagon (human recombinant), 1 mg, Intramuscular, PRN    glucose, 16 g, Oral, PRN    glucose, 24 g, Oral, PRN    insulin aspart U-100, 0-5 Units, Subcutaneous, QID (AC + HS) PRN    sodium chloride 0.9%, 10 mL, Intravenous, PRN     Objective:     Vital Signs (Most Recent):  Temp: 98 °F (36.7 °C) (12/18/24 0716)  Pulse: 87 (12/18/24 0820)  Resp: 18 (12/18/24 0806)  BP: 134/71 (12/18/24 0716)  SpO2: 96 % (12/18/24 0806) Vital Signs (24h Range):  Temp:  [96.9 °F (36.1 °C)-98 °F (36.7 °C)] 98 °F (36.7 °C)  Pulse:  [66-95] 87  Resp:  [16-22] 18  SpO2:  [94 %-98 %] 96 %  BP: (118-134)/(59-71) 134/71     Weight: 76.7 kg (169 lb 1.5 oz)  Body mass index is 26.48 kg/m².    SpO2: 96 %       Intake/Output - Last 3 Shifts         12/16 0700  12/17 0659 12/17 0700 12/18 0659 12/18 0700  12/19 0659    P.O. 750 582     I.V. (mL/kg)       Total Intake(mL/kg) 750 (8.8) 582 (7.4)     Urine (mL/kg/hr) 1350 (0.7) 775 (0.4)     Stool 0 0     Chest Tube       Total Output 1350 775     Net -600 -193            Urine Occurrence 2 x 4 x     Stool Occurrence 0 x 1 x             Lines/Drains/Airways       Drain  Duration             Male External Urinary Catheter 12/17/24 2000 <1 day              Line  Duration                  Pacer Wires 12/13/24 1214 4 days              Peripheral Intravenous Line  Duration                  Peripheral IV - Single Lumen 12/17/24 0400 22 G Right Forearm 1 day                     Physical Exam  Constitutional:       Appearance: Normal appearance.   HENT:      Head: Normocephalic.   Eyes:      Pupils: Pupils are equal, round, and reactive to light.   Cardiovascular:      Rate and Rhythm: Normal rate and regular rhythm.      Pulses: Normal pulses.   Pulmonary:      Effort: Pulmonary effort is  "normal.   Abdominal:      General: Abdomen is flat. Bowel sounds are normal.      Palpations: Abdomen is soft.   Musculoskeletal:         General: Normal range of motion.   Skin:     General: Skin is warm and dry.      Comments: MSI CDI   Neurological:      General: No focal deficit present.      Mental Status: He is alert.            Significant Labs:  CBC:   Recent Labs   Lab 12/18/24  0524   WBC 9.24   RBC 3.80*   HGB 12.8*   HCT 37.2*      MCV 98   MCH 33.7*   MCHC 34.4     CMP:   Recent Labs   Lab 12/17/24  0424 12/18/24  0524   * 106   CALCIUM 8.8 8.5*   ALBUMIN 2.7*  --    PROT 6.1  --     136   K 4.0 3.6   CO2 20* 22*    104   BUN 29* 25*   CREATININE 1.2 1.1   ALKPHOS 75  --    ALT 10  --    AST 29  --    BILITOT 0.9  --      Coagulation: No results for input(s): "PT", "INR", "APTT" in the last 48 hours.  LFTs:   Recent Labs   Lab 12/17/24  0424   ALT 10   AST 29   ALKPHOS 75   BILITOT 0.9   PROT 6.1   ALBUMIN 2.7*       Significant Diagnostics:  I have reviewed all pertinent imaging results/findings within the past 24 hours.  Assessment/Plan:     * S/P MVR (mitral valve repair)  79 y.o. male with a PMHx of PE, DVT, HTN, severe MR, HLD, now s/p mitral valve repair on 12/13/24      - Maintain sternal precautions   - ASA  - metoprolol increase to 25 mg   - Statin  - heparin sub q for DVT ppx  - Lasix  with scheduled potassium ordered   - Multimodal pain management   - Encourage ambulation  - PT/OT  - Cardiac diet with 1500 cc fluid restriction   - Bowel regimen in place   - pepcid for ulcer prevention   - Monitor electrolytes to keep Mag above 2 and Potassium above 4  - OOBTC  - Encourage IS use  - flomax   - duoneb Q4 WA   - echo tomorrow     - DISPO -CSU to rehab, awaiting placement          Hypokalemia  PRN and scheduled replacements   BMP to monitor trends     Hypophosphatemia  Scheduled and PRN replacement   Lab daily to monitor trends    Thrombocytopenia  Post op expected   CBC " daily to monitor trends     Acute blood loss anemia  - Can be expected postoperatively   - CBC daily to monitor trends   - No need for replacement today        Prediabetes  Endocrine following     Rupture of chordae tendineae  See s/p MVr     Hypothyroidism  Continue home levothyroxine     HTN (hypertension)  Nifedipine 60 PO daily - hold     HLD (hyperlipidemia)  Statin         Yolanda Cueto, PA-C  Cardiothoracic Surgery  [facility]

## 2024-12-18 NOTE — PLAN OF CARE
Pt was visited and evaluated by [facility]. Per liaison pt is accepted pending insurance auth. STEVEN contacted [facility], Maegan [phone], who reported pt is not in their system. Maegan is going to determine which [facility] system pt signed on with and call back. STEVEN also consulted [facility] to further assist with obtaining auth for this pt.    María Guillen, MSW  Case Management  [phone]

## 2024-12-19 LAB
ANION GAP SERPL CALC-SCNC: 7 MMOL/L (ref 8–16)
ASCENDING AORTA: 3.3 CM
AV AREA BY CONTINUOUS VTI: 2.4 CM2
AV INDEX (PROSTH): 0.69
AV LVOT MEAN GRADIENT: 3 MMHG
AV LVOT PEAK GRADIENT: 5 MMHG
AV MEAN GRADIENT: 7.8 MMHG
AV PEAK GRADIENT: 14.4 MMHG
AV VALVE AREA BY VELOCITY RATIO: 2 CM²
AV VALVE AREA: 2.4 CM2
AV VELOCITY RATIO: 0.58
BASOPHILS # BLD AUTO: 0.08 K/UL (ref 0–0.2)
BASOPHILS NFR BLD: 0.7 % (ref 0–1.9)
BSA FOR ECHO PROCEDURE: 1.92 M2
BUN SERPL-MCNC: 27 MG/DL (ref 8–23)
CALCIUM SERPL-MCNC: 8.7 MG/DL (ref 8.7–10.5)
CHLORIDE SERPL-SCNC: 105 MMOL/L (ref 95–110)
CO2 SERPL-SCNC: 25 MMOL/L (ref 23–29)
CREAT SERPL-MCNC: 1.2 MG/DL (ref 0.5–1.4)
CV ECHO LV RWT: 0.5 CM
DIFFERENTIAL METHOD BLD: ABNORMAL
DOP CALC AO PEAK VEL: 1.9 M/S
DOP CALC AO VTI: 29.2 CM
DOP CALC LVOT AREA: 3.5 CM2
DOP CALC LVOT DIAMETER: 2.1 CM
DOP CALC LVOT PEAK VEL: 1.1 M/S
DOP CALC LVOT STROKE VOLUME: 69.6 CM3
DOP CALCLVOT PEAK VEL VTI: 20.1 CM
E WAVE DECELERATION TIME: 293.39 MS
E/A RATIO: 0.87
E/E' RATIO: 22.18 M/S
ECHO EF ESTIMATED: 47 %
ECHO LV POSTERIOR WALL: 1 CM (ref 0.6–1.1)
EJECTION FRACTION: 50 %
EOSINOPHIL # BLD AUTO: 0.5 K/UL (ref 0–0.5)
EOSINOPHIL NFR BLD: 4 % (ref 0–8)
ERYTHROCYTE [DISTWIDTH] IN BLOOD BY AUTOMATED COUNT: 12.3 % (ref 11.5–14.5)
EST. GFR  (NO RACE VARIABLE): >60 ML/MIN/1.73 M^2
FRACTIONAL SHORTENING: 22.5 % (ref 28–44)
GLUCOSE SERPL-MCNC: 147 MG/DL (ref 70–110)
HCT VFR BLD AUTO: 38.6 % (ref 40–54)
HGB BLD-MCNC: 12.7 G/DL (ref 14–18)
HR MV ECHO: 94 BPM
IMM GRANULOCYTES # BLD AUTO: 0.24 K/UL (ref 0–0.04)
IMM GRANULOCYTES NFR BLD AUTO: 2.1 % (ref 0–0.5)
INTERVENTRICULAR SEPTUM: 1.1 CM (ref 0.6–1.1)
LA MAJOR: 5.1 CM
LA MINOR: 5.24 CM
LA WIDTH: 4.65 CM
LEFT ATRIUM SIZE: 3.59 CM
LEFT ATRIUM VOLUME INDEX MOD: 44.1 ML/M2
LEFT ATRIUM VOLUME INDEX: 39 ML/M2
LEFT ATRIUM VOLUME MOD: 82.95 ML
LEFT ATRIUM VOLUME: 73.35 CM3
LEFT INTERNAL DIMENSION IN SYSTOLE: 3.1 CM (ref 2.1–4)
LEFT VENTRICLE DIASTOLIC VOLUME INDEX: 37.07 ML/M2
LEFT VENTRICLE DIASTOLIC VOLUME: 69.69 ML
LEFT VENTRICLE MASS INDEX: 72.4 G/M2
LEFT VENTRICLE SYSTOLIC VOLUME INDEX: 19.5 ML/M2
LEFT VENTRICLE SYSTOLIC VOLUME: 36.61 ML
LEFT VENTRICULAR INTERNAL DIMENSION IN DIASTOLE: 4 CM (ref 3.5–6)
LEFT VENTRICULAR MASS: 136.2 G
LV LATERAL E/E' RATIO: 17.43
LV SEPTAL E/E' RATIO: 30.5
LYMPHOCYTES # BLD AUTO: 1.1 K/UL (ref 1–4.8)
LYMPHOCYTES NFR BLD: 9.8 % (ref 18–48)
MAGNESIUM SERPL-MCNC: 2.3 MG/DL (ref 1.6–2.6)
MAGNESIUM SERPL-MCNC: 2.5 MG/DL (ref 1.6–2.6)
MCH RBC QN AUTO: 32.3 PG (ref 27–31)
MCHC RBC AUTO-ENTMCNC: 32.9 G/DL (ref 32–36)
MCV RBC AUTO: 98 FL (ref 82–98)
MONOCYTES # BLD AUTO: 1.1 K/UL (ref 0.3–1)
MONOCYTES NFR BLD: 9.9 % (ref 4–15)
MV MEAN GRADIENT: 5 MMHG
MV PEAK A VEL: 1.41 M/S
MV PEAK E VEL: 1.22 M/S
MV PEAK GRADIENT: 8 MMHG
MV VALVE AREA BY PLANIMETRY: 4.81 CM2
NEUTROPHILS # BLD AUTO: 8.4 K/UL (ref 1.8–7.7)
NEUTROPHILS NFR BLD: 73.5 % (ref 38–73)
NRBC BLD-RTO: 0 /100 WBC
OHS CV RV/LV RATIO: 0.73 CM
OHS QRS DURATION: 136 MS
OHS QTC CALCULATION: 490 MS
PHOSPHATE SERPL-MCNC: 3.1 MG/DL (ref 2.7–4.5)
PISA TR MAX VEL: 2.23 M/S
PLATELET # BLD AUTO: 198 K/UL (ref 150–450)
PMV BLD AUTO: 10.2 FL (ref 9.2–12.9)
POCT GLUCOSE: 136 MG/DL (ref 70–110)
POCT GLUCOSE: 161 MG/DL (ref 70–110)
POCT GLUCOSE: 221 MG/DL (ref 70–110)
POTASSIUM SERPL-SCNC: 3.9 MMOL/L (ref 3.5–5.1)
POTASSIUM SERPL-SCNC: 4.3 MMOL/L (ref 3.5–5.1)
RA PRESSURE ESTIMATED: 3 MMHG
RBC # BLD AUTO: 3.93 M/UL (ref 4.6–6.2)
RIGHT VENTRICLE DIASTOLIC BASEL DIMENSION: 2.9 CM
RV TB RVSP: 5 MMHG
RV TISSUE DOPPLER FREE WALL SYSTOLIC VELOCITY 1 (APICAL 4 CHAMBER VIEW): 9.23 CM/S
SINUS: 3.37 CM
SODIUM SERPL-SCNC: 137 MMOL/L (ref 136–145)
STJ: 2.97 CM
TDI LATERAL: 0.07 M/S
TDI SEPTAL: 0.04 M/S
TDI: 0.06 M/S
TR MAX PG: 20 MMHG
TV PEAK GRADIENT: 20 MMHG
TV REST PULMONARY ARTERY PRESSURE: 23 MMHG
WBC # BLD AUTO: 11.39 K/UL (ref 3.9–12.7)
Z-SCORE OF LEFT VENTRICULAR DIMENSION IN END DIASTOLE: -2.72
Z-SCORE OF LEFT VENTRICULAR DIMENSION IN END SYSTOLE: -0.34

## 2024-12-19 PROCEDURE — 99024 POSTOP FOLLOW-UP VISIT: CPT | Mod: ,,, | Performed by: PHYSICIAN ASSISTANT

## 2024-12-19 PROCEDURE — 20600001 HC STEP DOWN PRIVATE ROOM

## 2024-12-19 PROCEDURE — 84132 ASSAY OF SERUM POTASSIUM: CPT | Performed by: THORACIC SURGERY (CARDIOTHORACIC VASCULAR SURGERY)

## 2024-12-19 PROCEDURE — 83735 ASSAY OF MAGNESIUM: CPT

## 2024-12-19 PROCEDURE — 25000003 PHARM REV CODE 250

## 2024-12-19 PROCEDURE — 83735 ASSAY OF MAGNESIUM: CPT | Mod: 91 | Performed by: THORACIC SURGERY (CARDIOTHORACIC VASCULAR SURGERY)

## 2024-12-19 PROCEDURE — 99232 SBSQ HOSP IP/OBS MODERATE 35: CPT | Mod: ,,,

## 2024-12-19 PROCEDURE — 25000003 PHARM REV CODE 250: Performed by: STUDENT IN AN ORGANIZED HEALTH CARE EDUCATION/TRAINING PROGRAM

## 2024-12-19 PROCEDURE — 99900035 HC TECH TIME PER 15 MIN (STAT)

## 2024-12-19 PROCEDURE — 85025 COMPLETE CBC W/AUTO DIFF WBC: CPT

## 2024-12-19 PROCEDURE — 84100 ASSAY OF PHOSPHORUS: CPT

## 2024-12-19 PROCEDURE — 36415 COLL VENOUS BLD VENIPUNCTURE: CPT | Performed by: THORACIC SURGERY (CARDIOTHORACIC VASCULAR SURGERY)

## 2024-12-19 PROCEDURE — 94640 AIRWAY INHALATION TREATMENT: CPT

## 2024-12-19 PROCEDURE — 80048 BASIC METABOLIC PNL TOTAL CA: CPT

## 2024-12-19 PROCEDURE — 25000003 PHARM REV CODE 250: Performed by: PHYSICIAN ASSISTANT

## 2024-12-19 PROCEDURE — 93005 ELECTROCARDIOGRAM TRACING: CPT

## 2024-12-19 PROCEDURE — 25000242 PHARM REV CODE 250 ALT 637 W/ HCPCS

## 2024-12-19 PROCEDURE — 93010 ELECTROCARDIOGRAM REPORT: CPT | Mod: ,,, | Performed by: INTERNAL MEDICINE

## 2024-12-19 PROCEDURE — 63600175 PHARM REV CODE 636 W HCPCS

## 2024-12-19 PROCEDURE — 94761 N-INVAS EAR/PLS OXIMETRY MLT: CPT

## 2024-12-19 RX ORDER — ASPIRIN 81 MG/1
81 TABLET ORAL DAILY
Status: DISCONTINUED | OUTPATIENT
Start: 2024-12-20 | End: 2024-12-21 | Stop reason: HOSPADM

## 2024-12-19 RX ORDER — METOPROLOL TARTRATE 50 MG/1
50 TABLET ORAL 2 TIMES DAILY
Status: DISCONTINUED | OUTPATIENT
Start: 2024-12-19 | End: 2024-12-21 | Stop reason: HOSPADM

## 2024-12-19 RX ORDER — AMIODARONE HYDROCHLORIDE 200 MG/1
400 TABLET ORAL 2 TIMES DAILY
Status: DISCONTINUED | OUTPATIENT
Start: 2024-12-19 | End: 2024-12-21 | Stop reason: HOSPADM

## 2024-12-19 RX ADMIN — AMIODARONE HYDROCHLORIDE 400 MG: 200 TABLET ORAL at 09:12

## 2024-12-19 RX ADMIN — IPRATROPIUM BROMIDE AND ALBUTEROL SULFATE 3 ML: 2.5; .5 SOLUTION RESPIRATORY (INHALATION) at 08:12

## 2024-12-19 RX ADMIN — LEVOTHYROXINE SODIUM 75 MCG: 25 TABLET ORAL at 06:12

## 2024-12-19 RX ADMIN — INSULIN ASPART 2 UNITS: 100 INJECTION, SOLUTION INTRAVENOUS; SUBCUTANEOUS at 09:12

## 2024-12-19 RX ADMIN — ACETAMINOPHEN 650 MG: 325 TABLET ORAL at 09:12

## 2024-12-19 RX ADMIN — TAMSULOSIN HYDROCHLORIDE 0.4 MG: 0.4 CAPSULE ORAL at 08:12

## 2024-12-19 RX ADMIN — HEPARIN SODIUM 7500 UNITS: 5000 INJECTION INTRAVENOUS; SUBCUTANEOUS at 01:12

## 2024-12-19 RX ADMIN — MELATONIN TAB 3 MG 6 MG: 3 TAB at 09:12

## 2024-12-19 RX ADMIN — FUROSEMIDE 20 MG: 20 TABLET ORAL at 05:12

## 2024-12-19 RX ADMIN — POLYETHYLENE GLYCOL 3350 17 G: 17 POWDER, FOR SOLUTION ORAL at 09:12

## 2024-12-19 RX ADMIN — ASPIRIN 325 MG ORAL TABLET 325 MG: 325 PILL ORAL at 08:12

## 2024-12-19 RX ADMIN — FAMOTIDINE 20 MG: 20 TABLET ORAL at 08:12

## 2024-12-19 RX ADMIN — ACETAMINOPHEN 650 MG: 325 TABLET ORAL at 01:12

## 2024-12-19 RX ADMIN — IPRATROPIUM BROMIDE AND ALBUTEROL SULFATE 3 ML: 2.5; .5 SOLUTION RESPIRATORY (INHALATION) at 11:12

## 2024-12-19 RX ADMIN — ATORVASTATIN CALCIUM 40 MG: 40 TABLET, FILM COATED ORAL at 08:12

## 2024-12-19 RX ADMIN — FUROSEMIDE 20 MG: 20 TABLET ORAL at 08:12

## 2024-12-19 RX ADMIN — POTASSIUM CHLORIDE 20 MEQ: 1500 TABLET, EXTENDED RELEASE ORAL at 08:12

## 2024-12-19 RX ADMIN — APIXABAN 5 MG: 5 TABLET, FILM COATED ORAL at 09:12

## 2024-12-19 RX ADMIN — INSULIN ASPART 1 UNITS: 100 INJECTION, SOLUTION INTRAVENOUS; SUBCUTANEOUS at 07:12

## 2024-12-19 RX ADMIN — POTASSIUM CHLORIDE 20 MEQ: 1500 TABLET, EXTENDED RELEASE ORAL at 09:12

## 2024-12-19 RX ADMIN — METOPROLOL TARTRATE 50 MG: 50 TABLET, FILM COATED ORAL at 08:12

## 2024-12-19 RX ADMIN — ACETAMINOPHEN 650 MG: 325 TABLET ORAL at 06:12

## 2024-12-19 RX ADMIN — HEPARIN SODIUM 7500 UNITS: 5000 INJECTION INTRAVENOUS; SUBCUTANEOUS at 06:12

## 2024-12-19 RX ADMIN — METOPROLOL TARTRATE 50 MG: 50 TABLET, FILM COATED ORAL at 09:12

## 2024-12-19 RX ADMIN — IPRATROPIUM BROMIDE AND ALBUTEROL SULFATE 3 ML: 2.5; .5 SOLUTION RESPIRATORY (INHALATION) at 04:12

## 2024-12-19 RX ADMIN — DOCUSATE SODIUM 100 MG: 100 CAPSULE, LIQUID FILLED ORAL at 09:12

## 2024-12-19 NOTE — PROGRESS NOTES
"[facility] Step[facility]  Endocrinology  Progress Note    Admit Date: 2024     Reason for Consult: Management of prediabetic, Hyperglycemia     Surgical Procedure and Date: s/p MVR on 2024    Patient is not diabetic and does not currently take any oral/injectable antidiabetic/hypoglycemic medications.         HPI: Wiliam Arreola is a 79 y.o. male with a PMHx of PE, DVT, HTN, severe MR, HLD, who presents to the SICU s/p mitral valve repair with Dr. Gates. On admission, he is intubated, sedated with propofol, and in stable condition. Endocrine consulted to manage hyperglycemia and pre-diabetes in the context of CTS.     Lab Results   Component Value Date    HGBA1C 6.1 (H) 2024           Interval HPI:   No acute events overnight. Patient in room 330/330 A. Blood glucose improving. BG at and above goal on current insulin regimen (SSI ). Steroid use- None. 6 Days Post-Op  Renal function- Normal   Lab Results   Component Value Date    CREATININE 1.2 2024     Vasopressors-  None     Endocrine will continue to follow and manage insulin orders inpatient.     Diet Cardiac     Eatin%  Nausea: No  Hypoglycemia and intervention: No  Fever: No  TPN and/or TF: No  If yes, type of TF/TPN and rate: N/A    /80 (Patient Position: Lying)   Pulse 90   Temp 98.4 °F (36.9 °C) (Oral)   Resp 18   Ht 5' 7" (1.702 m)   Wt 76.7 kg (169 lb 1.5 oz)   SpO2 96%   BMI 26.48 kg/m²     Labs Reviewed and Include    Recent Labs   Lab 24  0349   *   CALCIUM 8.7      K 3.9   CO2 25      BUN 27*   CREATININE 1.2     Lab Results   Component Value Date    WBC 11.39 2024    HGB 12.7 (L) 2024    HCT 38.6 (L) 2024    MCV 98 2024     2024     No results for input(s): "TSH", "FREET4" in the last 168 hours.  Lab Results   Component Value Date    HGBA1C 6.1 (H) 2024       Nutritional status:   Body mass index is 26.48 kg/m².  Lab Results " "  Component Value Date    ALBUMIN 2.7 (L) 12/17/2024    ALBUMIN 2.5 (L) 12/16/2024    ALBUMIN 3.0 (L) 12/15/2024     No results found for: "PREALBUMIN"    Estimated Creatinine Clearance: 45.9 mL/min (based on SCr of 1.2 mg/dL).    Accu-Checks  Recent Labs     12/16/24  1553 12/16/24  2015 12/17/24  0018 12/17/24  0349 12/17/24  0822 12/17/24  1200 12/17/24  1938 12/18/24  1557 12/18/24  1958 12/19/24  0624   POCTGLUCOSE 131* 148* 170* 139* 127* 243* 247* 183* 207* 161*       Current Medications and/or Treatments Impacting Glycemic Control  Immunotherapy:    Immunosuppressants       None          Steroids:   Hormones (From admission, onward)      Start     Stop Route Frequency Ordered    12/15/24 2100  melatonin tablet 6 mg         -- Oral Nightly 12/15/24 0632          Pressors:    Autonomic Drugs (From admission, onward)      None          Hyperglycemia/Diabetes Medications:   Antihyperglycemics (From admission, onward)      Start     Stop Route Frequency Ordered    12/18/24 0939  insulin aspart U-100 pen 0-5 Units         -- SubQ Before meals & nightly PRN 12/18/24 0839            ASSESSMENT and PLAN    Cardiac/Vascular  * S/P MVR (mitral valve repair)  Managed by primary team  Optimize BG control to improve wound healing      Mitral regurgitation due to cusp prolapse  Managed per primary team  Avoid hypoglycemia        Endocrine  Prediabetes  Endocrinology consulted for BG management.   BG goal 110-140 CTS    - Novolog (aspart) insulin prn for BG excursions LDC SSI (150/50).  - BG checks AC/HS  - Hypoglycemia protocol in place    ** Please notify Endocrine for any change and/or advance in diet**  ** Please call Endocrine for any BG related issues **    Discharge Planning:   Patient will not require oral/injectable diabetes medications upon discharge.            Danita Elise PA-C  Endocrinology  [facility] - Cardiology Stepdown  "

## 2024-12-19 NOTE — ASSESSMENT & PLAN NOTE
Endocrinology consulted for BG management.   BG goal 110-140 CTS    - Novolog (aspart) insulin prn for BG excursions LDC SSI (150/50).  - BG checks AC/HS  - Hypoglycemia protocol in place    ** Please notify Endocrine for any change and/or advance in diet**  ** Please call Endocrine for any BG related issues **    Discharge Planning:   Patient will not require oral/injectable diabetes medications upon discharge.

## 2024-12-19 NOTE — NURSING
Called by tele to notify that pt HR has been elevated 110s-130s. VSS, Pt in NAD, resting in bed. Notified CONNIE Zhang. STAT EKG, K and Mag ordered.     1650 EKG showing Afib RVR. Notified CONNIE Zhang. Orders placed for Amio gtt. Pt updated.     1719 Pt now running SR in the 90s. CONNIE Zhang notified. Amio gtt held.    1731 Pt started on PO amio and eliquis.

## 2024-12-19 NOTE — SUBJECTIVE & OBJECTIVE
Interval History: NAEON. Awaiting [facility]. Continue working with PT. BRIE today.     Review of Systems   Constitutional: Positive for malaise/fatigue.   Cardiovascular:  Positive for dyspnea on exertion. Negative for chest pain.   Respiratory:  Negative for shortness of breath.    Hematologic/Lymphatic: Negative for bleeding problem.   Neurological:  Positive for weakness.     Medications:  Continuous Infusions:  Scheduled Meds:   acetaminophen  650 mg Oral Q8H    albuterol-ipratropium  3 mL Nebulization Q4H WAKE    aspirin  325 mg Oral Daily    atorvastatin  40 mg Oral Daily    docusate sodium  100 mg Oral BID    famotidine  20 mg Oral Daily    furosemide  20 mg Oral BID    heparin (porcine)  7,500 Units Subcutaneous Q8H    levothyroxine  75 mcg Oral Before breakfast    LIDOcaine  3 patch Transdermal Q24H    melatonin  6 mg Oral Nightly    metoprolol tartrate  50 mg Oral BID    polyethylene glycol  17 g Oral BID    potassium chloride  20 mEq Oral BID    senna-docusate 8.6-50 mg  1 tablet Oral Daily    tamsulosin  0.4 mg Oral Daily     PRN Meds:  Current Facility-Administered Medications:     albumin human 5%, 25 g, Intravenous, Once PRN    aspirin, 300 mg, Rectal, Once PRN    bisacodyL, 10 mg, Rectal, Daily PRN    dextrose 10%, 12.5 g, Intravenous, PRN    dextrose 10%, 12.5 g, Intravenous, PRN    dextrose 10%, 25 g, Intravenous, PRN    dextrose 10%, 25 g, Intravenous, PRN    dextrose, 16 g, Oral, PRN    dextrose, 16 g, Oral, PRN    dextrose, 24 g, Oral, PRN    glucagon (human recombinant), 1 mg, Intramuscular, PRN    glucose, 16 g, Oral, PRN    glucose, 24 g, Oral, PRN    insulin aspart U-100, 0-5 Units, Subcutaneous, QID (AC + HS) PRN    sodium chloride 0.9%, 10 mL, Intravenous, PRN     Objective:     Vital Signs (Most Recent):  Temp: 98.4 °F (36.9 °C) (12/19/24 0737)  Pulse: 82 (12/19/24 0803)  Resp: 20 (12/19/24 0803)  BP: 137/80 (12/19/24 0737)  SpO2: 96 % (12/19/24 0803) Vital Signs  "(24h Range):  Temp:  [97.3 °F (36.3 °C)-98.4 °F (36.9 °C)] 98.4 °F (36.9 °C)  Pulse:  [71-90] 82  Resp:  [18-20] 20  SpO2:  [94 %-98 %] 96 %  BP: ()/(60-81) 137/80     Weight: 76.7 kg (169 lb 1.5 oz)  Body mass index is 26.48 kg/m².    SpO2: 96 %       Intake/Output - Last 3 Shifts         12/17 0700 12/18 0659 12/18 0700 12/19 0659 12/19 0700 12/20 0659    P.O. 582 422 240    Total Intake(mL/kg) 582 (7.4) 422 (5.5) 240 (3.1)    Urine (mL/kg/hr) 775 (0.4)      Stool 0      Total Output 775      Net -193 +422 +240           Urine Occurrence 4 x 3 x     Stool Occurrence 1 x 1 x             Lines/Drains/Airways       Peripheral Intravenous Line  Duration                  Peripheral IV - Single Lumen 12/17/24 0400 22 G Right Forearm 2 days                     Physical Exam  Constitutional:       General: He is not in acute distress.  HENT:      Head: Normocephalic and atraumatic.   Cardiovascular:      Rate and Rhythm: Normal rate and regular rhythm.   Pulmonary:      Effort: Pulmonary effort is normal. No respiratory distress.   Musculoskeletal:         General: Normal range of motion.      Cervical back: Normal range of motion.   Skin:     Coloration: Skin is not pale.      Comments: Midline sternal incision c/d/i   Neurological:      General: No focal deficit present.      Mental Status: He is alert.   Psychiatric:         Mood and Affect: Mood normal.         Behavior: Behavior normal.            Significant Labs:  BMP:   Recent Labs   Lab 12/19/24  0349   *      K 3.9      CO2 25   BUN 27*   CREATININE 1.2   CALCIUM 8.7   MG 2.3     Cardiac markers: No results for input(s): "CKMB", "CPKMB", "TROPONINT", "TROPONINI", "MYOGLOBIN" in the last 48 hours.  CBC:   Recent Labs   Lab 12/19/24  0349   WBC 11.39   RBC 3.93*   HGB 12.7*   HCT 38.6*      MCV 98   MCH 32.3*   MCHC 32.9     CMP:   Recent Labs   Lab 12/19/24  0349   *   CALCIUM 8.7      K 3.9   CO2 25      BUN " "27*   CREATININE 1.2     Coagulation: No results for input(s): "PT", "INR", "APTT" in the last 48 hours.  Lactic Acid: No results for input(s): "LACTATE" in the last 48 hours.  LFTs: No results for input(s): "ALT", "AST", "ALKPHOS", "BILITOT", "PROT", "ALBUMIN" in the last 48 hours.  Lipase: No results for input(s): "LIPASE" in the last 48 hours.    Significant Diagnostics:  I have reviewed all pertinent imaging results/findings within the past 24 hours.  "

## 2024-12-19 NOTE — ASSESSMENT & PLAN NOTE
Re: URGENT    Dear Dr. COY ZARATE and office staff,   Regarding:  Ho Moon,       MRN: [id number], : 1988.   DATE OF PROCEDURE:  9/3/24    [facility] PRE ADMISSION TESTING:  PHONE:  [phone]                 FAX:  [phone]      [x] Medical Clearance Requested               Appointment Date: TBD--Pt. calling to make appointment today (24)      [x] Your patient has a positive screening for Obstructive Sleep Apnea (notified per protocol)      PROCEDURE:  LAPAROSCOPIC PERITONEAL DIALYSIS CATHETER PLACEMENT       For concerns or orders regarding the attached abnormal result, please contact the surgeon.  [facility] Order Requirements state:  ALL ORDERS MUST BE DATED, LEGIBLE AND CONTAIN:  Full Patient Legal Name (as appears on ’s license)  Patient’s Date of Birth (as appears on ’s license)  Pre-admission testing as per anesthesia guidelines  Diagnosis and procedural consent.  (No spelling errors or abbreviations)  Physician/provider name  Physician/Provider CMS Approved Signature    All documentation (ie. History and Physical, labs) MUST contain name and date of birth on EACH page.    Thank you for helping us provide you and your patient with the best possible experience!   SubQ heparin

## 2024-12-19 NOTE — PROGRESS NOTES
[facility] - Cardiology Stepdown  Cardiothoracic Surgery  Progress Note    Patient Name: Wiliam Arreola  MRN: [id number]  Admission Date: 12/13/2024  Hospital Length of Stay: 6 days  Code Status: Prior   Attending Physician: Babatunde Gates MD   Referring Provider: Babatunde Gates MD  Principal Problem:S/P MVR (mitral valve repair)      Subjective:     Post-Op Info:  Procedure(s) (LRB):  REPAIR, MITRAL VALVE, OPEN (N/A)   6 Days Post-Op     Interval History: NAEON. [facility]wait[facility]. Continue working with PT. TTE today.     Review of Systems   Constitutional: Positive for malaise/fatigue.   Cardiovascular:  Positive for dyspnea on exertion. Negative for chest pain.   Respiratory:  Negative for shortness of breath.    Hematologic/Lymphatic: Negative for bleeding problem.   Neurological:  Positive for weakness.     Medications:  Continuous Infusions:  Scheduled Meds:   acetaminophen  650 mg Oral Q8H    albuterol-ipratropium  3 mL Nebulization Q4H WAKE    aspirin  325 mg Oral Daily    atorvastatin  40 mg Oral Daily    docusate sodium  100 mg Oral BID    famotidine  20 mg Oral Daily    furosemide  20 mg Oral BID    heparin (porcine)  7,500 Units Subcutaneous Q8H    levothyroxine  75 mcg Oral Before breakfast    LIDOcaine  3 patch Transdermal Q24H    melatonin  6 mg Oral Nightly    metoprolol tartrate  50 mg Oral BID    polyethylene glycol  17 g Oral BID    potassium chloride  20 mEq Oral BID    senna-docusate 8.6-50 mg  1 tablet Oral Daily    tamsulosin  0.4 mg Oral Daily     PRN Meds:  Current Facility-Administered Medications:     albumin human 5%, 25 g, Intravenous, Once PRN    aspirin, 300 mg, Rectal, Once PRN    bisacodyL, 10 mg, Rectal, Daily PRN    dextrose 10%, 12.5 g, Intravenous, PRN    dextrose 10%, 12.5 g, Intravenous, PRN    dextrose 10%, 25 g, Intravenous, PRN    dextrose 10%, 25 g, Intravenous, PRN    dextrose, 16 g, Oral, PRN    dextrose, 16 g, Oral, PRN    dextrose,  24 g, Oral, PRN    glucagon (human recombinant), 1 mg, Intramuscular, PRN    glucose, 16 g, Oral, PRN    glucose, 24 g, Oral, PRN    insulin aspart U-100, 0-5 Units, Subcutaneous, QID (AC + HS) PRN    sodium chloride 0.9%, 10 mL, Intravenous, PRN     Objective:     Vital Signs (Most Recent):  Temp: 98.4 °F (36.9 °C) (12/19/24 0737)  Pulse: 82 (12/19/24 0803)  Resp: 20 (12/19/24 0803)  BP: 137/80 (12/19/24 0737)  SpO2: 96 % (12/19/24 0803) Vital Signs (24h Range):  Temp:  [97.3 °F (36.3 °C)-98.4 °F (36.9 °C)] 98.4 °F (36.9 °C)  Pulse:  [71-90] 82  Resp:  [18-20] 20  SpO2:  [94 %-98 %] 96 %  BP: ()/(60-81) 137/80     Weight: 76.7 kg (169 lb 1.5 oz)  Body mass index is 26.48 kg/m².    SpO2: 96 %       Intake/Output - Last 3 Shifts         12/17 0700  12/18 0659 12/18 0700 12/19 0659 12/19 0700  12/20 0659    P.O. 582 422 240    Total Intake(mL/kg) 582 (7.4) 422 (5.5) 240 (3.1)    Urine (mL/kg/hr) 775 (0.4)      Stool 0      Total Output 775      Net -193 +422 +240           Urine Occurrence 4 x 3 x     Stool Occurrence 1 x 1 x             Lines/Drains/Airways       Peripheral Intravenous Line  Duration                  Peripheral IV - Single Lumen 12/17/24 0400 22 G Right Forearm 2 days                     Physical Exam  Constitutional:       General: He is not in acute distress.  HENT:      Head: Normocephalic and atraumatic.   Cardiovascular:      Rate and Rhythm: Normal rate and regular rhythm.   Pulmonary:      Effort: Pulmonary effort is normal. No respiratory distress.   Musculoskeletal:         General: Normal range of motion.      Cervical back: Normal range of motion.   Skin:     Coloration: Skin is not pale.      Comments: Midline sternal incision c/d/i   Neurological:      General: No focal deficit present.      Mental Status: He is alert.   Psychiatric:         Mood and Affect: Mood normal.         Behavior: Behavior normal.            Significant Labs:  BMP:   Recent Labs   Lab 12/19/24  0109   GLU  "147*      K 3.9      CO2 25   BUN 27*   CREATININE 1.2   CALCIUM 8.7   MG 2.3     Cardiac markers: No results for input(s): "CKMB", "CPKMB", "TROPONINT", "TROPONINI", "MYOGLOBIN" in the last 48 hours.  CBC:   Recent Labs   Lab 12/19/24  0349   WBC 11.39   RBC 3.93*   HGB 12.7*   HCT 38.6*      MCV 98   MCH 32.3*   MCHC 32.9     CMP:   Recent Labs   Lab 12/19/24  0349   *   CALCIUM 8.7      K 3.9   CO2 25      BUN 27*   CREATININE 1.2     Coagulation: No results for input(s): "PT", "INR", "APTT" in the last 48 hours.  Lactic Acid: No results for input(s): "LACTATE" in the last 48 hours.  LFTs: No results for input(s): "ALT", "AST", "ALKPHOS", "BILITOT", "PROT", "ALBUMIN" in the last 48 hours.  Lipase: No results for input(s): "LIPASE" in the last 48 hours.    Significant Diagnostics:  I have reviewed all pertinent imaging results/findings within the past 24 hours.  Assessment/Plan:     * S/P MVR (mitral valve repair)  79 y.o. male with a PMHx of PE, DVT, HTN, severe MR, HLD, now s/p mitral valve repair on 12/13/24      - Maintain sternal precautions   - ASA  - metoprolol increase to 50mg   - Statin  - heparin sub q for DVT ppx  - Lasix  with scheduled potassium   - Multimodal pain management   - Encourage ambulation  - PT/OT  - Cardiac diet with 1500 cc fluid restriction   - Bowel regimen in place   - pepcid for ulcer prevention   - Monitor electrolytes to keep Mag above 2 and Potassium above 4  - OOBTC  - Encourage IS use  - flomax   - duoneb Q4 WA   - echo ordered     - DISPO -CSU to rehab, awaiting placement          Debility  Pending rehab   PT/OT    Hypokalemia  Scheduled replacement   Daily lab     Hypophosphatemia  Daily lab   Replace PRN     Thrombocytopenia  Post op expected   CBC daily to monitor trends     Acute blood loss anemia  - Expected postoperatively   - CBC daily to monitor trends         Prediabetes  Endocrine following     Rupture of chordae tendineae  See " s/p MVr     History of DVT (deep vein thrombosis)  SubQ heparin     Hypothyroidism  Continue home levothyroxine     Mitral regurgitation due to cusp prolapse  S/p mitral repair     HTN (hypertension)  Metoprolol increased to 50 BID     HLD (hyperlipidemia)  Statin       Dispo: CSU. Recs for rehab    Ashlyn Zhang PA-C  Cardiothoracic Surgery  [facility] - [facility]

## 2024-12-19 NOTE — PLAN OF CARE
Pt here POD 6 from a mitral valve repair. AAOX4,VSS,O2 sats>92% on RA. Complaints of shortness of breath w/ activity. PO amio and eliquis started for episode of Afib RVR. Plan of care discussed with patient, daughter Lisseth updated via phone. Pt awaiting placement at rehab facility. Patient has no complaints of pain. Discussed medications and care. Patient has no questions at this time. Pt visualised and stable. Call light within reach.Pt resting,bed at lowest position. Will continue to monitor through the shift.     Problem: Adult Inpatient Plan of Care  Goal: Plan of Care Review  Outcome: Progressing  Goal: Patient-Specific Goal (Individualized)  Outcome: Progressing  Goal: Absence of Hospital-Acquired Illness or Injury  Outcome: Progressing  Goal: Optimal Comfort and Wellbeing  Outcome: Progressing  Goal: Readiness for Transition of Care  Outcome: Progressing     Problem: Cardiovascular Surgery  Goal: Improved Activity Tolerance  Outcome: Progressing  Goal: Optimal Coping with Heart Surgery  Outcome: Progressing  Goal: Absence of Bleeding  Outcome: Progressing  Goal: Effective Bowel Elimination  Outcome: Progressing  Goal: Effective Cardiac Function  Outcome: Progressing  Goal: Optimal Cerebral Tissue Perfusion  Outcome: Progressing  Goal: Fluid and Electrolyte Balance  Outcome: Progressing  Goal: Blood Glucose Level Within Targeted Range  Outcome: Progressing  Goal: Absence of Infection Signs and Symptoms  Outcome: Progressing  Goal: Anesthesia/Sedation Recovery  Outcome: Progressing  Goal: Acceptable Pain Control  Outcome: Progressing  Goal: Nausea and Vomiting Relief  Outcome: Progressing  Goal: Effective Urinary Elimination  Outcome: Progressing  Goal: Effective Oxygenation and Ventilation  Outcome: Progressing     Problem: Wound  Goal: Optimal Coping  Outcome: Progressing  Goal: Optimal Functional Ability  Outcome: Progressing  Goal: Absence of Infection Signs and Symptoms  Outcome: Progressing  Goal:  Improved Oral Intake  Outcome: Progressing  Goal: Optimal Pain Control and Function  Outcome: Progressing  Goal: Skin Health and Integrity  Outcome: Progressing  Goal: Optimal Wound Healing  Outcome: Progressing     Problem: Infection  Goal: Absence of Infection Signs and Symptoms  Outcome: Progressing     Problem: Fall Injury Risk  Goal: Absence of Fall and Fall-Related Injury  Outcome: Progressing     Problem: Restraint, Nonviolent  Goal: Absence of Harm or Injury  Outcome: Progressing     Problem: Skin Injury Risk Increased  Goal: Skin Health and Integrity  Outcome: Progressing

## 2024-12-19 NOTE — SUBJECTIVE & OBJECTIVE
"Interval HPI:   No acute events overnight. Patient in room 330/330 A. Blood glucose improving. BG at and above goal on current insulin regimen (SSI ). Steroid use- None. 6 Days Post-Op  Renal function- Normal   Lab Results   Component Value Date    CREATININE 1.2 2024     Vasopressors-  None     Endocrine will continue to follow and manage insulin orders inpatient.     Diet Cardiac     Eatin%  Nausea: No  Hypoglycemia and intervention: No  Fever: No  TPN and/or TF: No  If yes, type of TF/TPN and rate: N/A    /80 (Patient Position: Lying)   Pulse 90   Temp 98.4 °F (36.9 °C) (Oral)   Resp 18   Ht 5' 7" (1.702 m)   Wt 76.7 kg (169 lb 1.5 oz)   SpO2 96%   BMI 26.48 kg/m²     Labs Reviewed and Include    Recent Labs   Lab 24   *   CALCIUM 8.7      K 3.9   CO2 25      BUN 27*   CREATININE 1.2     Lab Results   Component Value Date    WBC 11.39 2024    HGB 12.7 (L) 2024    HCT 38.6 (L) 2024    MCV 98 2024     2024     No results for input(s): "TSH", "FREET4" in the last 168 hours.  Lab Results   Component Value Date    HGBA1C 6.1 (H) 2024       Nutritional status:   Body mass index is 26.48 kg/m².  Lab Results   Component Value Date    ALBUMIN 2.7 (L) 2024    ALBUMIN 2.5 (L) 2024    ALBUMIN 3.0 (L) 12/15/2024     No results found for: "PREALBUMIN"    Estimated Creatinine Clearance: 45.9 mL/min (based on SCr of 1.2 mg/dL).    Accu-Checks  Recent Labs     24  1553 24  0018 24  0349 24  0822 24  1200 24  1938 24  1557 24  1958 24  0624   POCTGLUCOSE 131* 148* 170* 139* 127* 243* 247* 183* 207* 161*       Current Medications and/or Treatments Impacting Glycemic Control  Immunotherapy:    Immunosuppressants       None          Steroids:   Hormones (From admission, onward)      Start     Stop Route Frequency Ordered    12/15/24 2100  melatonin tablet " 6 mg         -- Oral Nightly 12/15/24 0632          Pressors:    Autonomic Drugs (From admission, onward)      None          Hyperglycemia/Diabetes Medications:   Antihyperglycemics (From admission, onward)      Start     Stop Route Frequency Ordered    12/18/24 0939  insulin aspart U-100 pen 0-5 Units         -- SubQ Before meals & nightly PRN 12/18/24 0839

## 2024-12-19 NOTE — PT/OT/SLP PROGRESS
"Occupational Therapy   Treatment    Name: Wiliam Arreola  MRN: [id number]  Admitting Diagnosis:  S/P MVR (mitral valve repair)  6 Days Post-Op    Recommendations:     Discharge Recommendations: High Intensity Therapy  Discharge Equipment Recommendations:  bath bench, platform, walker, rolling  Barriers to discharge:   (level of assistance needed)    Assessment:     Wiliam Arreola is a 79 y.o. male with a medical diagnosis of S/P MVR (mitral valve repair).  He presents alert and motivated. Pt fatigues easily and generally unsafe for independent ADL. Performance deficits affecting function are weakness, gait instability, decreased upper extremity function, impaired cardiopulmonary response to activity, impaired endurance, impaired balance, decreased safety awareness, impaired self care skills, impaired functional mobility, decreased coordination.     Rehab Prognosis:  Good; patient would benefit from acute skilled OT services to address these deficits and reach maximum level of function.       Plan:     Patient to be seen 5 x/week to address the above listed problems via self-care/home management, therapeutic activities, therapeutic exercises  Plan of Care Expires: 01/12/25  Plan of Care Reviewed with: patient    Subjective     Chief Complaint: "You didn't see that." (Pushing through UE)  Patient/Family Comments/goals: to get better  Pain/Comfort:  Pain Rating 1: 0/10    Objective:     Communicated with: RN prior to session.  Patient found supine with telemetry, bed alarm upon OT entry to room.    General Precautions: Standard, fall, sternal    Orthopedic Precautions:N/A  Braces: N/A  Respiratory Status: Room air     Occupational Performance:     Bed Mobility:    Patient completed Scooting/Bridging with contact guard  assistance  Patient completed Supine to Sit with contact guard assistance     Functional Mobility/Transfers:  Patient completed Sit <> Stand Transfer with contact guard assistance  with  hand-held " assist   Patient completed Toilet Transfer Step Transfer and STS technique with contact guard assistance with  no AD  Functional Mobility: Min A to/from bathroom with HHA    Activities of Daily Living:  Grooming: modified independence for tasks with CGA provided for static standing at sink  Lower Body Dressing: minimum assistance for donning pants and adjusting socks  Toileting: minimum assistance for standing pericare      AMPAC 6 Click ADL: 19    Treatment & Education:  Pt ed on OT POC  Sternal precautions reviewed  Pt stood at sink for ADL, requiring seated rest break  Pt ed on ROM ex's daily for increased overall strength and endurance to reduce risk of hospital acquired weakness  Pt ed on safety with ADL and fall risk  Reinforced use of call button to contact nursing staff for assistance with mobility    Patient left up in chair with all lines intact, call button in reach, and RN notified    GOALS:   Multidisciplinary Problems       Occupational Therapy Goals          Problem: Occupational Therapy    Goal Priority Disciplines Outcome Interventions   Occupational Therapy Goal     OT, PT/OT Progressing    Description: Goals to be met by: 12/28/2024     Patient will increase functional independence with ADLs by performing:    UE Dressing with Set-up Assistance.  LE Dressing with Stand-by Assistance.  Grooming while standing at sink with Stand-by Assistance.  Toileting from toilet with Stand-by Assistance for hygiene and clothing management.   Supine to sit with Stand-by Assistance.  Step transfer with Stand-by Assistance.  Toilet transfer to toilet with Stand-by Assistance.                         Time Tracking:     OT Date of Treatment: 12/19/24  OT Start Time: 1037  OT Stop Time: 1100  OT Total Time (min): 23 min    Billable Minutes:Self Care/Home Management 23 12/19/2024

## 2024-12-19 NOTE — PLAN OF CARE
STEVEN placed a call to [facility] and spoke to STEVEN Issa, [phone] who provided fax [phone] for additional clinical documents to be sent. STEVEN had PA sign requested [facility] worksheet and faxed it along with most recent clinicals. STEVEN awaiting [facility] auth for pt to go the [facility] in [place], [place]. STEVEN following as needed.    María Guillen, MSW  Case Management  [phone]

## 2024-12-19 NOTE — NURSING
Pt noted to be choking on lunch, pills this AM. Reports chronic swallowing issue. Notified CONNIE Zhang. SLP ordered. Daughter Lisseth updated.

## 2024-12-19 NOTE — NURSING
Pt HR on tele running 130-140's. Pulse palpated irregular. /81. EKG ordered STAT. While EKG setting up lines HR dropped back down in to 80's. CTS notified. No new orders given at this time.

## 2024-12-19 NOTE — ASSESSMENT & PLAN NOTE
79 y.o. male with a PMHx of PE, DVT, HTN, severe MR, HLD, now s/p mitral valve repair on 12/13/24      - Maintain sternal precautions   - ASA  - metoprolol increase to 50mg   - Statin  - heparin sub q for DVT ppx  - Lasix  with scheduled potassium   - Multimodal pain management   - Encourage ambulation  - PT/OT  - Cardiac diet with 1500 cc fluid restriction   - Bowel regimen in place   - pepcid for ulcer prevention   - Monitor electrolytes to keep Mag above 2 and Potassium above 4  - OOBTC  - Encourage IS use  - flomax   - duoneb Q4 WA   - echo ordered     - DISPO -CSU to rehab, awaiting placement

## 2024-12-19 NOTE — PLAN OF CARE
Pt educated on fall risk and remained free from falls/trauma/injury. Denies chest pain, SOB, palpitations, dizziness, pain, or discomfort. Plan of care reviewed with pt, all questions answered. Bed locked in lowest position, call bell within reach, no acute distress noted, will continue to monitor. VSS, afebrile.       Problem: Adult Inpatient Plan of Care  Goal: Plan of Care Review  Outcome: Progressing  Goal: Patient-Specific Goal (Individualized)  Outcome: Progressing  Goal: Absence of Hospital-Acquired Illness or Injury  Outcome: Progressing  Goal: Optimal Comfort and Wellbeing  Outcome: Progressing  Goal: Readiness for Transition of Care  Outcome: Progressing     Problem: Cardiovascular Surgery  Goal: Improved Activity Tolerance  Outcome: Progressing  Goal: Optimal Coping with Heart Surgery  Outcome: Progressing  Goal: Absence of Bleeding  Outcome: Progressing  Goal: Effective Bowel Elimination  Outcome: Progressing  Goal: Effective Cardiac Function  Outcome: Progressing  Goal: Optimal Cerebral Tissue Perfusion  Outcome: Progressing  Goal: Fluid and Electrolyte Balance  Outcome: Progressing  Goal: Blood Glucose Level Within Targeted Range  Outcome: Progressing  Goal: Absence of Infection Signs and Symptoms  Outcome: Progressing  Goal: Anesthesia/Sedation Recovery  Outcome: Progressing  Goal: Acceptable Pain Control  Outcome: Progressing  Goal: Nausea and Vomiting Relief  Outcome: Progressing  Goal: Effective Urinary Elimination  Outcome: Progressing  Goal: Effective Oxygenation and Ventilation  Outcome: Progressing

## 2024-12-20 LAB
ANION GAP SERPL CALC-SCNC: 9 MMOL/L (ref 8–16)
BASOPHILS # BLD AUTO: 0.09 K/UL (ref 0–0.2)
BASOPHILS NFR BLD: 0.9 % (ref 0–1.9)
BUN SERPL-MCNC: 25 MG/DL (ref 8–23)
CALCIUM SERPL-MCNC: 8.6 MG/DL (ref 8.7–10.5)
CHLORIDE SERPL-SCNC: 105 MMOL/L (ref 95–110)
CO2 SERPL-SCNC: 24 MMOL/L (ref 23–29)
CREAT SERPL-MCNC: 1.2 MG/DL (ref 0.5–1.4)
DIFFERENTIAL METHOD BLD: ABNORMAL
EOSINOPHIL # BLD AUTO: 0.5 K/UL (ref 0–0.5)
EOSINOPHIL NFR BLD: 4.7 % (ref 0–8)
ERYTHROCYTE [DISTWIDTH] IN BLOOD BY AUTOMATED COUNT: 12.4 % (ref 11.5–14.5)
EST. GFR  (NO RACE VARIABLE): >60 ML/MIN/1.73 M^2
GLUCOSE SERPL-MCNC: 153 MG/DL (ref 70–110)
HCT VFR BLD AUTO: 37.1 % (ref 40–54)
HGB BLD-MCNC: 12.1 G/DL (ref 14–18)
IMM GRANULOCYTES # BLD AUTO: 0.21 K/UL (ref 0–0.04)
IMM GRANULOCYTES NFR BLD AUTO: 2.1 % (ref 0–0.5)
LYMPHOCYTES # BLD AUTO: 1.3 K/UL (ref 1–4.8)
LYMPHOCYTES NFR BLD: 12.4 % (ref 18–48)
MAGNESIUM SERPL-MCNC: 2.4 MG/DL (ref 1.6–2.6)
MCH RBC QN AUTO: 32.5 PG (ref 27–31)
MCHC RBC AUTO-ENTMCNC: 32.6 G/DL (ref 32–36)
MCV RBC AUTO: 100 FL (ref 82–98)
MONOCYTES # BLD AUTO: 0.9 K/UL (ref 0.3–1)
MONOCYTES NFR BLD: 9.2 % (ref 4–15)
NEUTROPHILS # BLD AUTO: 7.2 K/UL (ref 1.8–7.7)
NEUTROPHILS NFR BLD: 70.7 % (ref 38–73)
NRBC BLD-RTO: 0 /100 WBC
PHOSPHATE SERPL-MCNC: 3.6 MG/DL (ref 2.7–4.5)
PLATELET # BLD AUTO: 229 K/UL (ref 150–450)
PMV BLD AUTO: 10.1 FL (ref 9.2–12.9)
POCT GLUCOSE: 120 MG/DL (ref 70–110)
POCT GLUCOSE: 123 MG/DL (ref 70–110)
POCT GLUCOSE: 137 MG/DL (ref 70–110)
POCT GLUCOSE: 155 MG/DL (ref 70–110)
POCT GLUCOSE: 186 MG/DL (ref 70–110)
POCT GLUCOSE: 197 MG/DL (ref 70–110)
POTASSIUM SERPL-SCNC: 4.1 MMOL/L (ref 3.5–5.1)
RBC # BLD AUTO: 3.72 M/UL (ref 4.6–6.2)
SODIUM SERPL-SCNC: 138 MMOL/L (ref 136–145)
WBC # BLD AUTO: 10.1 K/UL (ref 3.9–12.7)

## 2024-12-20 PROCEDURE — 25000003 PHARM REV CODE 250: Performed by: PHYSICIAN ASSISTANT

## 2024-12-20 PROCEDURE — 97535 SELF CARE MNGMENT TRAINING: CPT

## 2024-12-20 PROCEDURE — 25000003 PHARM REV CODE 250

## 2024-12-20 PROCEDURE — 20600001 HC STEP DOWN PRIVATE ROOM

## 2024-12-20 PROCEDURE — 97530 THERAPEUTIC ACTIVITIES: CPT

## 2024-12-20 PROCEDURE — 80048 BASIC METABOLIC PNL TOTAL CA: CPT

## 2024-12-20 PROCEDURE — 84100 ASSAY OF PHOSPHORUS: CPT

## 2024-12-20 PROCEDURE — 83735 ASSAY OF MAGNESIUM: CPT

## 2024-12-20 PROCEDURE — 97116 GAIT TRAINING THERAPY: CPT

## 2024-12-20 PROCEDURE — 25000003 PHARM REV CODE 250: Performed by: STUDENT IN AN ORGANIZED HEALTH CARE EDUCATION/TRAINING PROGRAM

## 2024-12-20 PROCEDURE — 85025 COMPLETE CBC W/AUTO DIFF WBC: CPT

## 2024-12-20 PROCEDURE — 92610 EVALUATE SWALLOWING FUNCTION: CPT

## 2024-12-20 PROCEDURE — 36415 COLL VENOUS BLD VENIPUNCTURE: CPT

## 2024-12-20 PROCEDURE — 99024 POSTOP FOLLOW-UP VISIT: CPT | Mod: ,,, | Performed by: PHYSICIAN ASSISTANT

## 2024-12-20 RX ADMIN — LEVOTHYROXINE SODIUM 75 MCG: 25 TABLET ORAL at 05:12

## 2024-12-20 RX ADMIN — POTASSIUM CHLORIDE 20 MEQ: 1500 TABLET, EXTENDED RELEASE ORAL at 08:12

## 2024-12-20 RX ADMIN — FUROSEMIDE 20 MG: 20 TABLET ORAL at 08:12

## 2024-12-20 RX ADMIN — METOPROLOL TARTRATE 50 MG: 50 TABLET, FILM COATED ORAL at 09:12

## 2024-12-20 RX ADMIN — APIXABAN 5 MG: 5 TABLET, FILM COATED ORAL at 09:12

## 2024-12-20 RX ADMIN — FAMOTIDINE 20 MG: 20 TABLET ORAL at 08:12

## 2024-12-20 RX ADMIN — DOCUSATE SODIUM 100 MG: 100 CAPSULE, LIQUID FILLED ORAL at 09:12

## 2024-12-20 RX ADMIN — ACETAMINOPHEN 650 MG: 325 TABLET ORAL at 02:12

## 2024-12-20 RX ADMIN — FUROSEMIDE 20 MG: 20 TABLET ORAL at 06:12

## 2024-12-20 RX ADMIN — AMIODARONE HYDROCHLORIDE 400 MG: 200 TABLET ORAL at 08:12

## 2024-12-20 RX ADMIN — ASPIRIN 81 MG: 81 TABLET, COATED ORAL at 08:12

## 2024-12-20 RX ADMIN — TAMSULOSIN HYDROCHLORIDE 0.4 MG: 0.4 CAPSULE ORAL at 08:12

## 2024-12-20 RX ADMIN — DOCUSATE SODIUM 100 MG: 100 CAPSULE, LIQUID FILLED ORAL at 08:12

## 2024-12-20 RX ADMIN — ACETAMINOPHEN 650 MG: 325 TABLET ORAL at 09:12

## 2024-12-20 RX ADMIN — POLYETHYLENE GLYCOL 3350 17 G: 17 POWDER, FOR SOLUTION ORAL at 09:12

## 2024-12-20 RX ADMIN — ACETAMINOPHEN 650 MG: 325 TABLET ORAL at 05:12

## 2024-12-20 RX ADMIN — ATORVASTATIN CALCIUM 40 MG: 40 TABLET, FILM COATED ORAL at 08:12

## 2024-12-20 RX ADMIN — POTASSIUM CHLORIDE 20 MEQ: 1500 TABLET, EXTENDED RELEASE ORAL at 09:12

## 2024-12-20 RX ADMIN — METOPROLOL TARTRATE 50 MG: 50 TABLET, FILM COATED ORAL at 08:12

## 2024-12-20 RX ADMIN — AMIODARONE HYDROCHLORIDE 400 MG: 200 TABLET ORAL at 09:12

## 2024-12-20 RX ADMIN — SENNOSIDES AND DOCUSATE SODIUM 1 TABLET: 50; 8.6 TABLET ORAL at 08:12

## 2024-12-20 RX ADMIN — MELATONIN TAB 3 MG 6 MG: 3 TAB at 09:12

## 2024-12-20 RX ADMIN — APIXABAN 5 MG: 5 TABLET, FILM COATED ORAL at 08:12

## 2024-12-20 NOTE — PT/OT/SLP PROGRESS
Occupational Therapy   Co-Treatment  Co-treatment performed due to patient's multiple deficits requiring two skilled therapists to appropriately and safely assess patient's strength and endurance while facilitating functional tasks in addition to accommodating for patient's activity tolerance.      Name: Wilima Arreola  MRN: [id number]  Admitting Diagnosis:  S/P MVR (mitral valve repair)  7 Days Post-Op    Recommendations:     Discharge Recommendations: High Intensity Therapy  Discharge Equipment Recommendations:  bath bench, platform, walker, rolling  Barriers to discharge:  None    Assessment:     Wiliam Arreola is a 79 y.o. male with a medical diagnosis of S/P MVR (mitral valve repair).  He presents with the following performance deficits affecting function are weakness, impaired endurance, impaired functional mobility, impaired self care skills, gait instability, impaired balance, decreased coordination, decreased upper extremity function, decreased safety awareness, pain, impaired cardiopulmonary response to activity. Pt able to recall 1/3 sternal precautions with vc's required throughout functional transfers. Pt would continue to benefit from skilled OT services to maximize functional independence with ADLs and functional mobility, reduce caregiver burden, and facilitate safe discharge in the least restrictive environment.     Rehab Prognosis:  Good; patient would benefit from acute skilled OT services to address these deficits and reach maximum level of function.       Plan:     Patient to be seen 5 x/week to address the above listed problems via self-care/home management, therapeutic activities, therapeutic exercises  Plan of Care Expires: 01/12/25  Plan of Care Reviewed with: patient    Subjective     Chief Complaint: pain  Patient/Family Comments/goals: to return to PLOF  Pain/Comfort:  Pain Rating 1: 5/10  Location - Orientation 1: generalized  Location 1: other (see comments) (sternum and back)  Pain  Addressed 1: Pre-medicate for activity, Reposition, Distraction  Pain Rating Post-Intervention 1: 5/10    Objective:   Additional staff present: TATIANA Navarro    Communicated with: RN prior to session.  Patient found HOB elevated with telemetry, Other (comments) (avasys) upon OT entry to room.    General Precautions: Standard, fall, sternal    Orthopedic Precautions:N/A  Braces: N/A  Respiratory Status: Room air     Occupational Performance:     Bed Mobility:    Patient completed Supine to Sit with minimum assistance     Functional Mobility/Transfers:  Patient completed Sit <> Stand Transfer with contact guard assistance  with  no assistive device   Functional Mobility: Pt engaged in functional mobility to simulate household/community distances ~300 ft with CGA and no AD in order to maximize functional endurance and standing balance required for engagement in occupations of choice   No overt LOB, unsteadiness noted  No SOB    Activities of Daily Living:  Lower Body Dressing: stand by assistance donning socks EOB using figure 4 technique EOB      [facility] 6 Click ADL: 18    Treatment & Education:  -Sternal precautions and application to functional tasks / ADLs reviewed:  including no lifting > 5 lbs, pulling or pushing with BUEs; Modifying daily activities and functional mobility tasks to maintain sternal precautions appropriately; Importance of participation in therapy and engaging in increased OOB mobility with assistance to improve endurance   -Pt educated to call for assistance and to transfer with hospital staff only  -Provided education regarding role of OT, POC, & discharge recommendations with pt verbalizing understanding.  Pt had no further questions & when asked whether there were any concerns pt reported none.     Patient left up in chair with all lines intact, call button in reach, and RN notified    GOALS:   Multidisciplinary Problems       Occupational Therapy Goals          Problem: Occupational Therapy     Goal Priority Disciplines Outcome Interventions   Occupational Therapy Goal     OT, PT/OT Progressing    Description: Goals to be met by: 12/28/2024     Patient will increase functional independence with ADLs by performing:    UE Dressing with Set-up Assistance.  LE Dressing with Stand-by Assistance.  Grooming while standing at sink with Stand-by Assistance.  Toileting from toilet with Stand-by Assistance for hygiene and clothing management.   Supine to sit with Stand-by Assistance.  Step transfer with Stand-by Assistance.  Toilet transfer to toilet with Stand-by Assistance.                         Time Tracking:     OT Date of Treatment: 12/20/24  OT Start Time: 1026  OT Stop Time: 1040  OT Total Time (min): 14 min    Billable Minutes:Therapeutic Activity 14    OT/THERON: OT          12/20/2024

## 2024-12-20 NOTE — PT/OT/SLP PROGRESS
Physical Therapy Treatment    Patient Name:  Wiliam Arreola   MRN:  [id number]  Admit Date: 12/13/2024  Admitting Diagnosis:  S/P MVR (mitral valve repair)   Length of Stay: 7 days  Recent Surgery: Procedure(s) (LRB):  REPAIR, MITRAL VALVE, OPEN (N/A) 7 Days Post-Op    Recommendations:     Discharge Recommendations:  High Intensity Therapy   Discharge Equipment Recommendations: bath bench, platform, walker, rolling   Barriers to discharge: decline from PLOF; decreased caregiver support at current level of function     Plan:     During this hospitalization, patient to be seen 5 x/week to address the listed problems via gait training, therapeutic activities, therapeutic exercises, neuromuscular re-education  Plan of Care Expires:  01/13/25  Plan of Care Reviewed with: patient    Assessment:     Wiliam Arreola is a 79 y.o. male admitted with a medical diagnosis of S/P MVR (mitral valve repair). Pt progressing towards goals, but not at PLOF. Pt tolerated session well. Pt is improving with therapy evidenced by improved gait stability and increased gait distance. Pt remains a fall risk due to gait instability. Will continue to progress mobility per patient's tolerance. Pt would benefit from continued acute PT to maximize functional mobility after surgical intervention. Patient presents with good participation and motivation to return to prior level of function with High Intensity Therapy.  The Patient demonstrates appropriate endurance and strength to participate in up to 3 hours or 15hrs of combined therapy post acute.         Problem List: weakness, impaired endurance, impaired functional mobility, gait instability, impaired balance, decreased upper extremity function, impaired cardiopulmonary response to activity.  Rehab Prognosis: Good     GOALS:   Multidisciplinary Problems       Physical Therapy Goals          Problem: Physical Therapy    Goal Priority Disciplines Outcome Interventions   Physical Therapy Goal     " PT, PT/OT Progressing    Description: Goals to be met by:      Patient will increase functional independence with mobility by performin. Supine to sit with [place]  2. Sit to supine with [place]  3. Sit to stand transfer with contact assistance  4. Gait  x 400 feet with Modified [place] using approved Assistive Device.   5. Ascend/descend 3 stair with bilateral Handrails Contact Guard Assistance                       Subjective   Communicated with RN prior to session.  Patient found HOB elevated upon PT entry to room, agreeable to evaluation. Wiliam Arreola's alone during session.    Chief Complaint: none reported   Patient/Family Comments/goals: to get better   Pain/Comfort:  Pain Rating 1: 5/10  Location 1:  (sternum and back)  Pain Addressed 1: Reposition, Distraction  Pain Rating Post-Intervention 1: 5/10    Objective:   Patient found with: telemetry   General Precautions: Standard, Cardiac fall, sternal   Orthopedic Precautions:N/A   Braces: N/A   Oxygen Device: Room Air  Vitals: /67 (BP Location: Right arm, Patient Position: Sitting)   Pulse 65   Temp 98.4 °F (36.9 °C) (Oral)   Resp 18   Ht 5' 7" (1.702 m)   Wt 76.7 kg (169 lb 1.5 oz)   SpO2 99%   BMI 26.48 kg/m²     Outcome Measures:  AM-PAC 6 CLICK MOBILITY  Turning over in bed (including adjusting bedclothes, sheets and blankets)?: 3  Sitting down on and standing up from a chair with arms (e.g., wheelchair, bedside commode, etc.): 3  Moving from lying on back to sitting on the side of the bed?: 3  Moving to and from a bed to a chair (including a wheelchair)?: 3  Need to walk in hospital room?: 3  Climbing 3-5 steps with a railing?: 2  Basic Mobility Total Score: 17     Functional Mobility:  Additional staff present: OT - due to pt requiring 2 skilled therapists to safely perform functional mobility and to accommodate pt's activity tolerance    Bed Mobility:   Supine to Sit: minimum assistance; " HOB elevated  Scooting anteriorly to EOB to have both feet planted on floor: stand by assistance    Sitting Balance at Edge of Bed:  Assistance Level Required: Stand-by Assistance    Transfers:   Sit <> Stand Transfer: contact guard assistance with no assistive device from EOB      Gait:  Patient ambulated: 300ft   Patient required: contact guard  Patient used: no assistive device  Gait Pattern observed: reciprocal gait  Gait Deviation(s): unsteady gait, decreased step length, narrow base of support, and decreased katharina  Impairments due to: impaired balance and decreased endurance  Comments:   Pt generally unsteady with decreased ability to correct LOBs, increasing fall risk  Mild SOB present  Verbal cuing for attention to task, pacing, and normal arm swing         Therapeutic Activities, Exercises, and Education:   Educated pt on PT role/POC  Educated pt on importance of OOB activity and daily ambulation   Educated pt on sternal precautions   Pt verbalized understanding     T/f to chair to increase tolerance to OOB activity and to create optimal positioning for lung expansion       Patient left up in chair with all lines intact, call button in reach, and RN notified..    Time Tracking:     PT Received On: 12/20/24  PT Start Time: 1027     PT Stop Time: 1040  PT Total Time (min): 13 min       Billable Minutes:   Gait Training 8    Treatment Type: Treatment  PT/PTA: PT

## 2024-12-20 NOTE — PROGRESS NOTES
[facility]  Cardiothoracic Surgery  Progress Note    Patient Name: Wiliam Arreola  MRN: [id number]  Admission Date: 12/13/2024  Hospital Length of Stay: 7 days  Code Status: Prior   Attending Physician: Babatunde Gates MD   Referring Provider: Babatunde Gates MD  Principal Problem:S/P MVR (mitral valve repair)      Subjective:     Post-Op Info:  Procedure(s) (LRB):  REPAIR, MITRAL VALVE, OPEN (N/A)   7 Days Post-Op     Interval History: NAEON. Patient ambulating well with therapy. Still waiting for [facility] approval [facility]. Ok for regular diet by speech. Went into afib yesterday afternoon. Was started on amio and Eliquis. NSR today.     Review of Systems   Constitutional: Negative for malaise/fatigue.   Cardiovascular:  Positive for dyspnea on exertion. Negative for chest pain.   Respiratory:  Negative for shortness of breath.    Hematologic/Lymphatic: Negative for bleeding problem.   Genitourinary:  Negative for dysuria.   Neurological:  Positive for weakness.     Medications:  Continuous Infusions:  Scheduled Meds:   acetaminophen  650 mg Oral Q8H    amiodarone  400 mg Oral BID    apixaban  5 mg Oral BID    aspirin  81 mg Oral Daily    atorvastatin  40 mg Oral Daily    docusate sodium  100 mg Oral BID    famotidine  20 mg Oral Daily    furosemide  20 mg Oral BID    levothyroxine  75 mcg Oral Before breakfast    LIDOcaine  3 patch Transdermal Q24H    melatonin  6 mg Oral Nightly    metoprolol tartrate  50 mg Oral BID    polyethylene glycol  17 g Oral BID    potassium chloride  20 mEq Oral BID    senna-docusate 8.6-50 mg  1 tablet Oral Daily    tamsulosin  0.4 mg Oral Daily     PRN Meds:  Current Facility-Administered Medications:     albumin human 5%, 25 g, Intravenous, Once PRN    aspirin, 300 mg, Rectal, Once PRN    bisacodyL, 10 mg, Rectal, Daily PRN    dextrose 10%, 12.5 g, Intravenous, PRN    dextrose 10%, 12.5 g, Intravenous, PRN    dextrose 10%, 25 g, Intravenous, PRN     dextrose 10%, 25 g, Intravenous, PRN    dextrose, 16 g, Oral, PRN    dextrose, 16 g, Oral, PRN    dextrose, 24 g, Oral, PRN    glucagon (human recombinant), 1 mg, Intramuscular, PRN    glucose, 16 g, Oral, PRN    glucose, 24 g, Oral, PRN    insulin aspart U-100, 0-5 Units, Subcutaneous, QID (AC + HS) PRN    sodium chloride 0.9%, 10 mL, Intravenous, PRN     Objective:     Vital Signs (Most Recent):  Temp: 98.4 °F (36.9 °C) (12/20/24 1152)  Pulse: 65 (12/20/24 1152)  Resp: 18 (12/20/24 1152)  BP: 111/67 (12/20/24 1152)  SpO2: 99 % (12/20/24 1152) Vital Signs (24h Range):  Temp:  [97.6 °F (36.4 °C)-98.4 °F (36.9 °C)] 98.4 °F (36.9 °C)  Pulse:  [] 65  Resp:  [18-20] 18  SpO2:  [96 %-100 %] 99 %  BP: (103-131)/(67-86) 111/67     Weight: 76.7 kg (169 lb 1.5 oz)  Body mass index is 26.48 kg/m².    SpO2: 99 %       Intake/Output - Last 3 Shifts         12/18 0700  12/19 0659 12/19 0700  12/20 0659 12/20 0700  12/21 0659    P.O. 422 800 360    Total Intake(mL/kg) 422 (5.5) 800 (10.4) 360 (4.7)    Urine (mL/kg/hr)  675 (0.4) 350 (0.8)    Stool       Total Output  675 350    Net +422 +125 +10           Urine Occurrence 3 x 1 x     Stool Occurrence 1 x              Lines/Drains/Airways       Peripheral Intravenous Line  Duration                  Peripheral IV - Single Lumen 12/17/24 0400 22 G Right Forearm 3 days                     Physical Exam  Constitutional:       General: He is not in acute distress.  HENT:      Head: Normocephalic and atraumatic.   Eyes:      Pupils: Pupils are equal, round, and reactive to light.   Cardiovascular:      Rate and Rhythm: Normal rate and regular rhythm.   Pulmonary:      Effort: Pulmonary effort is normal. No respiratory distress.   Musculoskeletal:         General: No swelling. Normal range of motion.      Cervical back: Normal range of motion.   Skin:     Coloration: Skin is not pale.      Comments: Midline sternal incision c/d/i   Neurological:      General: No focal deficit  "present.      Mental Status: He is alert.   Psychiatric:         Mood and Affect: Mood normal.         Behavior: Behavior normal.        Significant Labs:  BMP:   Recent Labs   Lab 12/20/24  0331   *      K 4.1      CO2 24   BUN 25*   CREATININE 1.2   CALCIUM 8.6*   MG 2.4     Cardiac markers: No results for input(s): "CKMB", "CPKMB", "TROPONINT", "TROPONINI", "MYOGLOBIN" in the last 48 hours.  CBC:   Recent Labs   Lab 12/20/24  0331   WBC 10.10   RBC 3.72*   HGB 12.1*   HCT 37.1*      *   MCH 32.5*   MCHC 32.6     CMP:   Recent Labs   Lab 12/20/24  0331   *   CALCIUM 8.6*      K 4.1   CO2 24      BUN 25*   CREATININE 1.2     Coagulation: No results for input(s): "PT", "INR", "APTT" in the last 48 hours.    Significant Diagnostics:  I have reviewed all pertinent imaging results/findings within the past 24 hours.   Assessment/Plan:     * S/P MVR (mitral valve repair)  79 y.o. male with a PMHx of PE, DVT, HTN, severe MR, HLD, now s/p mitral valve repair on 12/13/24      - Maintain sternal precautions   - ASA  - metoprolol increase to 50mg   - Statin  - heparin sub q for DVT ppx  - Lasix  with scheduled potassium   - Multimodal pain management   - Encourage ambulation  - PT/OT  - Cardiac diet with 1500 cc fluid restriction   - Bowel regimen in place   - pepcid for ulcer prevention   - Monitor electrolytes to keep Mag above 2 and Potassium above 4  - OOBTC  - Encourage IS use  - flomax   - duoneb Q4 WA   - echo ordered     - DISPO -[facility] to rehab, awaiting placement          Debility  Pending rehab   PT/OT    Hypokalemia  Scheduled replacement   Daily lab     Hypophosphatemia  Daily lab   Replace PRN     Thrombocytopenia  Post op expected   CBC daily to monitor trends     Acute blood loss anemia  - Expected postoperatively   - CBC daily to monitor trends         Prediabetes  Endocrine following     Rupture of chordae tendineae  See s/p MVr     History of DVT (deep vein " thrombosis)  SubQ heparin     Hypothyroidism  Continue home levothyroxine     Mitral regurgitation due to cusp prolapse  S/p mitral repair     HTN (hypertension)  Metoprolol increased to 50 BID     HLD (hyperlipidemia)  Statin       Dispo: [facility]. Pending rehab     Ashlyn Zhang PA-C  Cardiothoracic Surgery  [facility] - [facility] Step[facility]

## 2024-12-20 NOTE — CARE UPDATE
-Glucose Goal 110-140    -A1C:   Hemoglobin A1C   Date Value Ref Range Status   12/12/2024 6.1 (H) 4.0 - 5.6 % Final     Comment:     ADA Screening Guidelines:  5.7-6.4%  Consistent with prediabetes  >or=6.5%  Consistent with diabetes    High levels of fetal hemoglobin interfere with the HbA1C  assay. Heterozygous hemoglobin variants (HbS, HgC, etc)do  not significantly interfere with this assay.   However, presence of multiple variants may affect accuracy.           -HOME REGIMEN: Patient has pre-diabetes. No history of oral/injectable diabetic medications per chart review.      -GLUCOSE TREND FOR THE PAST 24HRS:   Recent Labs   Lab 12/18/24  1557 12/18/24  1958 12/19/24  0624 12/19/24  1142 12/19/24 2002 12/20/24  0607   POCTGLUCOSE 183* 207* 161* 136* 221* 155*         -NO HYPOGYCEMIAS NOTED     - Diet  Diet Cardiac    -TOLERATING 25 % OF PO DIET     -Steroids - N/A    -Tube Feeds - N/A        Plan:   Prediabetes  Endocrinology consulted for BG management.   BG goal 110-140 CTS     - Novolog (aspart) insulin prn for BG excursions LDC SSI (150/50).  - BG checks AC/HS  - Hypoglycemia protocol in place     ** Please notify Endocrine for any change and/or advance in diet**  ** Please call Endocrine for any BG related issues **     Discharge Planning:   Patient will not require oral/injectable diabetes medications upon discharge.      Danita Elise PA-C  Department of Endocrinology

## 2024-12-20 NOTE — PLAN OF CARE
[facility] - [facility] Step[facility]  Discharge Reassessment    Primary Care Provider: Silvio Aranda, NP-C    Expected Discharge Date: 12/23/2024        STEVEN placed a follow up call to [facility] manager Ms. Jean who provided [facility] contact Aubree Tigre. Per Ms. Landeros pt is followed by [facility] Gold Team 2 and his PCP is Jim Aranda [phone] and CM is Ning [phone]. [facility] fax is [phone]. STEVEN faxed clinical updates for review. [facility] [place] [facility]. Per Ning she will follow-up with the [facility] and let SW know the outcome.    Dispo: A. [facility] B. [facility]    Discharge Plan A and Plan B have been determined by review of patient's clinical status, future medical and therapeutic needs, and coverage/benefits for post-acute care in coordination with multidisciplinary team members.       María Guillen, MSW  Case Management  [id number]

## 2024-12-20 NOTE — PT/OT/SLP EVAL
Speech Language Pathology Evaluation  Bedside Swallow    Patient Name:  Wiliam Arreola   MRN:  [id number]  Admitting Diagnosis: S/P MVR (mitral valve repair)    Recommendations:                 General Recommendations:  Follow-up not indicated, consider follow up with GI   Diet recommendations:  Regular Diet - IDDSI Level 7, Thin liquids - IDDSI Level 0   Aspiration Precautions: Standard aspiration precautions   General Precautions: Standard,    Communication strategies:  none    Assessment:     Wiliam Arreola is a 79 y.o. male with an SLP diagnosis of a functional oropharyngeal swallow for a regular diet and thin liquids.  History:     History reviewed. No pertinent past medical history.    Past Surgical History:   Procedure Laterality Date    REPAIR, MITRAL VALVE, OPEN N/A 12/13/2024    Procedure: REPAIR, MITRAL VALVE, OPEN;  Surgeon: Babatunde Gates MD;  Location: [facility] OR [address] [place] [address];  Service: Cardiothoracic;  Laterality: N/A;     S/P MVR (mitral valve repair)  79 y.o. male with a PMHx of PE, DVT, HTN, severe MR, HLD, now s/p mitral valve repair on 12/13/24    Chest X-Rays: FINDINGS:  Postoperative changes as before.  Mild cardiomegaly.  No significant airspace consolidation or pleural effusion identified    Prior diet: regular and thins.    Subjective     Pt seen sleeping in bed.     Pain/Comfort:  Pain Rating 1: 0/10     Respiratory Status: Room air    Objective:     Oral Musculature Evaluation  Oral Musculature: WFL  Dentition: present and adequate  Secretion Management: adequate  Mucosal Quality: good  Mandibular Strength and Mobility: WFL  Oral Labial Strength and Mobility: WFL  Lingual Strength and Mobility: WFL    Bedside Swallow Eval:   Consistencies Assessed:  Thin liquids multiple cyclical straw sips  Solids pack of crackers      Oral Phase:   WFL    Pharyngeal Phase:   no overt clinical signs/symptoms of aspiration  no overt clinical signs/symptoms of pharyngeal dysphagia    Compensatory  Strategies  None    Treatment: Pt describes globus sensation at the base of his throat, he describes previous GI involvement. He describes coughing after meals, though no overt s/sx during PO intake. Discussed following up with GI provider to further evaluate sx described. Pt in agreement.     Goals:   Multidisciplinary Problems       SLP Goals       Not on file                    Plan:     Plan of Care reviewed with:    patient SLP Follow-Up:  No       Discharge recommendations:  No Therapy Indicated   Barriers to Discharge:  None    Time Tracking:     SLP Treatment Date:   12/20/24  Speech Start Time:  0917  Speech Stop Time:  0929     Speech Total Time (min):  12 min    Billable Minutes: Eval Swallow and Oral Function 12    12/20/2024

## 2024-12-20 NOTE — PLAN OF CARE
AAOX4,VSS,O2 sats>92% on RA. Pt maintaining SR in the 70s on tele. Midsternal incision CDI, dressing changed today. Pacer wires site CDI. Pt still with unsteady gait when OOB. Awaiting placement at [facility] in [place]. Plan of care discussed with patient. Patient has no complaints of pain/SOB. Discussed medications and care. Patient has no questions at this time. Pt visualised and stable. Call light within reach. Pt resting,bed at lowest position. Pt's family by the bedside. Will continue to monitor through the shift.     Problem: Adult Inpatient Plan of Care  Goal: Plan of Care Review  Outcome: Progressing  Goal: Patient-Specific Goal (Individualized)  Outcome: Progressing  Goal: Absence of Hospital-Acquired Illness or Injury  Outcome: Progressing  Goal: Optimal Comfort and Wellbeing  Outcome: Progressing  Goal: Readiness for Transition of Care  Outcome: Progressing     Problem: Cardiovascular Surgery  Goal: Improved Activity Tolerance  Outcome: Progressing  Goal: Optimal Coping with Heart Surgery  Outcome: Progressing  Goal: Absence of Bleeding  Outcome: Progressing  Goal: Effective Bowel Elimination  Outcome: Progressing  Goal: Effective Cardiac Function  Outcome: Progressing  Goal: Optimal Cerebral Tissue Perfusion  Outcome: Progressing  Goal: Fluid and Electrolyte Balance  Outcome: Progressing  Goal: Blood Glucose Level Within Targeted Range  Outcome: Progressing  Goal: Absence of Infection Signs and Symptoms  Outcome: Progressing  Goal: Anesthesia/Sedation Recovery  Outcome: Progressing  Goal: Acceptable Pain Control  Outcome: Progressing  Goal: Nausea and Vomiting Relief  Outcome: Progressing  Goal: Effective Urinary Elimination  Outcome: Progressing  Goal: Effective Oxygenation and Ventilation  Outcome: Progressing     Problem: Wound  Goal: Optimal Coping  Outcome: Progressing  Goal: Optimal Functional Ability  Outcome: Progressing  Goal: Absence of Infection Signs and Symptoms  Outcome:  Progressing  Goal: Improved Oral Intake  Outcome: Progressing  Goal: Optimal Pain Control and Function  Outcome: Progressing  Goal: Skin Health and Integrity  Outcome: Progressing  Goal: Optimal Wound Healing  Outcome: Progressing     Problem: Infection  Goal: Absence of Infection Signs and Symptoms  Outcome: Progressing     Problem: Fall Injury Risk  Goal: Absence of Fall and Fall-Related Injury  Outcome: Progressing     Problem: Restraint, Nonviolent  Goal: Absence of Harm or Injury  Outcome: Progressing     Problem: Skin Injury Risk Increased  Goal: Skin Health and Integrity  Outcome: Progressing

## 2024-12-21 VITALS
SYSTOLIC BLOOD PRESSURE: 123 MMHG | WEIGHT: 169.06 LBS | DIASTOLIC BLOOD PRESSURE: 63 MMHG | HEIGHT: 67 IN | RESPIRATION RATE: 18 BRPM | OXYGEN SATURATION: 96 % | HEART RATE: 80 BPM | TEMPERATURE: 98 F | BODY MASS INDEX: 26.53 KG/M2

## 2024-12-21 LAB
ANION GAP SERPL CALC-SCNC: 9 MMOL/L (ref 8–16)
BASOPHILS # BLD AUTO: 0.09 K/UL (ref 0–0.2)
BASOPHILS NFR BLD: 0.8 % (ref 0–1.9)
BUN SERPL-MCNC: 24 MG/DL (ref 8–23)
CALCIUM SERPL-MCNC: 9.3 MG/DL (ref 8.7–10.5)
CHLORIDE SERPL-SCNC: 102 MMOL/L (ref 95–110)
CO2 SERPL-SCNC: 25 MMOL/L (ref 23–29)
CREAT SERPL-MCNC: 1.3 MG/DL (ref 0.5–1.4)
DIFFERENTIAL METHOD BLD: ABNORMAL
EOSINOPHIL # BLD AUTO: 0.5 K/UL (ref 0–0.5)
EOSINOPHIL NFR BLD: 4.5 % (ref 0–8)
ERYTHROCYTE [DISTWIDTH] IN BLOOD BY AUTOMATED COUNT: 12.4 % (ref 11.5–14.5)
EST. GFR  (NO RACE VARIABLE): 55.5 ML/MIN/1.73 M^2
GLUCOSE SERPL-MCNC: 156 MG/DL (ref 70–110)
HCT VFR BLD AUTO: 41.5 % (ref 40–54)
HGB BLD-MCNC: 14.1 G/DL (ref 14–18)
IMM GRANULOCYTES # BLD AUTO: 0.31 K/UL (ref 0–0.04)
IMM GRANULOCYTES NFR BLD AUTO: 2.7 % (ref 0–0.5)
LYMPHOCYTES # BLD AUTO: 1.2 K/UL (ref 1–4.8)
LYMPHOCYTES NFR BLD: 10.6 % (ref 18–48)
MAGNESIUM SERPL-MCNC: 2.2 MG/DL (ref 1.6–2.6)
MCH RBC QN AUTO: 33.3 PG (ref 27–31)
MCHC RBC AUTO-ENTMCNC: 34 G/DL (ref 32–36)
MCV RBC AUTO: 98 FL (ref 82–98)
MONOCYTES # BLD AUTO: 0.8 K/UL (ref 0.3–1)
MONOCYTES NFR BLD: 7.1 % (ref 4–15)
NEUTROPHILS # BLD AUTO: 8.5 K/UL (ref 1.8–7.7)
NEUTROPHILS NFR BLD: 74.3 % (ref 38–73)
NRBC BLD-RTO: 0 /100 WBC
PHOSPHATE SERPL-MCNC: 3.5 MG/DL (ref 2.7–4.5)
PLATELET # BLD AUTO: 282 K/UL (ref 150–450)
PMV BLD AUTO: 9.7 FL (ref 9.2–12.9)
POCT GLUCOSE: 126 MG/DL (ref 70–110)
POCT GLUCOSE: 162 MG/DL (ref 70–110)
POCT GLUCOSE: 177 MG/DL (ref 70–110)
POTASSIUM SERPL-SCNC: 4.8 MMOL/L (ref 3.5–5.1)
RBC # BLD AUTO: 4.23 M/UL (ref 4.6–6.2)
SODIUM SERPL-SCNC: 136 MMOL/L (ref 136–145)
WBC # BLD AUTO: 11.44 K/UL (ref 3.9–12.7)

## 2024-12-21 PROCEDURE — 25000003 PHARM REV CODE 250

## 2024-12-21 PROCEDURE — 83735 ASSAY OF MAGNESIUM: CPT

## 2024-12-21 PROCEDURE — 85025 COMPLETE CBC W/AUTO DIFF WBC: CPT

## 2024-12-21 PROCEDURE — 84100 ASSAY OF PHOSPHORUS: CPT

## 2024-12-21 PROCEDURE — 99024 POSTOP FOLLOW-UP VISIT: CPT | Mod: ,,, | Performed by: PHYSICIAN ASSISTANT

## 2024-12-21 PROCEDURE — 36415 COLL VENOUS BLD VENIPUNCTURE: CPT

## 2024-12-21 PROCEDURE — 25000003 PHARM REV CODE 250: Performed by: PHYSICIAN ASSISTANT

## 2024-12-21 PROCEDURE — 25000003 PHARM REV CODE 250: Performed by: STUDENT IN AN ORGANIZED HEALTH CARE EDUCATION/TRAINING PROGRAM

## 2024-12-21 PROCEDURE — 80048 BASIC METABOLIC PNL TOTAL CA: CPT

## 2024-12-21 RX ORDER — DOCUSATE SODIUM 100 MG/1
100 CAPSULE, LIQUID FILLED ORAL 2 TIMES DAILY PRN
Qty: 14 CAPSULE | Refills: 0 | Status: CANCELLED | OUTPATIENT
Start: 2024-12-21

## 2024-12-21 RX ORDER — METOPROLOL TARTRATE 50 MG/1
50 TABLET ORAL 2 TIMES DAILY
Qty: 60 TABLET | Refills: 0 | Status: SHIPPED | OUTPATIENT
Start: 2024-12-21

## 2024-12-21 RX ORDER — AMIODARONE HYDROCHLORIDE 400 MG/1
400 TABLET ORAL DAILY
Qty: 30 TABLET | Refills: 0 | Status: SHIPPED | OUTPATIENT
Start: 2024-12-21

## 2024-12-21 RX ORDER — ASPIRIN 81 MG/1
81 TABLET ORAL DAILY
Qty: 30 TABLET | Refills: 11 | Status: CANCELLED | OUTPATIENT
Start: 2024-12-22 | End: 2025-12-22

## 2024-12-21 RX ORDER — LIDOCAINE 50 MG/G
2 PATCH TOPICAL DAILY
Qty: 14 PATCH | Refills: 0 | Status: CANCELLED | OUTPATIENT
Start: 2024-12-21

## 2024-12-21 RX ORDER — POTASSIUM CHLORIDE 20 MEQ/1
TABLET, EXTENDED RELEASE ORAL
Qty: 45 TABLET | Refills: 1 | Status: SHIPPED | OUTPATIENT
Start: 2024-12-21

## 2024-12-21 RX ORDER — FUROSEMIDE 20 MG/1
TABLET ORAL
Qty: 45 TABLET | Refills: 11 | Status: SHIPPED | OUTPATIENT
Start: 2024-12-21

## 2024-12-21 RX ORDER — ACETAMINOPHEN 500 MG
1000 TABLET ORAL EVERY 6 HOURS PRN
Qty: 30 TABLET | Refills: 0 | Status: SHIPPED | OUTPATIENT
Start: 2024-12-21

## 2024-12-21 RX ORDER — AMOXICILLIN 250 MG
1 CAPSULE ORAL DAILY PRN
Qty: 14 TABLET | Refills: 0 | Status: CANCELLED | OUTPATIENT
Start: 2024-12-21

## 2024-12-21 RX ADMIN — ASPIRIN 81 MG: 81 TABLET, COATED ORAL at 09:12

## 2024-12-21 RX ADMIN — METOPROLOL TARTRATE 50 MG: 50 TABLET, FILM COATED ORAL at 09:12

## 2024-12-21 RX ADMIN — FAMOTIDINE 20 MG: 20 TABLET ORAL at 09:12

## 2024-12-21 RX ADMIN — TAMSULOSIN HYDROCHLORIDE 0.4 MG: 0.4 CAPSULE ORAL at 09:12

## 2024-12-21 RX ADMIN — APIXABAN 5 MG: 5 TABLET, FILM COATED ORAL at 09:12

## 2024-12-21 RX ADMIN — POTASSIUM CHLORIDE 20 MEQ: 1500 TABLET, EXTENDED RELEASE ORAL at 09:12

## 2024-12-21 RX ADMIN — FUROSEMIDE 20 MG: 20 TABLET ORAL at 09:12

## 2024-12-21 RX ADMIN — INSULIN ASPART 1 UNITS: 100 INJECTION, SOLUTION INTRAVENOUS; SUBCUTANEOUS at 11:12

## 2024-12-21 RX ADMIN — ACETAMINOPHEN 650 MG: 325 TABLET ORAL at 05:12

## 2024-12-21 RX ADMIN — AMIODARONE HYDROCHLORIDE 400 MG: 200 TABLET ORAL at 09:12

## 2024-12-21 RX ADMIN — ATORVASTATIN CALCIUM 40 MG: 40 TABLET, FILM COATED ORAL at 09:12

## 2024-12-21 RX ADMIN — LEVOTHYROXINE SODIUM 75 MCG: 25 TABLET ORAL at 05:12

## 2024-12-21 NOTE — PLAN OF CARE
AAOX4,VSS,O2 sats>92% on RA. SR on tele. Able to ambulate w/ SBA. Plan of care discussed with patient. Patient has no complaints of pain/SOB. Discussed medications and care. Patient has no questions at this time. Pt visualised and stable. Call light within reach. Pt resting,bed at lowest position. Will continue to monitor through the shift.     Problem: Adult Inpatient Plan of Care  Goal: Plan of Care Review  Outcome: Met  Goal: Patient-Specific Goal (Individualized)  Outcome: Met  Goal: Absence of Hospital-Acquired Illness or Injury  Outcome: Met  Goal: Optimal Comfort and Wellbeing  Outcome: Met  Goal: Readiness for Transition of Care  Outcome: Met     Problem: Cardiovascular Surgery  Goal: Improved Activity Tolerance  Outcome: Met  Goal: Optimal Coping with Heart Surgery  Outcome: Met  Goal: Absence of Bleeding  Outcome: Met  Goal: Effective Bowel Elimination  Outcome: Met  Goal: Effective Cardiac Function  Outcome: Met  Goal: Optimal Cerebral Tissue Perfusion  Outcome: Met  Goal: Fluid and Electrolyte Balance  Outcome: Met  Goal: Blood Glucose Level Within Targeted Range  Outcome: Met  Goal: Absence of Infection Signs and Symptoms  Outcome: Met  Goal: Anesthesia/Sedation Recovery  Outcome: Met  Goal: Acceptable Pain Control  Outcome: Met  Goal: Nausea and Vomiting Relief  Outcome: Met  Goal: Effective Urinary Elimination  Outcome: Met  Goal: Effective Oxygenation and Ventilation  Outcome: Met     Problem: Wound  Goal: Optimal Coping  Outcome: Met  Goal: Optimal Functional Ability  Outcome: Met  Goal: Absence of Infection Signs and Symptoms  Outcome: Met  Goal: Improved Oral Intake  Outcome: Met  Goal: Optimal Pain Control and Function  Outcome: Met  Goal: Skin Health and Integrity  Outcome: Met  Goal: Optimal Wound Healing  Outcome: Met     Problem: Infection  Goal: Absence of Infection Signs and Symptoms  Outcome: Met     Problem: Fall Injury Risk  Goal: Absence of Fall and Fall-Related Injury  Outcome: Met      Problem: Restraint, Nonviolent  Goal: Absence of Harm or Injury  Outcome: Met     Problem: Skin Injury Risk Increased  Goal: Skin Health and Integrity  Outcome: Met

## 2024-12-21 NOTE — DISCHARGE SUMMARY
[facility] - Cardiology Stepdown  Cardiothoracic Surgery  Discharge Summary      Patient Name: Wiliam Arreola  MRN: [id number]  Admission Date: 12/13/2024  Hospital Length of Stay: 8 days  Discharge Date and Time:  12/21/2024 10:48 AM  Attending Physician: Babatunde Gates MD   Discharging Provider: Ashlyn Zhang PA-C  Primary Care Provider: Silvio Aranda NP-C    HPI:   Mr. Arreola is an 79-year-old gentleman who has known of his mitral regurgitation for roughly 1 year.  He is symptomatic, with dyspnea on exertion.  He has severe mitral regurgitation.  He now presents for mitral valve repair.     Procedure(s) (LRB):  REPAIR, MITRAL VALVE, OPEN (N/A)       Hospital Course: On 12/13/24, the patient was taken to the Operating Room for the above stated procedure. Please see the previously dictated operative report for complete details. Postoperatively, the patient was taken from the  Operating Room to the ICU where the vital signs were monitored and pain was kept under control. The patient was weaned from the drips and extubated in the ICU per protocol. Once hemodynamically stable, the patient was transferred to the Cardiac Step-Down floor for continued strengthening and ambulation. Patient had two quit episodes of atrial fibrillation with rapid ventricular response for which he was started on amiodarone and Eliquis. Therapy was recommending patient be discharged to rehab. Referrals were sent but at the time of discharge [facility] and the patient was ambulating 300 feet with only contact guard assistance. He reports he will have help from his girlfriend at home as well. Decision was made to send patient home with home health. On postoperative day 8, the patient was ready for discharge to home. At the time of discharge, the patient was ambulating unassisted. Pain was well controlled with oral analgesics and the patient was tolerating the diet.     MOBILITY AND  ACTIVITY: As tolerated. Patient may shower. No heavy lifting of greater than 5 pounds and no driving.     DIET: An 1800-calorie ADA with a 1500 mL fluid restriction.     WOUND CARE INSTRUCTIONS: Check for redness, swelling and drainage around the  incision or wound. Patient is to call for any obvious bleeding, drainage, pus from the wound, unusual problems or difficulties or temperature of greater than 101   degrees.     FOLLOWUP: Follow up with Dr. Gates in approximately 3 weeks. Prior to this  appointment, the patient will have a chest x-ray and EKG.     Patient not placed on Ace-Inhibitor at the time of discharge due to potential for hypotension     DISCHARGE CONDITION: At the time of discharge, the patient was in sinus rhythm and afebrile with stable vital signs.      Goals of Care Treatment Preferences:  Code Status: Full Code      Consults (From admission, onward)          Status Ordering Provider     Consult to Endocrinology  Once        Provider:  (Not yet assigned)    Completed SYLVESTER DAY     Consult Case Management/Social Work  Once        Provider:  (Not yet assigned)    Acknowledged SYLVESTER DAY            Cardiac/Vascular  HTN (hypertension)  Metoprolol 50 BID       Final Active Diagnoses:    Diagnosis Date Noted POA    PRINCIPAL PROBLEM:  S/P MVR (mitral valve repair) [Z98.890] 12/16/2024 Not Applicable    Hypokalemia [E87.6] 12/18/2024 No    Debility [R53.81] 12/18/2024 Yes    Acute blood loss anemia [D62] 12/16/2024 No    Thrombocytopenia [D69.6] 12/16/2024 No    Hypophosphatemia [E83.39] 12/16/2024 No    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] 12/13/2024 Yes    Prediabetes [R73.03] 12/13/2024 Yes    Mitral regurgitation due to cusp prolapse [I34.0, I34.1] 10/22/2024 Yes    HTN (hypertension) [I10] 10/22/2024 Yes    Hypothyroidism [E03.9] 10/22/2024 Yes    HLD (hyperlipidemia) [E78.5] 10/22/2024 Yes    History of DVT (deep vein thrombosis) [Z86.718] 10/22/2024 Not Applicable      Problems  Resolved During this Admission:      Discharged Condition: stable    Disposition: Home or Self Care    Follow Up:    Patient Instructions:      Ambulatory referral/consult to Home Health   Standing Status: Future   Referral Priority: Routine Referral Type: Home Health   Referral Reason: Specialty Services Required   Requested Specialty: Home Health Services   Number of Visits Requested: 1     Medications:  Reconciled Home Medications:      Medication List        START taking these medications      acetaminophen 500 MG tablet  Commonly known as: TYLENOL  Take 2 tablets (1,000 mg total) by mouth every 6 (six) hours as needed for Pain.     amiodarone 400 MG tablet  Commonly known as: PACERONE  Take 1 tablet (400 mg total) by mouth once daily.     apixaban 5 mg Tab  Commonly known as: ELIQUIS  Take 1 tablet (5 mg total) by mouth 2 (two) times daily.     furosemide 20 MG tablet  Commonly known as: LASIX  Take one tablet by mouth twice daily for 7 days then decrease to once daily     metoprolol tartrate 50 MG tablet  Commonly known as: LOPRESSOR  Take 1 tablet (50 mg total) by mouth 2 (two) times daily.     potassium chloride SA 20 MEQ tablet  Commonly known as: K-DUR,KLOR-CON  Take one tablet by mouth twice daily for 7 days then decrease to once daily            CONTINUE taking these medications      aspirin 81 MG EC tablet  Commonly known as: ECOTRIN  81 mg.     atorvastatin 40 MG tablet  Commonly known as: LIPITOR  Take 40 mg by mouth.     cetirizine 10 mg Cap  10 mg.     cholecalciferol (vitamin D3) 50 mcg (2,000 unit) Tab  Commonly known as: VITAMIN D3  Take 50 mcg by mouth.     cyanocobalamin 500 MCG tablet  Take 500 mcg by mouth.     famotidine 40 MG tablet  Commonly known as: PEPCID  Take 40 mg by mouth.     ferrous sulfate, dried 160 mg (50 mg iron) Tbsr  Commonly known as: SLOW FE  Take 160 mg by mouth once daily.     meclizine 25 MG tablet  Commonly known as: ANTIVERT  Take 25 mg by mouth.     melatonin 10 mg  Cap  Take 1 capsule as needed by oral route at bedtime.     pantoprazole 40 MG tablet  Commonly known as: PROTONIX  Take 40 mg by mouth.     SYNTHROID 75 MCG tablet  Generic drug: levothyroxine  Take 75 mcg by mouth.            STOP taking these medications      amLODIPine 2.5 MG tablet  Commonly known as: NORVASC     carvediloL 6.25 MG tablet  Commonly known as: COREG     losartan 50 MG tablet  Commonly known as: COZAAR     nitroGLYCERIN 0.4 MG SL tablet  Commonly known as: NITROSTAT     traMADoL 25 mg tablet  Commonly known as: Ultram            Time spent on the discharge of patient: 30 minutes    Ashlyn Zhang PA-C  Cardiothoracic Surgery  [facility] - Cardiology Stepdown

## 2024-12-21 NOTE — NURSING
Patient is ready for discharge. Patient stable alert and oriented. IVs removed. No complaints of pain. Discussed discharge plan. Reviewed medications and side effects, appointments, and answered questions with patient and family. 6 prescriptions picked up by pt at pharmacy. Pt discharged w/ nephew and all belongings.

## 2024-12-21 NOTE — CARE UPDATE
-Glucose Goal 110-140    -A1C:   Hemoglobin A1C   Date Value Ref Range Status   12/12/2024 6.1 (H) 4.0 - 5.6 % Final     Comment:     ADA Screening Guidelines:  5.7-6.4%  Consistent with prediabetes  >or=6.5%  Consistent with diabetes    High levels of fetal hemoglobin interfere with the HbA1C  assay. Heterozygous hemoglobin variants (HbS, HgC, etc)do  not significantly interfere with this assay.   However, presence of multiple variants may affect accuracy.           -HOME REGIMEN: Patient has pre-diabetes. No history of oral/injectable diabetic medications per chart review.      -GLUCOSE TREND FOR THE PAST 24HRS:   Recent Labs   Lab 12/19/24  1142 12/19/24 2002 12/20/24  0607 12/20/24  1620 12/20/24 2004 12/21/24  0647   POCTGLUCOSE 136* 221* 155* 126* 197* 177*         -NO HYPOGYCEMIAS NOTED     - Diet  Diet Cardiac    -TOLERATING 25 % OF PO DIET     -Steroids - N/A    -Tube Feeds - N/A        Plan:   Prediabetes  Endocrinology consulted for BG management.   BG goal 110-140 CTS     - Novolog (aspart) insulin prn for BG excursions LDC SSI (150/50).  - BG checks AC/HS  - Hypoglycemia protocol in place     ** Please notify Endocrine for any change and/or advance in diet**  ** Please call Endocrine for any BG related issues **     Discharge Planning:   Patient will not require oral/injectable diabetes medications upon discharge.      Danita Elise PA-C  Department of Endocrinology

## 2024-12-21 NOTE — SUBJECTIVE & OBJECTIVE
Interval History: NAEON. Still pending rehab placement. [facility] and [facility] to [facility] in [place].     Review of Systems   Constitutional: Negative for malaise/fatigue.   Cardiovascular:  Positive for dyspnea on exertion. Negative for chest pain.   Respiratory:  Negative for shortness of breath.    Hematologic/Lymphatic: Negative for bleeding problem.   Medications:  Continuous Infusions:  Scheduled Meds:   acetaminophen  650 mg Oral Q8H    amiodarone  400 mg Oral BID    apixaban  5 mg Oral BID    aspirin  81 mg Oral Daily    atorvastatin  40 mg Oral Daily    docusate sodium  100 mg Oral BID    famotidine  20 mg Oral Daily    furosemide  20 mg Oral BID    levothyroxine  75 mcg Oral Before breakfast    LIDOcaine  3 patch Transdermal Q24H    melatonin  6 mg Oral Nightly    metoprolol tartrate  50 mg Oral BID    polyethylene glycol  17 g Oral BID    potassium chloride  20 mEq Oral BID    senna-docusate 8.6-50 mg  1 tablet Oral Daily    tamsulosin  0.4 mg Oral Daily     PRN Meds:  Current Facility-Administered Medications:     albumin human 5%, 25 g, Intravenous, Once PRN    aspirin, 300 mg, Rectal, Once PRN    bisacodyL, 10 mg, Rectal, Daily PRN    dextrose 10%, 12.5 g, Intravenous, PRN    dextrose 10%, 12.5 g, Intravenous, PRN    dextrose 10%, 25 g, Intravenous, PRN    dextrose 10%, 25 g, Intravenous, PRN    dextrose, 16 g, Oral, PRN    dextrose, 16 g, Oral, PRN    dextrose, 24 g, Oral, PRN    glucagon (human recombinant), 1 mg, Intramuscular, PRN    glucose, 16 g, Oral, PRN    glucose, 24 g, Oral, PRN    insulin aspart U-100, 0-5 Units, Subcutaneous, QID (AC + HS) PRN    sodium chloride 0.9%, 10 mL, Intravenous, PRN     Objective:     Vital Signs (Most Recent):  Temp: 97.6 °F (36.4 °C) (12/21/24 0753)  Pulse: 76 (12/21/24 0753)  Resp: 18 (12/21/24 0753)  BP: (!) 112/56 (12/21/24 0753)  SpO2: 95 % (12/21/24 0753) Vital Signs (24h Range):  Temp:  [97.6 °F  "(36.4 °C)-98.4 °F (36.9 °C)] 97.6 °F (36.4 °C)  Pulse:  [65-78] 76  Resp:  [18] 18  SpO2:  [95 %-99 %] 95 %  BP: (111-144)/(56-80) 112/56     Weight: 76.7 kg (169 lb 1.5 oz)  Body mass index is 26.48 kg/m².    SpO2: 95 %       Intake/Output - Last 3 Shifts         12/19 0700 12/20 0659 12/20 0700 12/21 0659 12/21 0700  12/22 0659    P.O. 800 1122 120    Total Intake(mL/kg) 800 (10.4) 1122 (14.6) 120 (1.6)    Urine (mL/kg/hr) 675 (0.4) 1450 (0.8)     Total Output 675 1450     Net +125 -328 +120           Urine Occurrence 1 x              Lines/Drains/Airways       Peripheral Intravenous Line  Duration                  Peripheral IV - Single Lumen 12/17/24 0400 22 G Right Forearm 4 days                     Physical Exam  Constitutional:       General: He is not in acute distress.  HENT:      Head: Normocephalic and atraumatic.   Eyes:      Pupils: Pupils are equal, round, and reactive to light.   Cardiovascular:      Rate and Rhythm: Normal rate and regular rhythm.   Pulmonary:      Effort: Pulmonary effort is normal. No respiratory distress.   Musculoskeletal:         General: No swelling. Normal range of motion.      Cervical back: Normal range of motion.   Skin:     Coloration: Skin is not pale.      Comments: Midline sternal incision c/d/i   Neurological:      General: No focal deficit present.      Mental Status: He is alert.   Psychiatric:         Mood and Affect: Mood normal.         Behavior: Behavior normal.        Significant Labs:  BMP:   Recent Labs   Lab 12/21/24  0457   *      K 4.8      CO2 25   BUN 24*   CREATININE 1.3   CALCIUM 9.3   MG 2.2     Cardiac markers: No results for input(s): "CKMB", "CPKMB", "TROPONINT", "TROPONINI", "MYOGLOBIN" in the last 48 hours.  CBC:   Recent Labs   Lab 12/21/24  0457   WBC 11.44   RBC 4.23*   HGB 14.1   HCT 41.5      MCV 98   MCH 33.3*   MCHC 34.0     CMP:   Recent Labs   Lab 12/21/24  0457   *   CALCIUM 9.3      K 4.8   CO2 " 25      BUN 24*   CREATININE 1.3       Significant Diagnostics:  I have reviewed all pertinent imaging results/findings within the past 24 hours.

## 2024-12-21 NOTE — HOSPITAL COURSE
On 12/13/24, the patient was taken to the Operating Room for the above stated procedure. Please see the previously dictated operative report for complete details. Postoperatively, the patient was taken from the  Operating Room to the ICU where the vital signs were monitored and pain was kept under control. The patient was weaned from the drips and extubated in the ICU per protocol. Once hemodynamically stable, the patient was transferred to the Cardiac Step-Down floor for continued strengthening and ambulation. Patient had two quit episodes of atrial fibrillation with rapid ventricular response for which he was started on amiodarone and Eliquis. Therapy was recommending patient be discharged to rehab. Referrals were sent but at the time of discharge [facility] and the patient was ambulating 300 feet with only contact guard assistance. He reports he will have help from his girlfriend at home as well. Decision was made to send patient home with home health. On postoperative day 8, the patient was ready for discharge to home. At the time of discharge, the patient was ambulating unassisted. Pain was well controlled with oral analgesics and the patient was tolerating the diet.     MOBILITY AND ACTIVITY: As tolerated. Patient may shower. No heavy lifting of greater than 5 pounds and no driving.     DIET: An 1800-calorie ADA with a 1500 mL fluid restriction.     WOUND CARE INSTRUCTIONS: Check for redness, swelling and drainage around the  incision or wound. Patient is to call for any obvious bleeding, drainage, pus from the wound, unusual problems or difficulties or temperature of greater than 101   degrees.     FOLLOWUP: Follow up with Dr. Gates in approximately 3 weeks. Prior to this  appointment, the patient will have a chest x-ray and EKG.     Patient not placed on Ace-Inhibitor at the time of discharge due to potential for hypotension     DISCHARGE CONDITION: At the time of  discharge, the patient was in sinus rhythm and afebrile with stable vital signs.

## 2024-12-21 NOTE — HPI
Mr. Arreola is an 79-year-old gentleman who has known of his mitral regurgitation for roughly 1 year.  He is symptomatic, with dyspnea on exertion.  He has severe mitral regurgitation.  He now presents for mitral valve repair.

## 2024-12-23 ENCOUNTER — TELEPHONE (OUTPATIENT)
Dept: CARDIOTHORACIC SURGERY | Facility: CLINIC | Age: 79
End: 2024-12-23
Payer: OTHER GOVERNMENT

## 2024-12-23 ENCOUNTER — PATIENT MESSAGE (OUTPATIENT)
Dept: CARDIOTHORACIC SURGERY | Facility: CLINIC | Age: 79
End: 2024-12-23
Payer: OTHER GOVERNMENT

## 2024-12-23 NOTE — TELEPHONE ENCOUNTER
Spoke with pt following hospital discharge. Reviewed home care instructions and incision care instructions. Pt reports he would like to follow up with Dr. Damico instead of coming to [place] for a post op appointment with Dr. Gates. This is OK per Dr. Gates. Will facilitate follow up with Dr. Damico. Pt denies any further questions or concerns at this time.

## 2024-12-23 NOTE — PLAN OF CARE
[facility] - Cardiology Stepdown  Discharge Final Note    Primary Care Provider: Silvio Aranda, NP-C    Expected Discharge Date: 12/21/2024    Final Discharge Note (most recent)       Final Note - 12/23/24 1520          Final Note    Assessment Type Final Discharge Note (P)      Anticipated Discharge Disposition Home-Health Care Svc (P)      What phone number can be called within the next 1-3 days to see how you are doing after discharge? [phone] (P)         Post-Acute Status    Post-Acute Authorization Home Health (P)      Home Health Status Pending Payor Review (P)      Coverage [place] [facility] (P)                      Important Message from Medicare             Pt. discharged home with Home Health on 12/21/24. STEVEN spoke with representative Kathi [phone] with [facility] who is covering for Maegna Parr. Kathi states the [facility] team is working on pt's home health services since the Friday before pt. discharged.     Ezra Yoder LMSW

## 2024-12-23 NOTE — PLAN OF CARE
12/22/24 2014   Post-Acute Status   Post-Acute Authorization Home Health   Home Health Status Referrals Sent  ([facility])   Coverage ROXIE Yoder LMSW

## 2024-12-30 ENCOUNTER — TELEPHONE (OUTPATIENT)
Dept: CARDIOTHORACIC SURGERY | Facility: CLINIC | Age: 79
End: 2024-12-30
Payer: OTHER GOVERNMENT

## 2024-12-30 NOTE — TELEPHONE ENCOUNTER
Attempted call x2 to Dr. Damico's office to faciliate scheduling follow up for pt. Left VM with request for call back to my direct number.

## (undated) DEVICE — GLOVE BIOGEL PI MICRO SZ 7.5

## (undated) DEVICE — KIT SAHARA DRAPE DRAW/LIFT

## (undated) DEVICE — TRAY CATH 1-LYR URIMTR 16FR

## (undated) DEVICE — TIP YANKAUERS BULB NO VENT

## (undated) DEVICE — DRAPE INCISE IOBAN 2 23X17IN

## (undated) DEVICE — SYR 30CC LUER LOCK

## (undated) DEVICE — SUT PROLENE 5-0 24 C-1 BL

## (undated) DEVICE — SUT ETHIBOND XTRA 2-0 SH-2

## (undated) DEVICE — CONTAINER SPECIMEN OR STER 4OZ

## (undated) DEVICE — BLADE SAW STERNAL 5/BX

## (undated) DEVICE — PROBE CATH TEMP 16 FRFOLEY 400

## (undated) DEVICE — SUT 6 18IN STEEL MONO CCS

## (undated) DEVICE — SUT SILK 2-0 SH 18IN BLACK

## (undated) DEVICE — DRAPE CVMAX SPLIT ANES SCRN

## (undated) DEVICE — SUT PROLENE 4-0 SH BLU 36IN

## (undated) DEVICE — TRAY HEART OMC

## (undated) DEVICE — CANNULA MULTIPLE PERFUSIONSET

## (undated) DEVICE — DRESSING AQUACEL SACRAL 9 X 9

## (undated) DEVICE — SUT PROLENE 4-0 RB-1 BL MO

## (undated) DEVICE — ELECTRODE MULTI-FUNC ADULTSTAT

## (undated) DEVICE — DRAIN CHANNEL ROUND 19FR

## (undated) DEVICE — SUT 2/0 30IN ETHIBOND

## (undated) DEVICE — FOGERTY SOFT JAW DISP 2/PK

## (undated) DEVICE — SUT VICRYL PLUS 3-0 FS1 27

## (undated) DEVICE — Device

## (undated) DEVICE — ELECTRODE REM PLYHSV RETURN 9

## (undated) DEVICE — BOWL STERILE LARGE 32OZ

## (undated) DEVICE — SUT SILK BLK BR. 2 2-60

## (undated) DEVICE — BLANKET HYPER ADULT 24X60IN

## (undated) DEVICE — INSERTS STEALTH FIBRA SIZE 5

## (undated) DEVICE — CANNULA RETROGRADE CARDIOPLEG

## (undated) DEVICE — SOL 9P NACL IRR PIC IL

## (undated) DEVICE — KIT URINARY CATH URINE METER

## (undated) DEVICE — NDL HYPO STD REG BVL 22GX1.5IN

## (undated) DEVICE — SPONGE COTTON TRAY 4X4IN

## (undated) DEVICE — LOOP VESSEL BLUE MAXI

## (undated) DEVICE — DRESSING ADH ISLAND 3.6 X 14

## (undated) DEVICE — SUT PROLENE 3-0 SH DA 36 BL

## (undated) DEVICE — DRAPE SLUSH WARMER WITH DISC

## (undated) DEVICE — SUT VICRYL BR 1 GEN 27 CT-1

## (undated) DEVICE — SUT 2-0 VICRYL / CT-1

## (undated) DEVICE — DRAIN CHEST DRY SUCTION